# Patient Record
Sex: MALE | Race: WHITE | NOT HISPANIC OR LATINO | Employment: OTHER | URBAN - METROPOLITAN AREA
[De-identification: names, ages, dates, MRNs, and addresses within clinical notes are randomized per-mention and may not be internally consistent; named-entity substitution may affect disease eponyms.]

---

## 2017-01-06 ENCOUNTER — HOSPITAL ENCOUNTER (OUTPATIENT)
Dept: LAB | Facility: MEDICAL CENTER | Age: 62
End: 2017-01-06
Attending: FAMILY MEDICINE
Payer: COMMERCIAL

## 2017-01-06 ENCOUNTER — HOSPITAL ENCOUNTER (OUTPATIENT)
Dept: LAB | Facility: MEDICAL CENTER | Age: 62
End: 2017-01-06
Attending: INTERNAL MEDICINE
Payer: COMMERCIAL

## 2017-01-06 DIAGNOSIS — E78.5 HYPERLIPIDEMIA, UNSPECIFIED HYPERLIPIDEMIA TYPE: ICD-10-CM

## 2017-01-06 DIAGNOSIS — E03.9 HYPOTHYROIDISM, UNSPECIFIED TYPE: ICD-10-CM

## 2017-01-06 LAB
CHOLEST SERPL-MCNC: 162 MG/DL (ref 100–199)
EST. AVERAGE GLUCOSE BLD GHB EST-MCNC: 183 MG/DL
HBA1C MFR BLD: 8 % (ref 0–5.6)
HDLC SERPL-MCNC: 41 MG/DL
LDLC SERPL CALC-MCNC: 85 MG/DL
T4 FREE SERPL-MCNC: 1.01 NG/DL (ref 0.53–1.43)
TRIGL SERPL-MCNC: 180 MG/DL (ref 0–149)
TSH SERPL DL<=0.005 MIU/L-ACNC: 2.18 UIU/ML (ref 0.3–3.7)

## 2017-01-06 PROCEDURE — 84443 ASSAY THYROID STIM HORMONE: CPT

## 2017-01-06 PROCEDURE — 84439 ASSAY OF FREE THYROXINE: CPT

## 2017-01-06 PROCEDURE — 83036 HEMOGLOBIN GLYCOSYLATED A1C: CPT

## 2017-01-06 PROCEDURE — 80061 LIPID PANEL: CPT

## 2017-01-06 PROCEDURE — 36415 COLL VENOUS BLD VENIPUNCTURE: CPT

## 2017-01-24 ENCOUNTER — APPOINTMENT (OUTPATIENT)
Dept: MEDICAL GROUP | Facility: MEDICAL CENTER | Age: 62
End: 2017-01-24
Payer: COMMERCIAL

## 2017-02-16 DIAGNOSIS — Z87.19 HX OF DIVERTICULITIS OF COLON: ICD-10-CM

## 2017-02-16 DIAGNOSIS — E78.5 HYPERLIPIDEMIA, UNSPECIFIED HYPERLIPIDEMIA TYPE: ICD-10-CM

## 2017-02-16 RX ORDER — LEVOTHYROXINE SODIUM 200 MCG
200 TABLET ORAL DAILY
Qty: 90 TAB | Refills: 3 | Status: SHIPPED | OUTPATIENT
Start: 2017-02-16 | End: 2018-04-06 | Stop reason: SDUPTHER

## 2017-02-16 RX ORDER — LISINOPRIL 10 MG/1
10 TABLET ORAL
Qty: 90 TAB | Refills: 3 | Status: SHIPPED | OUTPATIENT
Start: 2017-02-16 | End: 2018-04-06 | Stop reason: SDUPTHER

## 2017-02-16 RX ORDER — DAPAGLIFLOZIN 5 MG/1
5 TABLET, FILM COATED ORAL DAILY
Qty: 90 TAB | Refills: 3 | Status: SHIPPED | OUTPATIENT
Start: 2017-02-16 | End: 2018-04-06 | Stop reason: SDUPTHER

## 2017-02-16 RX ORDER — SIMVASTATIN 40 MG
40 TABLET ORAL EVERY EVENING
Qty: 90 TAB | Refills: 3 | Status: SHIPPED | OUTPATIENT
Start: 2017-02-16 | End: 2018-04-06 | Stop reason: SDUPTHER

## 2017-02-16 NOTE — TELEPHONE ENCOUNTER
----- Message from Your Healthcare Team sent at 2/16/2017 11:49 AM PST -----  Regarding: RE: Prescription Question  Contact: [phone]  I will need rx's for Farxiga-10mg, Lisinopril-10mg, Metformin-1000mg, Synthroid-200mcg and Simvastatin-40mg. The Glimepiride-1mg and Victoza have already been done through Dr. Oliva.                                Thank You, Gianfranco Frank  ----- Message -----  From: Venecia Rosen, Med Ass't  Sent: 2/15/2017  2:38 PM PST  To: Kristian Frank  Subject: RE: Prescription Question    Stephen Townsend  Would be happy to send prescriptions to [facility].  What are you in need of at this time?    ----- Message -----     From: KRISTIAN FRANK     Sent: 2/15/2017 10:45 AM PST       To: Lisbet Kline M.D.  Subject: Prescription Question    I need to have new prescriptions issued for my new 90 day mail order pharmacy at [facility]. The city \[facility] [place] [facility]\"" changed over from [facility] on the 1st of the year. The Rx Bin: is [id number] and the Rx Group: is [facility], my member # is [id number]. The Victoza was changed to [facility] [address] [facility] in January through Dr. Oliva's office as well as a new rx for Glimepiride, 1mg that was filled by mail order through [facility]. Hope you're recovering well.

## 2017-05-05 ENCOUNTER — HOSPITAL ENCOUNTER (OUTPATIENT)
Dept: LAB | Facility: MEDICAL CENTER | Age: 62
End: 2017-05-05
Attending: INTERNAL MEDICINE
Payer: COMMERCIAL

## 2017-05-05 LAB
EST. AVERAGE GLUCOSE BLD GHB EST-MCNC: 148 MG/DL
HBA1C MFR BLD: 6.8 % (ref 0–5.6)

## 2017-05-05 PROCEDURE — 36415 COLL VENOUS BLD VENIPUNCTURE: CPT

## 2017-05-05 PROCEDURE — 83036 HEMOGLOBIN GLYCOSYLATED A1C: CPT

## 2017-11-02 ENCOUNTER — HOSPITAL ENCOUNTER (OUTPATIENT)
Dept: LAB | Facility: MEDICAL CENTER | Age: 62
End: 2017-11-02
Attending: INTERNAL MEDICINE
Payer: COMMERCIAL

## 2017-11-02 LAB
ALBUMIN SERPL BCP-MCNC: 4.3 G/DL (ref 3.2–4.9)
ALBUMIN/GLOB SERPL: 1.6 G/DL
ALP SERPL-CCNC: 46 U/L (ref 30–99)
ALT SERPL-CCNC: 31 U/L (ref 2–50)
ANION GAP SERPL CALC-SCNC: 12 MMOL/L (ref 0–11.9)
AST SERPL-CCNC: 21 U/L (ref 12–45)
BILIRUB SERPL-MCNC: 0.5 MG/DL (ref 0.1–1.5)
BUN SERPL-MCNC: 16 MG/DL (ref 8–22)
CALCIUM SERPL-MCNC: 9.5 MG/DL (ref 8.5–10.5)
CHLORIDE SERPL-SCNC: 103 MMOL/L (ref 96–112)
CHOLEST SERPL-MCNC: 138 MG/DL (ref 100–199)
CO2 SERPL-SCNC: 23 MMOL/L (ref 20–33)
CREAT SERPL-MCNC: 0.93 MG/DL (ref 0.5–1.4)
CREAT UR-MCNC: 117.4 MG/DL
EST. AVERAGE GLUCOSE BLD GHB EST-MCNC: 148 MG/DL
GFR SERPL CREATININE-BSD FRML MDRD: >60 ML/MIN/1.73 M 2
GLOBULIN SER CALC-MCNC: 2.7 G/DL (ref 1.9–3.5)
GLUCOSE SERPL-MCNC: 128 MG/DL (ref 65–99)
HBA1C MFR BLD: 6.8 % (ref 0–5.6)
HDLC SERPL-MCNC: 43 MG/DL
LDLC SERPL CALC-MCNC: 38 MG/DL
MICROALBUMIN UR-MCNC: <0.7 MG/DL
MICROALBUMIN/CREAT UR: NORMAL MG/G (ref 0–30)
POTASSIUM SERPL-SCNC: 4.4 MMOL/L (ref 3.6–5.5)
PROT SERPL-MCNC: 7 G/DL (ref 6–8.2)
SODIUM SERPL-SCNC: 138 MMOL/L (ref 135–145)
T4 FREE SERPL-MCNC: 0.82 NG/DL (ref 0.53–1.43)
TRIGL SERPL-MCNC: 287 MG/DL (ref 0–149)
TSH SERPL DL<=0.005 MIU/L-ACNC: 4.35 UIU/ML (ref 0.3–3.7)

## 2017-11-02 PROCEDURE — 80061 LIPID PANEL: CPT

## 2017-11-02 PROCEDURE — 82570 ASSAY OF URINE CREATININE: CPT

## 2017-11-02 PROCEDURE — 82043 UR ALBUMIN QUANTITATIVE: CPT

## 2017-11-02 PROCEDURE — 83036 HEMOGLOBIN GLYCOSYLATED A1C: CPT

## 2017-11-02 PROCEDURE — 84443 ASSAY THYROID STIM HORMONE: CPT

## 2017-11-02 PROCEDURE — 84439 ASSAY OF FREE THYROXINE: CPT

## 2017-11-02 PROCEDURE — 36415 COLL VENOUS BLD VENIPUNCTURE: CPT

## 2017-11-02 PROCEDURE — 80053 COMPREHEN METABOLIC PANEL: CPT

## 2017-11-04 ENCOUNTER — OFFICE VISIT (OUTPATIENT)
Dept: URGENT CARE | Facility: PHYSICIAN GROUP | Age: 62
End: 2017-11-04
Payer: COMMERCIAL

## 2017-11-04 VITALS
WEIGHT: 194 LBS | DIASTOLIC BLOOD PRESSURE: 82 MMHG | SYSTOLIC BLOOD PRESSURE: 110 MMHG | RESPIRATION RATE: 16 BRPM | HEIGHT: 66 IN | BODY MASS INDEX: 31.18 KG/M2 | TEMPERATURE: 98.4 F | OXYGEN SATURATION: 94 % | HEART RATE: 82 BPM

## 2017-11-04 DIAGNOSIS — H93.8X3 SENSATION OF FULLNESS IN EAR, BILATERAL: ICD-10-CM

## 2017-11-04 DIAGNOSIS — H61.21 IMPACTED CERUMEN OF RIGHT EAR: ICD-10-CM

## 2017-11-04 PROCEDURE — 99213 OFFICE O/P EST LOW 20 MIN: CPT | Performed by: PHYSICIAN ASSISTANT

## 2017-11-04 NOTE — PROGRESS NOTES
Chief Complaint   Patient presents with   • Ear Fullness     BL ear fullness x2 weeks       HISTORY OF PRESENT ILLNESS: Patient is a 62 y.o. male who presents today for 2 weeks of waxing and waning severity however overall worsening of bilateral ear fullness. Patient states he has had issues with wax build up in the ears in the past and suspects this may be going on. He was in hot tub last week and did got water in his ear and felt that may have aggravated the situation.  Felt warm this morning, no confirmed fevers but has been feeling well otherwise.  Does deal with seasonal allergies and takes medication for this, does not feel it is severe as it is in the spring.  No headaches.  No vertigo.  Decreased hearing on right.  No tinnitus.     Patient Active Problem List    Diagnosis Date Noted   • Allergic rhinitis 04/18/2016   • Post-nasal drip 04/18/2016   • Low back pain 04/18/2016   • Abnormal EKG 02/11/2016   • Tobacco abuse disorder 02/11/2016   • Obesity (BMI 30.0-34.9) 02/11/2016   • Epigastric abdominal pain 02/11/2016   • Sleep apnea 02/11/2016   • Tenosynovitis of wrist 02/11/2016   • Type 2 diabetes mellitus with hemoglobin A1c goal of less than 7.0% (CMS-[facility]) 10/10/2013   • Vitamin D deficiency disease 12/04/2012   • Hyperlipidemia    • Hypothyroid    • Diverticulitis        Allergies:Review of patient's allergies indicates no known allergies.    Current Outpatient Prescriptions Ordered in [facility]   Medication Sig Dispense Refill   • lisinopril (PRINIVIL) 10 MG Tab Take 1 Tab by mouth every day. 90 Tab 3   • metformin (GLUCOPHAGE) 1000 MG tablet Take 1 Tab by mouth 2 times a day, with meals. 180 Tab 3   • SYNTHROID 200 MCG Tab Take 1 Tab by mouth every day. 90 Tab 3   • simvastatin (ZOCOR) 40 MG Tab Take 1 Tab by mouth every evening. 90 Tab 3   • Dapagliflozin Propanediol 5 MG Tab Take 5 mg by mouth every day. 90 Tab 3   • VICTOZA 18 MG/3ML Solution Pen-injector injection INJECT 0.3 ML AS INSTRUCTED EVERY  "DAY. 3 mL 3   • Blood Glucose Monitoring Suppl SUPPLIES Misc Test strips for One Touch Ultra Blue meter. Sig: use BID and prn ssx high or low sugar #100 RF x 3 100 Strip 11   • B-D ULTRAFINE III SHORT PEN 31G X 8 MM Misc USE DAILY 100 Each 2     No current Epic-ordered facility-administered medications on file.        Past Medical History:   Diagnosis Date   • Back pain    • DIABETES MELLITUS    • Diverticulitis    • GOUT    • Hyperlipidemia    • Hypothyroid    • Pneumonia    • Post-nasal drip    • Rhinitis        Social History   Substance Use Topics   • Smoking status: Current Every Day Smoker     Packs/day: 0.50   • Smokeless tobacco: Never Used      Comment: started 1971  quit 1993   • Alcohol use 14.4 oz/week     24 Cans of beer per week      Comment: social       Family Status   Relation Status   • Mother Alive   • Father Alive   • Sister Alive   • Brother Alive   • Son Alive     Family History   Problem Relation Age of Onset   • Diabetes Mother    • Hypertension Mother    • Hyperlipidemia Mother    • Heart Disease Father    • Hypertension Father    • Hyperlipidemia Father        ROS:  Review of Systems   Constitutional: Negative for fever, chills, weight loss and malaise/fatigue.   HENT: SEE HPI  Eyes: Negative for blurred vision.   Respiratory: Negative for cough, sputum production, shortness of breath and wheezing.    Cardiovascular: Negative for chest pain, palpitations, orthopnea and leg swelling.   Gastrointestinal: Negative for heartburn, nausea, vomiting and abdominal pain.   All other systems reviewed and are negative.       Exam:  Blood pressure 110/82, pulse 82, temperature 36.9 °C (98.4 °F), resp. rate 16, height 1.676 m (5' 6\"), weight 88 kg (194 lb), SpO2 94 %.  General:  Well nourished, well developed male in NAD  Eyes: PERRLA, EOM within normal limits, no conjunctival injection, no scleral icterus, visual fields and acuity grossly intact.  Ears: Normal shape and symmetry, no tenderness, no " discharge. Right cerumen impaction.  S/p lavage: External canals are without any significant edema or erythema. Tympanic membranes are without any inflammation, no effusion. Gross auditory acuity is intact. No mastoid tenderness, no periauricular lymphadenopathy or tenderness  Nose: Symmetrical, sinuses without tenderness, clear rhinorrhea.   Mouth: reasonable hygiene, no erythema exudates or tonsillar enlargement.  Neck: no masses, range of motion within normal limits, no tracheal deviation. No lymphadenopathy  Pulmonary: Normal respiratory effort, no wheezes, crackles, or rhonchi.  Cardiovascular: regular rate and rhythm without murmurs, rubs, or gallops.  Skin: No visible rashes or lesion. Warm, pink, dry.   Extremities: no clubbing, cyanosis, or edema.  Neuro: A&O x 3. Speech normal/clear.  Normal gait.         Assessment/Plan:  1. Impacted cerumen of right ear     2. Sensation of fullness in ear, bilateral         -successful lavage with resolution of right ear fullness per patient  -otherwise benign ear exams, no evidence of infection  -suspect there may be some ETD in addition given bilateral waxing and waning fullness  -allergy care continued recommended  -PCP follow up and RTC precautions discussed     Supportive care, differential diagnoses, and indications for immediate follow-up discussed with patient.   Pathogenesis of diagnosis discussed including typical length and natural progression.   Instructed to return to clinic or nearest emergency department for any change in condition, further concerns, or worsening of symptoms.  Patient states understanding of the plan of care and discharge instructions.  Instructed to make an appointment, for follow up, with their primary care provider.      Crystal Liu P.A.-C.

## 2017-11-21 ENCOUNTER — OFFICE VISIT (OUTPATIENT)
Dept: MEDICAL GROUP | Facility: LAB | Age: 62
End: 2017-11-21
Payer: COMMERCIAL

## 2017-11-21 VITALS
RESPIRATION RATE: 16 BRPM | BODY MASS INDEX: 31.18 KG/M2 | WEIGHT: 194 LBS | SYSTOLIC BLOOD PRESSURE: 108 MMHG | DIASTOLIC BLOOD PRESSURE: 78 MMHG | HEIGHT: 66 IN | HEART RATE: 84 BPM | OXYGEN SATURATION: 95 % | TEMPERATURE: 97.4 F

## 2017-11-21 DIAGNOSIS — E03.9 HYPOTHYROIDISM, UNSPECIFIED TYPE: ICD-10-CM

## 2017-11-21 DIAGNOSIS — E11.9 TYPE 2 DIABETES MELLITUS WITH HEMOGLOBIN A1C GOAL OF LESS THAN 7.0% (HCC): ICD-10-CM

## 2017-11-21 DIAGNOSIS — E55.9 VITAMIN D DEFICIENCY DISEASE: ICD-10-CM

## 2017-11-21 DIAGNOSIS — Z72.0 TOBACCO ABUSE DISORDER: ICD-10-CM

## 2017-11-21 DIAGNOSIS — E66.9 OBESITY (BMI 30.0-34.9): ICD-10-CM

## 2017-11-21 DIAGNOSIS — E78.5 HYPERLIPIDEMIA, UNSPECIFIED HYPERLIPIDEMIA TYPE: ICD-10-CM

## 2017-11-21 PROCEDURE — 99214 OFFICE O/P EST MOD 30 MIN: CPT | Performed by: FAMILY MEDICINE

## 2017-11-21 ASSESSMENT — PATIENT HEALTH QUESTIONNAIRE - PHQ9: CLINICAL INTERPRETATION OF PHQ2 SCORE: 0

## 2017-11-21 NOTE — LETTER
[facility]  Lisbet Kline M.D.  [address]  [place] [address]  Fax: [phone]   Authorization for Release/Disclosure of   Protected Health Information   Name: SURESH ART : 1955 SSN: [id number]   Address: [address] [place] [address] [address] [place] [address] Phone:    [phone] (home)    I authorize the entity listed below to release/disclose the PHI below to:   [facility]/Lisbet Kline M.D. and Lisbet Kline M.D.   Provider or Entity Name:     Address   City, State, Zip   Phone:      Fax:     Reason for request: continuity of care   Information to be released:    [  ] LAST COLONOSCOPY,  including any PATH REPORT and follow-up  [  ] LAST FIT/COLOGUARD RESULT [  ] LAST DEXA  [  ] LAST MAMMOGRAM  [  ] LAST PAP  [  ] LAST LABS [  ] RETINA EXAM REPORT  [  ] IMMUNIZATION RECORDS  [  ] Release all info      [  ] Check here and initial the line next to each item to release ALL health information INCLUDING  _____ Care and treatment for drug and / or alcohol abuse  _____ HIV testing, infection status, or AIDS  _____ Genetic Testing    DATES OF SERVICE OR TIME PERIOD TO BE DISCLOSED: _____________  I understand and acknowledge that:  * This Authorization may be revoked at any time by you in writing, except if your health information has already been used or disclosed.  * Your health information that will be used or disclosed as a result of you signing this authorization could be re-disclosed by the recipient. If this occurs, your re-disclosed health information may no longer be protected by State or Federal laws.  * You may refuse to sign this Authorization. Your refusal will not affect your ability to obtain treatment.  * This Authorization becomes effective upon signing and will  on (date) __________.      If no date is indicated, this Authorization will  one (1) year from the signature date.    Name: Suresh Art    Signature:   Date:     2017       PLEASE FAX  REQUESTED RECORDS BACK TO: [phone]

## 2017-11-21 NOTE — PROGRESS NOTES
Chief Complaint   Patient presents with   • Follow-Up       HISTORY OF PRESENT ILLNESS: Patient is a 62 y.o. male established patient who presents today to follow up     Hyperlipidemia  This is a chronic problem. Patient is taking simvastatin 40 mg one by mouth daily. Recent lab show a total cholesterol of 138, triglyceride of 287, HDL 43 and LDL of 38.    Hypothyroid  This is a chronic problem. Patient is on Synthroid 200 µg daily. Most recent TSH 4.350 with a free T4 of 0.82. He was told to take 1/2 tab (100 mcg) 2 x a week and then a full tab all the rest of the days. He was having trouble splitting the pill so he just skipped the medication all together. Now he is just skipping medication only one day a week so he is now taking 200 mcg 6 days a week     Type 2 diabetes mellitus with hemoglobin A1c goal of less than 7.0% (CMS-[facility])  This is a chronic problem. He is followed by Dr. Oliva . Most recent A1c 6.8 which is stable from his A1c back in May. Urine microalbumin creatinine ratio is normal. He is on ACE inhibitor and he is on a statin. Doing okay with diabetic diet. Could propbably do with less carbs he reports. No hypoglycemia. No CP. No CP with exertion. Sees specialist 2 x a year     Vitamin D deficiency disease  This is a chronic problem. Taking vitamin d supplement     Tobacco abuse disorder  This is a chronic problem. Still at 1/2 ppd. Not ready to quit yet. Tried chantix in the past and he did ok but did not stay on the medication     States he has been doing pretty well overall. He had some trouble with the right knee over the summer. He slipped when camping.  His knee hurt for 3 months and now better for the past 3 weeks     Had flu vaccine this season     Patient Active Problem List    Diagnosis Date Noted   • Allergic rhinitis 04/18/2016   • Post-nasal drip 04/18/2016   • Low back pain 04/18/2016   • Abnormal EKG 02/11/2016   • Tobacco abuse disorder 02/11/2016   • Obesity (BMI 30.0-34.9)  "02/11/2016   • Epigastric abdominal pain 02/11/2016   • Sleep apnea 02/11/2016   • Tenosynovitis of wrist 02/11/2016   • Type 2 diabetes mellitus with hemoglobin A1c goal of less than 7.0% (CMS-[facility]) 10/10/2013   • Vitamin D deficiency disease 12/04/2012   • Hyperlipidemia    • Hypothyroid    • Diverticulitis         Allergies:Patient has no known allergies.    Current Outpatient Prescriptions   Medication Sig Dispense Refill   • lisinopril (PRINIVIL) 10 MG Tab Take 1 Tab by mouth every day. 90 Tab 3   • metformin (GLUCOPHAGE) 1000 MG tablet Take 1 Tab by mouth 2 times a day, with meals. 180 Tab 3   • SYNTHROID 200 MCG Tab Take 1 Tab by mouth every day. 90 Tab 3   • simvastatin (ZOCOR) 40 MG Tab Take 1 Tab by mouth every evening. 90 Tab 3   • Dapagliflozin Propanediol 5 MG Tab Take 5 mg by mouth every day. 90 Tab 3   • VICTOZA 18 MG/3ML Solution Pen-injector injection INJECT 0.3 ML AS INSTRUCTED EVERY DAY. 3 mL 3   • Blood Glucose Monitoring Suppl SUPPLIES Misc Test strips for One Touch Ultra Blue meter. Sig: use BID and prn ssx high or low sugar #100 RF x 3 100 Strip 11   • B-D ULTRAFINE III SHORT PEN 31G X 8 MM Misc USE DAILY 100 Each 2     No current facility-administered medications for this visit.        Social History   Substance Use Topics   • Smoking status: Current Every Day Smoker     Packs/day: 0.50   • Smokeless tobacco: Never Used      Comment: started 1971  quit 1993   • Alcohol use 14.4 oz/week     24 Cans of beer per week      Comment: social       Social History     Social History Narrative   • No narrative on file       Family History   Problem Relation Age of Onset   • Diabetes Mother    • Hypertension Mother    • Hyperlipidemia Mother    • Heart Disease Father    • Hypertension Father    • Hyperlipidemia Father        ROS:        Exam:    /78   Pulse 84   Temp 36.3 °C (97.4 °F)   Resp 16   Ht 1.676 m (5' 6\")   Wt 88 kg (194 lb)   SpO2 95%   BMI 31.31 kg/m²       General:  Well " nourished, well developed male in NAD    Physical Exam   Constitutional: Appears well-developed and well-nourished. Is active and cooperative.  Non-toxic appearance.   Head: Normocephalic and atraumatic.   Right Ear: External ear normal. Left Ear: External ear normal.   Eyes: Conjunctivae, EOM and lids are normal. No scleral icterus.   Cardiovascular: Normal rate, regular rhythm and normal heart sounds.    Pulmonary/Chest: Effort normal and breath sounds normal.   Neurological: Alert. No tremor. No display of seizure activity. Coordination and gait normal.   Skin: Skin is warm and dry. Patient is not diaphoretic.   Psychiatric: patient has a normal mood and affect; speech is normal and behavior is normal.  Monofilament normal bilaterally but sligtly less sensation on the right       Hospital Outpatient Visit on 11/02/2017   Component Date Value Ref Range Status   • Creatinine, Urine 11/02/2017 117.40  mg/dL Final   • Microalbumin, Urine Random 11/02/2017 <0.7  mg/dL Final   • Micro Alb Creat Ratio 11/02/2017 see below  0 - 30 mg/g Final    Comment: Unable to calculate the microalbumin/creatinine ratio due to  the microalbumin result or the urine creatinine result being  outside the measurement range of the analyzer.     • Free T-4 11/02/2017 0.82  0.53 - 1.43 ng/dL Final   • TSH 11/02/2017 4.350* 0.300 - 3.700 uIU/mL Final   • Sodium 11/02/2017 138  135 - 145 mmol/L Final   • Potassium 11/02/2017 4.4  3.6 - 5.5 mmol/L Final   • Chloride 11/02/2017 103  96 - 112 mmol/L Final   • Co2 11/02/2017 23  20 - 33 mmol/L Final   • Anion Gap 11/02/2017 12.0* 0.0 - 11.9 Final   • Calcium 11/02/2017 9.5  8.5 - 10.5 mg/dL Final   • Glucose 11/02/2017 128* 65 - 99 mg/dL Final   • Bun 11/02/2017 16  8 - 22 mg/dL Final   • Creatinine 11/02/2017 0.93  0.50 - 1.40 mg/dL Final   • AST(SGOT) 11/02/2017 21  12 - 45 U/L Final   • ALT(SGPT) 11/02/2017 31  2 - 50 U/L Final   • Alkaline Phosphatase 11/02/2017 46  30 - 99 U/L Final   • Total  Bilirubin 11/02/2017 0.5  0.1 - 1.5 mg/dL Final   • Albumin 11/02/2017 4.3  3.2 - 4.9 g/dL Final   • Total Protein 11/02/2017 7.0  6.0 - 8.2 g/dL Final   • Globulin 11/02/2017 2.7  1.9 - 3.5 g/dL Final   • A-G Ratio 11/02/2017 1.6  g/dL Final   • Cholesterol,Tot 11/02/2017 138  100 - 199 mg/dL Final   • Triglycerides 11/02/2017 287* 0 - 149 mg/dL Final   • HDL 11/02/2017 43  >=40 mg/dL Final   • LDL 11/02/2017 38  <100 mg/dL Final   • Glycohemoglobin 11/02/2017 6.8* 0.0 - 5.6 % Final    Comment: Increased risk for diabetes:  5.7 -6.4%  Diabetes:  >6.4%  Glycemic control for adults with diabetes:  <7.0%  The above interpretations are per ADA guidelines.  Diagnosis  of diabetes mellitus on the basis of elevated Hemoglobin A1c  should be confirmed by repeating the Hb A1c test.     • Est Avg Glucose 11/02/2017 148  mg/dL Final    Comment: The eAG calculation is based on the A1c-Derived Daily Glucose  (ADAG) study.  See the ADA's website for additional information.     • GFR If  11/02/2017 >60  >60 mL/min/1.73 m 2 Final   • GFR If Non  11/02/2017 >60  >60 mL/min/1.73 m 2 Final         Assessment/Plan:    1. Hypothyroidism, unspecified type  Discussed lab results with patient. States that he had not been taking his medication correctly. He is not taking Synthroid 200 µg 6 days a week. He is to check his labs in 2 months to make sure that he is on the correct dosage of the medication  - TSH; Future  - FREE THYROXINE; Future    2. Hyperlipidemia, unspecified hyperlipidemia type  LDL is at goal. His triglycerides are elevated. Recommend patient cut back on his carbohydrate intake.    3. Type 2 diabetes mellitus with hemoglobin A1c goal of less than 7.0% (CMS-[facility])  Followed by specialist. His A1c is at goal. Monofilament exam done today.  - Diabetic Monofilament Lower Extremity Exam    4. Vitamin D deficiency disease  Patient is to continue with vitamin D supplementation. Will check his vitamin  D level in a couple of months    5. Tobacco abuse disorder  Discussed cessation. Patient states that he's been on Chantix in the past and did okay with that however he quit taking the medication. Reminded patient that when he is ready to try to stop quitting he can try the Chantix again.    6. Obesity (BMI 30.0-34.9)  Discussed dietary changes and cutting back on carbohydrates.  - Patient identified as having weight management issue.  Appropriate orders and counseling given.      Please note that this dictation was created using voice recognition software. I have made every reasonable attempt to correct obvious errors, but I expect that there are errors of grammar and possibly content that I did not discover before finalizing the note.

## 2017-11-22 NOTE — TELEPHONE ENCOUNTER
Was the patient seen in the last year in this department? Yes Lov 11/22/2017    Does patient have an active prescription for medications requested? No     Received Request Via: Patient

## 2017-11-22 NOTE — TELEPHONE ENCOUNTER
----- Message from Kristian Frank sent at 11/22/2017 11:52 AM PST -----  Regarding: Prescription Question  Contact: [phone]  Good Morning, I need an rx refill for One Touch Ultra test strips. I forgot to ask at my appointment yesterday. I typically have my prescriptions filled through [facility] with the exception of Victoza which is obtained through the [facility] pharmacy on [address]. My old rx for test strips was through [facility] on [address]. I would like to obtain them through [facility] from now on if possible. I received a form from your office on my chart that looks like one that I signed yesterdday during my visit, there was no explanation with the form of what you'd like me to do with it.  Thanks, Gianfranco Frank

## 2018-01-29 ENCOUNTER — OFFICE VISIT (OUTPATIENT)
Dept: MEDICAL GROUP | Facility: LAB | Age: 63
End: 2018-01-29
Payer: COMMERCIAL

## 2018-01-29 ENCOUNTER — HOSPITAL ENCOUNTER (OUTPATIENT)
Dept: RADIOLOGY | Facility: MEDICAL CENTER | Age: 63
End: 2018-01-29
Attending: FAMILY MEDICINE
Payer: COMMERCIAL

## 2018-01-29 ENCOUNTER — APPOINTMENT (OUTPATIENT)
Dept: MEDICAL GROUP | Facility: LAB | Age: 63
End: 2018-01-29
Payer: COMMERCIAL

## 2018-01-29 VITALS
SYSTOLIC BLOOD PRESSURE: 112 MMHG | TEMPERATURE: 97.3 F | DIASTOLIC BLOOD PRESSURE: 80 MMHG | HEART RATE: 94 BPM | RESPIRATION RATE: 16 BRPM | OXYGEN SATURATION: 97 % | WEIGHT: 197 LBS | HEIGHT: 66 IN | BODY MASS INDEX: 31.66 KG/M2

## 2018-01-29 DIAGNOSIS — E11.9 TYPE 2 DIABETES MELLITUS WITH HEMOGLOBIN A1C GOAL OF LESS THAN 7.0% (HCC): ICD-10-CM

## 2018-01-29 DIAGNOSIS — M54.12 LEFT CERVICAL RADICULOPATHY: ICD-10-CM

## 2018-01-29 DIAGNOSIS — R29.898 LEFT HAND WEAKNESS: ICD-10-CM

## 2018-01-29 LAB
HBA1C MFR BLD: 6.5 % (ref ?–5.8)
INT CON NEG: NEGATIVE
INT CON POS: POSITIVE

## 2018-01-29 PROCEDURE — 83036 HEMOGLOBIN GLYCOSYLATED A1C: CPT | Performed by: FAMILY MEDICINE

## 2018-01-29 PROCEDURE — 99214 OFFICE O/P EST MOD 30 MIN: CPT | Performed by: FAMILY MEDICINE

## 2018-01-29 PROCEDURE — 72040 X-RAY EXAM NECK SPINE 2-3 VW: CPT

## 2018-01-29 NOTE — PROGRESS NOTES
Chief Complaint   Patient presents with   • Diabetes     follow up       HISTORY OF PRESENT ILLNESS: Patient is a 62 y.o. male established patient who presents today to follow up on his blood sugars     Type 2 diabetes mellitus with hemoglobin A1c goal of less than 7.0% (CMS-[facility])  This is a chronic problem. He is followed by Dr. Oliva. States he has left a message and has not heard back from his office. Patient is taking metformin 1000 mg twice daily, Victoza (GLP 1 agonist), farxiga (SGLT2 inhibitor).  Recently his BS have been going up over the past 6 weeks. He is taking medication as directed. This AM his FBS was better at 111. He states he has been feeling okay. FBS up from 130-140 to 155-175 over the past 6 weeks. A1c was 6.8 back on 11/2/2017    Reports there is 'something unusual going on' with his left hand. The '4th and 5th fingers are not engaging well'. No numbness, tingling in the fingers. The 5th finger will not move right sometimes. Started after he had headaches. This has been ongoing x 2 weeks. Is a little worse he reports. No trouble walking, taking no trouble with speech. Some pain in the left arm. No CP, no SOB, no cough. He is not dropping things. No facial droop. Is more constant now. No headache currently . Worse when he first wakes up.  Right handed. No neck pain     Patient Active Problem List    Diagnosis Date Noted   • Allergic rhinitis 04/18/2016   • Post-nasal drip 04/18/2016   • Low back pain 04/18/2016   • Abnormal EKG 02/11/2016   • Tobacco abuse disorder 02/11/2016   • Obesity (BMI 30.0-34.9) 02/11/2016   • Epigastric abdominal pain 02/11/2016   • Sleep apnea 02/11/2016   • Tenosynovitis of wrist 02/11/2016   • Type 2 diabetes mellitus with hemoglobin A1c goal of less than 7.0% (CMS-[facility]) 10/10/2013   • Vitamin D deficiency disease 12/04/2012   • Hyperlipidemia    • Hypothyroid    • Diverticulitis         Allergies:Patient has no known allergies.    Current Outpatient Prescriptions  "  Medication Sig Dispense Refill   • Blood Glucose Monitoring Suppl Supplies Misc Test strips for One Touch Ultra Blue meter. Sig: use BID and prn ssx high or low sugar #100 RF x 3 100 Strip 11   • lisinopril (PRINIVIL) 10 MG Tab Take 1 Tab by mouth every day. 90 Tab 3   • metformin (GLUCOPHAGE) 1000 MG tablet Take 1 Tab by mouth 2 times a day, with meals. 180 Tab 3   • SYNTHROID 200 MCG Tab Take 1 Tab by mouth every day. 90 Tab 3   • simvastatin (ZOCOR) 40 MG Tab Take 1 Tab by mouth every evening. 90 Tab 3   • Dapagliflozin Propanediol 5 MG Tab Take 5 mg by mouth every day. 90 Tab 3   • VICTOZA 18 MG/3ML Solution Pen-injector injection INJECT 0.3 ML AS INSTRUCTED EVERY DAY. 3 mL 3   • B-D ULTRAFINE III SHORT PEN 31G X 8 MM Misc USE DAILY 100 Each 2     No current facility-administered medications for this visit.        Social History   Substance Use Topics   • Smoking status: Current Every Day Smoker     Packs/day: 0.50   • Smokeless tobacco: Never Used      Comment: started 1971  quit 1993   • Alcohol use 14.4 oz/week     24 Cans of beer per week      Comment: social       Social History     Social History Narrative   • No narrative on file       Family History   Problem Relation Age of Onset   • Diabetes Mother    • Hypertension Mother    • Hyperlipidemia Mother    • Heart Disease Father    • Hypertension Father    • Hyperlipidemia Father        ROS:          Exam:    Blood pressure 112/80, pulse 94, temperature 36.3 °C (97.3 °F), resp. rate 16, height 1.676 m (5' 6\"), weight 89.4 kg (197 lb), SpO2 97 %.    General:  Well nourished, well developed male in NAD    Physical Exam   Constitutional: Appears well-developed and well-nourished. Is active and cooperative.  Non-toxic appearance.   Head: Normocephalic and atraumatic.   Right Ear: External ear normal. Left Ear: External ear normal.   Eyes: Conjunctivae, EOM and lids are normal. No scleral icterus.   Neurological: Alert, no acute distress.  No tremor. No " display of seizure activity. Coordination and gait normal.   Skin: Skin is warm and dry. Patient is not diaphoretic.   Psychiatric: patient has a normal mood and affect; speech is normal and behavior is normal.  Patient does have some weakness in his left fourth and fifth finger to extension against resistance. Patient has no tenderness at the lateral, medial epicondyle with active extension or flexion of wrist respectively    A1c 6.5       Assessment/Plan:    1. Type 2 diabetes mellitus with hemoglobin A1c goal of less than 7.0% (CMS-[facility])  Controlled. Patient states that his blood sugar this morning was lower. He is to continue to take his medications as directed and also check his blood sugars every morning fasting. No medication changes were made today.   - POCT  A1C    2. Left cervical radiculopathy  Check x-ray of the cervical spine.  - DX-CERVICAL SPINE-2 OR 3 VIEWS; Future    3. Left hand weakness  Check x-rays of the cervical spine.    Didn't check patient to go to the emergency room if he has any recurrence of the headache with weakness in any part of the body.    Please note that this dictation was created using voice recognition software. I have made every reasonable attempt to correct obvious errors, but I expect that there are errors of grammar and possibly content that I did not discover before finalizing the note.

## 2018-01-29 NOTE — ASSESSMENT & PLAN NOTE
Followed by Dr. Oliva. Taking metformin 1000 mg twice daily, Victoza (GLP 1 agonist), farxiga (SGLT2 inhibitor).    This is chronic

## 2018-02-01 ENCOUNTER — TELEPHONE (OUTPATIENT)
Dept: MEDICAL GROUP | Facility: LAB | Age: 63
End: 2018-02-01

## 2018-02-01 NOTE — TELEPHONE ENCOUNTER
----- Message from Lisbet Kline M.D. sent at 2/1/2018 10:35 AM PST -----  Neck xray shows arthritis and also vascular disease. Please have him follow up in about 6-8 weeks so we can see what his BS are doing as well

## 2018-02-01 NOTE — TELEPHONE ENCOUNTER
Sent my chart to help scheduled appt    [facility]ist Released Result Comments     Viewed by Kristian Frank on 2/1/2018 11:04 AM   Written by Lisbet Kline M.D. on 2/1/2018 10:35 AM   Neck xray shows arthritis and also vascular disease. Please have him follow up in about 6-8 weeks so we can see what his BS are doing as well

## 2018-03-27 ENCOUNTER — OFFICE VISIT (OUTPATIENT)
Dept: MEDICAL GROUP | Facility: LAB | Age: 63
End: 2018-03-27
Payer: COMMERCIAL

## 2018-03-27 VITALS
SYSTOLIC BLOOD PRESSURE: 108 MMHG | HEART RATE: 78 BPM | TEMPERATURE: 97.9 F | RESPIRATION RATE: 16 BRPM | DIASTOLIC BLOOD PRESSURE: 80 MMHG | OXYGEN SATURATION: 95 % | WEIGHT: 197 LBS | BODY MASS INDEX: 31.66 KG/M2 | HEIGHT: 66 IN

## 2018-03-27 DIAGNOSIS — E11.9 TYPE 2 DIABETES MELLITUS WITH HEMOGLOBIN A1C GOAL OF LESS THAN 7.0% (HCC): ICD-10-CM

## 2018-03-27 DIAGNOSIS — I65.29 CAROTID ATHEROSCLEROSIS, UNSPECIFIED LATERALITY: ICD-10-CM

## 2018-03-27 DIAGNOSIS — R29.898 LEFT HAND WEAKNESS: ICD-10-CM

## 2018-03-27 DIAGNOSIS — M54.12 LEFT CERVICAL RADICULOPATHY: ICD-10-CM

## 2018-03-27 DIAGNOSIS — E78.5 HYPERLIPIDEMIA, UNSPECIFIED HYPERLIPIDEMIA TYPE: ICD-10-CM

## 2018-03-27 PROCEDURE — 99214 OFFICE O/P EST MOD 30 MIN: CPT | Performed by: FAMILY MEDICINE

## 2018-03-27 NOTE — PROGRESS NOTES
Chief Complaint   Patient presents with   • Arthritis     follow up Neck Xray   • Orders Needed     PSA DUE       HISTORY OF PRESENT ILLNESS: Patient is a 62 y.o. male established patient who presents today for follow up     Left cervical radiculopathy  This is chronic. Patient recently had xray of the cervical spine and results as below.  He also has issues with lumbar 5-6. States that he has a smashed sciatic nerve. Has seen a neurosurgeon in the past for his lumbar spine.    Left hand weakness  This is chronic. He is still having problems with his left hand, the 4th and 5th fingers, the 5th finger is the worst. Was at first first thing in the morning and now his hand is a little slow compared to the right hand with movement.     Type 2 diabetes mellitus with hemoglobin A1c goal of less than 7.0% (CMS-[facility])  This is chronic. Referral placed to Dr Oliva however patient still hasn't heard back from his office. Patient is checking his fasting blood sugars in a running 135-140. A1c 6.5 in January. Patient is taking his metformin, lisinopril and simvastatin as directed. He is also taking Victoza and dapagliflozin daily     Hyperlipidemia   This is a chronic problem. He is currently taking simvastatin 40 mg daily    X-ray of the cervical spine also some calcification in the carotid arteries.    Patient Active Problem List    Diagnosis Date Noted   • Left cervical radiculopathy 01/29/2018   • Left hand weakness 01/29/2018   • Allergic rhinitis 04/18/2016   • Post-nasal drip 04/18/2016   • Low back pain 04/18/2016   • Abnormal EKG 02/11/2016   • Tobacco abuse disorder 02/11/2016   • Obesity (BMI 30.0-34.9) 02/11/2016   • Epigastric abdominal pain 02/11/2016   • Sleep apnea 02/11/2016   • Tenosynovitis of wrist 02/11/2016   • Type 2 diabetes mellitus with hemoglobin A1c goal of less than 7.0% (CMS-[facility]) 10/10/2013   • Vitamin D deficiency disease 12/04/2012   • Hyperlipidemia    • Hypothyroid    • Diverticulitis      "    Allergies:Patient has no known allergies.    Current Outpatient Prescriptions   Medication Sig Dispense Refill   • Blood Glucose Monitoring Suppl Supplies Misc Test strips for One Touch Ultra Blue meter. Sig: use BID and prn ssx high or low sugar #100 RF x 3 100 Strip 11   • lisinopril (PRINIVIL) 10 MG Tab Take 1 Tab by mouth every day. 90 Tab 3   • metformin (GLUCOPHAGE) 1000 MG tablet Take 1 Tab by mouth 2 times a day, with meals. 180 Tab 3   • SYNTHROID 200 MCG Tab Take 1 Tab by mouth every day. 90 Tab 3   • simvastatin (ZOCOR) 40 MG Tab Take 1 Tab by mouth every evening. 90 Tab 3   • Dapagliflozin Propanediol 5 MG Tab Take 5 mg by mouth every day. 90 Tab 3   • VICTOZA 18 MG/3ML Solution Pen-injector injection INJECT 0.3 ML AS INSTRUCTED EVERY DAY. 3 mL 3   • B-D ULTRAFINE III SHORT PEN 31G X 8 MM Misc USE DAILY 100 Each 2     No current facility-administered medications for this visit.        Social History   Substance Use Topics   • Smoking status: Current Every Day Smoker     Packs/day: 0.50   • Smokeless tobacco: Never Used      Comment: started 1971  quit 1993   • Alcohol use 14.4 oz/week     24 Cans of beer per week      Comment: social       Social History     Social History Narrative   • No narrative on file       Family History   Problem Relation Age of Onset   • Diabetes Mother    • Hypertension Mother    • Hyperlipidemia Mother    • Heart Disease Father    • Hypertension Father    • Hyperlipidemia Father        ROS:       Exam:    Blood pressure 108/80, pulse 78, temperature 36.6 °C (97.9 °F), resp. rate 16, height 1.676 m (5' 6\"), weight 89.4 kg (197 lb), SpO2 95 %.    General:  Well nourished, well developed male in NAD    Physical Exam   Constitutional:  Appears well-developed and well-nourished. Is active and cooperative.  Non-toxic appearance.   Head: Normocephalic and atraumatic.   Right Ear: External ear normal.   Left Ear: External ear normal.    Eyes: Conjunctivae, EOM and lids are normal. " No scleral icterus.   Neurological:  No tremor. No display of seizure activity. Coordination and gait normal.   Skin: Skin is warm and dry. Patient is not diaphoretic.   Psychiatric: patient has a normal mood and affect; speech is normal and behavior is normal.      1/29/2018 12:28 PM    HISTORY/REASON FOR EXAM:  Atraumatic Pain  Neck pain    TECHNIQUE/EXAM DESCRIPTION AND NUMBER OF VIEWS:  Cervical spine series, 3 views.    COMPARISON:  None.      FINDINGS:  The alignment of the cervical spine is normal through C7 on T1.    No displaced fracture is seen.    There is disc space narrowing at C5-6 and C6-7.    There is no prevertebral soft tissue swelling. Calcified plaque identified in the carotid arteries.   Impression       Disc space narrowing at C5-6 and C6-7.    Atherosclerotic disease.         Assessment/Plan:    1. Left cervical radiculopathy  Discussed with patient, uncontrolled. Referral to [facility] [place] for consultation  - REFERRAL TO NEUROSURGERY    2. Left hand weakness  Uncontrolled. Referral to [facility] [place]  - REFERRAL TO NEUROSURGERY    3. Type 2 diabetes mellitus with hemoglobin A1c goal of less than 7.0% (CMS-[facility])  Unclear why his referral to the specialist has not gone through. His A1c is 6.5 in January. He is to return to clinic in 3 months to have his A1c checked.    4. Carotid atherosclerosis, unspecified laterality  Discussed with patient. Continue statin. Check carotid Doppler  - US-CAROTID DOPPLER; Future    5. Hyperlipidemia, unspecified hyperlipidemia type  Controlled on medication. No change in management    Patient is to follow-up in 3 months, sooner when necessary. Will check his A1c at the next visit. Also will go over his referral to [facility] [place] at that time      Please note that this dictation was created using voice recognition software. I have made every reasonable attempt to correct obvious errors, but I expect that there are errors of grammar and possibly content that I did not  discover before finalizing the note.

## 2018-03-27 NOTE — LETTER
[facility]  Lisbet Kline M.D.  [address]  [place] [address]  Fax: [phone]   Authorization for Release/Disclosure of   Protected Health Information   Name: KRISTIAN ART : 1955 SSN: [id number]   Address: [address] [place] [address] Phone:    [phone] (home)    I authorize the entity listed below to release/disclose the PHI below to:   [facility]/Lisbet Kline M.D. and Lisbet Kline M.D.   Provider or Entity Name:  Tello Fuller O.D.   Address   City, [facility]   Phone:      Fax:Fax: [phone]     Reason for request: continuity of care   Information to be released:    [  ] LAST COLONOSCOPY,  including any PATH REPORT and follow-up  [  ] LAST FIT/COLOGUARD RESULT [  ] LAST DEXA  [  ] LAST MAMMOGRAM  [  ] LAST PAP  [  ] LAST LABS [  ] RETINA EXAM REPORT  [  ] IMMUNIZATION RECORDS  [  ] Release all info      [  ] Check here and initial the line next to each item to release ALL health information INCLUDING  _____ Care and treatment for drug and / or alcohol abuse  _____ HIV testing, infection status, or AIDS  _____ Genetic Testing    DATES OF SERVICE OR TIME PERIOD TO BE DISCLOSED: _____________  I understand and acknowledge that:  * This Authorization may be revoked at any time by you in writing, except if your health information has already been used or disclosed.  * Your health information that will be used or disclosed as a result of you signing this authorization could be re-disclosed by the recipient. If this occurs, your re-disclosed health information may no longer be protected by State or Federal laws.  * You may refuse to sign this Authorization. Your refusal will not affect your ability to obtain treatment.  * This Authorization becomes effective upon signing and will  on (date) __________.      If no date is indicated, this Authorization will  one (1) year from the signature date.    Name: Kristian ALDRIDGE  Zac    Signature:  CONTINU[facility] O CARE Date:     3/27/2018       PLEASE FAX REQUESTED RECORDS BACK TO: [phone]

## 2018-04-02 ENCOUNTER — HOSPITAL ENCOUNTER (OUTPATIENT)
Dept: RADIOLOGY | Facility: MEDICAL CENTER | Age: 63
End: 2018-04-02
Attending: FAMILY MEDICINE
Payer: COMMERCIAL

## 2018-04-02 ENCOUNTER — TELEPHONE (OUTPATIENT)
Dept: MEDICAL GROUP | Facility: LAB | Age: 63
End: 2018-04-02

## 2018-04-02 DIAGNOSIS — M54.12 LEFT CERVICAL RADICULOPATHY: ICD-10-CM

## 2018-04-02 DIAGNOSIS — R29.898 LEFT HAND WEAKNESS: ICD-10-CM

## 2018-04-02 DIAGNOSIS — I65.29 CAROTID ATHEROSCLEROSIS, UNSPECIFIED LATERALITY: ICD-10-CM

## 2018-04-02 PROCEDURE — 93880 EXTRACRANIAL BILAT STUDY: CPT

## 2018-04-02 NOTE — TELEPHONE ENCOUNTER
Riya calling from [facility] [phone]. She states Dr. Jimenez is requesting an MRI cervical spine on pt. He can put order in day of but would like to have this done prior to appt.   Please fax order to [phone]

## 2018-04-03 ENCOUNTER — HOSPITAL ENCOUNTER (OUTPATIENT)
Dept: RADIOLOGY | Facility: MEDICAL CENTER | Age: 63
End: 2018-04-03
Attending: FAMILY MEDICINE
Payer: COMMERCIAL

## 2018-04-03 DIAGNOSIS — R29.898 LEFT HAND WEAKNESS: ICD-10-CM

## 2018-04-03 DIAGNOSIS — M54.12 LEFT CERVICAL RADICULOPATHY: ICD-10-CM

## 2018-04-03 PROCEDURE — 72141 MRI NECK SPINE W/O DYE: CPT

## 2018-05-11 ENCOUNTER — HOSPITAL ENCOUNTER (OUTPATIENT)
Dept: LAB | Facility: MEDICAL CENTER | Age: 63
End: 2018-05-11
Attending: FAMILY MEDICINE
Payer: COMMERCIAL

## 2018-05-11 ENCOUNTER — HOSPITAL ENCOUNTER (OUTPATIENT)
Dept: LAB | Facility: MEDICAL CENTER | Age: 63
End: 2018-05-11
Attending: INTERNAL MEDICINE
Payer: COMMERCIAL

## 2018-05-11 DIAGNOSIS — E55.9 VITAMIN D DEFICIENCY DISEASE: ICD-10-CM

## 2018-05-11 DIAGNOSIS — E03.9 HYPOTHYROIDISM, UNSPECIFIED TYPE: ICD-10-CM

## 2018-05-11 LAB
25(OH)D3 SERPL-MCNC: 46 NG/ML (ref 30–100)
EST. AVERAGE GLUCOSE BLD GHB EST-MCNC: 148 MG/DL
HBA1C MFR BLD: 6.8 % (ref 0–5.6)
T4 FREE SERPL-MCNC: 1.14 NG/DL (ref 0.53–1.43)
TSH SERPL DL<=0.005 MIU/L-ACNC: 0.19 UIU/ML (ref 0.38–5.33)

## 2018-05-11 PROCEDURE — 82306 VITAMIN D 25 HYDROXY: CPT

## 2018-05-11 PROCEDURE — 36415 COLL VENOUS BLD VENIPUNCTURE: CPT

## 2018-05-11 PROCEDURE — 84439 ASSAY OF FREE THYROXINE: CPT

## 2018-05-11 PROCEDURE — 84443 ASSAY THYROID STIM HORMONE: CPT

## 2018-05-11 PROCEDURE — 83036 HEMOGLOBIN GLYCOSYLATED A1C: CPT

## 2018-06-22 ENCOUNTER — OFFICE VISIT (OUTPATIENT)
Dept: MEDICAL GROUP | Facility: MEDICAL CENTER | Age: 63
End: 2018-06-22
Payer: COMMERCIAL

## 2018-06-22 VITALS
HEART RATE: 83 BPM | HEIGHT: 66 IN | DIASTOLIC BLOOD PRESSURE: 76 MMHG | WEIGHT: 201.8 LBS | TEMPERATURE: 96.8 F | BODY MASS INDEX: 32.43 KG/M2 | RESPIRATION RATE: 16 BRPM | SYSTOLIC BLOOD PRESSURE: 118 MMHG | OXYGEN SATURATION: 95 %

## 2018-06-22 DIAGNOSIS — E78.5 HYPERLIPIDEMIA, UNSPECIFIED HYPERLIPIDEMIA TYPE: ICD-10-CM

## 2018-06-22 DIAGNOSIS — Z12.5 SCREENING FOR MALIGNANT NEOPLASM OF PROSTATE: ICD-10-CM

## 2018-06-22 DIAGNOSIS — E03.4 HYPOTHYROIDISM DUE TO ACQUIRED ATROPHY OF THYROID: ICD-10-CM

## 2018-06-22 DIAGNOSIS — E66.9 OBESITY (BMI 30-39.9): ICD-10-CM

## 2018-06-22 DIAGNOSIS — E11.9 TYPE 2 DIABETES MELLITUS WITH HEMOGLOBIN A1C GOAL OF LESS THAN 7.0% (HCC): ICD-10-CM

## 2018-06-22 DIAGNOSIS — Z76.89 ENCOUNTER TO ESTABLISH CARE: ICD-10-CM

## 2018-06-22 DIAGNOSIS — F17.210 CIGARETTE NICOTINE DEPENDENCE WITHOUT COMPLICATION: ICD-10-CM

## 2018-06-22 DIAGNOSIS — F10.20 UNCOMPLICATED ALCOHOL DEPENDENCE (HCC): ICD-10-CM

## 2018-06-22 PROCEDURE — 99213 OFFICE O/P EST LOW 20 MIN: CPT | Performed by: NURSE PRACTITIONER

## 2018-06-22 NOTE — PROGRESS NOTES
CC: Establish care and request for prostate labs      Kristian Frank is a 63 y.o. male here to establish care and to discuss the evaluation and management of:    1. Screening for malignant neoplasm of prostate  Patient states that he is requesting a PSA test as he is due for one and has never had one. Denies any hematuria, frequency, urgency, pelvic pain or dysuria.    2. Type 2 diabetes mellitus with hemoglobin A1c goal of less than 7.0% ([facility])  Patient states that his diabetes for approximately 20 years. States he is followed up by his endocrinologist and sees him every 6 months. Next appointment in November. Most recent A1c was 6.8% on May 18 of this year. States he takes his blood sugars every other day and his fasting blood sugar most recently was 138. States he does get an eye examination was told that he does not have any diabetic retinopathy. Denies any hypoglycemic events. It currently not taking any aspirin. Denies any nonhealing wounds or sores on his feet. States he does have some neuropathy in his left foot that comes and goes.    3. Hyperlipidemia, unspecified hyperlipidemia type  Patient states that he is currently taking simvastatin for his elevated cholesterol. Denies any myalgias.    4. Hypothyroidism due to acquired atrophy of thyroid  Patient states that he does have hypothyroidism. States that his most recent TSH he was overcorrected at 0.190 he was told to take 200 µg of his thyroid medication 6 days a week and then skip one day. States he's been doing so.    5. Cigarette nicotine dependence without complication  Patient currently smokes about half pack a day. Has quit in the past however difficult to remain tobacco free.    6. Uncomplicated alcohol dependence (HCC)  Patient states he does have approximately about 24 beers a week. Patient is a home lopez.    7. Obesity (BMI 30-39.9)  Patient does not exercise.    8. Encounter to establish care  Former patient of Dr. Kline      ROS:   Denies any Headache, Blurred Vision, Confusion Chest pain,  Shortness of breath,  Abdominal pain, Changes of bowel or bladder, Lower ext edema, Fevers, Nights sweats, Weight Changes, Focal weakness or numbness.  All other systems are negative. Reports occasional neuropathy in his feet      Current Outpatient Prescriptions:   •  vitamin D (CHOLECALCIFEROL) 1000 UNIT Tab, Take 3 Tabs by mouth every day., Disp: 60 Tab, Rfl:   •  SYNTHROID 200 MCG Tab, TAKE 1 TABLET BY MOUTH EVERY DAY, Disp: 90 Tab, Rfl: 1  •  simvastatin (ZOCOR) 40 MG Tab, TAKE 1 TABLET BY MOUTH EVERY EVENING., Disp: 90 Tab, Rfl: 2  •  metformin (GLUCOPHAGE) 1000 MG tablet, TAKE 1 TABLET BY MOUTH 2 TIMES DAILY WITH MEALS, Disp: 180 Tab, Rfl: 2  •  lisinopril (PRINIVIL) 10 MG Tab, TAKE 1 TABLET BY MOUTH EVERY DAY, Disp: 90 Tab, Rfl: 2  •  FARXIGA 5 MG Tab, TAKE 1 TABLET DAILY, Disp: 90 Tab, Rfl: 2  •  Blood Glucose Monitoring Suppl Supplies Misc, Test strips for One Touch Ultra Blue meter. Sig: use BID and prn ssx high or low sugar #100 RF x 3, Disp: 100 Strip, Rfl: 11  •  VICTOZA 18 MG/3ML Solution Pen-injector injection, INJECT 0.3 ML AS INSTRUCTED EVERY DAY., Disp: 3 mL, Rfl: 3  •  B-D ULTRAFINE III SHORT PEN 31G X 8 MM Misc, USE DAILY, Disp: 100 Each, Rfl: 2    No Known Allergies    Past Medical History:   Diagnosis Date   • Back pain     ddd   • DIABETES MELLITUS    • Diverticulitis    • GOUT    • Hyperlipidemia    • Hypothyroid    • Pneumonia    • Post-nasal drip    • Rhinitis      Past Surgical History:   Procedure Laterality Date   • APPENDECTOMY     • COLON RESECTION       Family History   Problem Relation Age of Onset   • Diabetes Mother    • Hypertension Mother    • Hyperlipidemia Mother    • Heart Disease Father    • Hypertension Father    • Hyperlipidemia Father    • Heart Attack Father      Social History     Social History   • Marital status:      Spouse name: N/A   • Number of children: N/A   • Years of education: N/A  "    Occupational History   • Not on file.     Social History Main Topics   • Smoking status: Current Every Day Smoker     Packs/day: 0.50   • Smokeless tobacco: Never Used   • Alcohol use 14.4 oz/week     24 Cans of beer per week   • Drug use: Yes     Types: Marijuana, Inhaled   • Sexual activity: Yes     Partners: Female     Other Topics Concern   • Not on file     Social History Narrative   • No narrative on file       Objective:     Vitals: /76   Pulse 83   Temp 36 °C (96.8 °F)   Resp 16   Ht 1.676 m (5' 6\")   Wt 91.5 kg (201 lb 12.8 oz)   SpO2 95%   BMI 32.57 kg/m²      General: Alert, pleasant, NAD  HEENT:  Normocephalic.  Neck supple.  No thyromegaly or masses palpated. No cervical or supraclavicular lymphadenopathy.  Heart:  Regular rate and rhythm.  S1 and S2 normal.  No murmurs appreciated.  Respiratory:  Normal respiratory effort.  Clear to auscultation bilaterally.    Skin:  Warm, dry, no rashes  Musculoskeletal:  Gait is normal.  Moves all extremities well.  Extremities:  . No leg edema.  Neurological: No tremors, sensation grossly intact,  Psych:  Affect/mood is normal, judgement is good, memory is intact, grooming is appropriate.      Assessment and Plan.   63 y.o. male to establish care and discuss following    1. Screening for malignant neoplasm of prostate  Has not had a PSA drawn.  - PROSTATE SPECIFIC AG SCREENING; Future    2. Type 2 diabetes mellitus with hemoglobin A1c goal of less than 7.0% (HCC)  Chronic. Most recent A1c was 6.8%. Continue taking current regimen. Followed by endocrinology next appointment in November. Discussed with patient the importance of obtaining good glycemic control as to prevent further complications of diabetes. Encouraged patient to have a heart healthy diet that includes whole grains, fruits, vegetables and limited sugars/fats. Encouraged physical activity at least 5 days per week.    3. Hyperlipidemia, unspecified hyperlipidemia type  Stable. Most " recent lipid panel was in November 2017. Total cholesterol is 138, triglycerides 287. Continue simvastatin, no myalgias.. Discussed with patient the importance of a heart healthy diet rich in fruits, vegetables, low-fat dairy products, whole grain, poultry, fish, legumes, nuts, and vegetable oil. Limit intake of red meat, sweets, sugar-containing beverages, trans-fats, sodium, and restrict intake of saturated fat to 5-6% of total daily calories.    4. Hypothyroidism due to acquired atrophy of thyroid  Currently overcorrected. Most recent TSH was 0.190, was recently changed over to taking levothyroxine 200 µg 6 days a week and skipping one day. Denies any heart palpitations, heart racing, constipation, intolerance to cold or heat at this time. Repeat TSH in 6 weeks from recent dose change.  TSH WITH REFLEX TO FT4; Future     5. Cigarette nicotine dependence without complication  Discussed with patient the importance of reducing tobacco consumption. Educated patient regarding the effects of tobacco use on cardiovascular system.    6. Uncomplicated alcohol dependence (HCC)  Patient currently having about 24 beers per week. He is also a home lopez.    7. Obesity (BMI 30-39.9)  Chronic. Patient encouraged to reduce excess calorie consumption. Encouraged regular exercise. Discussed long term sequelae of obesity.   - Patient identified as having weight management issue.  Appropriate orders and counseling given.    8. Encounter to establish care        Health Maintenance:     Return in about 6 months (around 12/22/2018).          Vidhi QUINTERO

## 2018-06-26 ENCOUNTER — HOSPITAL ENCOUNTER (OUTPATIENT)
Dept: LAB | Facility: MEDICAL CENTER | Age: 63
End: 2018-06-26
Attending: NURSE PRACTITIONER
Payer: COMMERCIAL

## 2018-06-26 DIAGNOSIS — Z12.5 SCREENING FOR MALIGNANT NEOPLASM OF PROSTATE: ICD-10-CM

## 2018-06-26 DIAGNOSIS — E03.4 HYPOTHYROIDISM DUE TO ACQUIRED ATROPHY OF THYROID: ICD-10-CM

## 2018-06-26 LAB
PSA SERPL-MCNC: 2.08 NG/ML (ref 0–4)
TSH SERPL DL<=0.005 MIU/L-ACNC: 0.93 UIU/ML (ref 0.38–5.33)

## 2018-06-26 PROCEDURE — 84443 ASSAY THYROID STIM HORMONE: CPT

## 2018-06-26 PROCEDURE — 84153 ASSAY OF PSA TOTAL: CPT

## 2018-06-26 PROCEDURE — 36415 COLL VENOUS BLD VENIPUNCTURE: CPT

## 2018-10-05 RX ORDER — LEVOTHYROXINE SODIUM 200 MCG
200 TABLET ORAL
Qty: 90 TAB | Refills: 3 | Status: SHIPPED | OUTPATIENT
Start: 2018-10-05 | End: 2019-08-05 | Stop reason: SDUPTHER

## 2018-12-31 DIAGNOSIS — E03.4 HYPOTHYROIDISM DUE TO ACQUIRED ATROPHY OF THYROID: Chronic | ICD-10-CM

## 2018-12-31 DIAGNOSIS — E55.9 VITAMIN D DEFICIENCY DISEASE: Chronic | ICD-10-CM

## 2018-12-31 DIAGNOSIS — E78.5 HYPERLIPIDEMIA, UNSPECIFIED HYPERLIPIDEMIA TYPE: Chronic | ICD-10-CM

## 2018-12-31 DIAGNOSIS — E11.9 TYPE 2 DIABETES MELLITUS WITH HEMOGLOBIN A1C GOAL OF LESS THAN 7.0% (HCC): Chronic | ICD-10-CM

## 2019-01-15 ENCOUNTER — HOSPITAL ENCOUNTER (OUTPATIENT)
Dept: LAB | Facility: MEDICAL CENTER | Age: 64
End: 2019-01-15
Attending: NURSE PRACTITIONER
Payer: COMMERCIAL

## 2019-01-15 ENCOUNTER — PATIENT MESSAGE (OUTPATIENT)
Dept: MEDICAL GROUP | Facility: MEDICAL CENTER | Age: 64
End: 2019-01-15

## 2019-01-15 DIAGNOSIS — E03.4 HYPOTHYROIDISM DUE TO ACQUIRED ATROPHY OF THYROID: Chronic | ICD-10-CM

## 2019-01-15 DIAGNOSIS — E11.9 TYPE 2 DIABETES MELLITUS WITH HEMOGLOBIN A1C GOAL OF LESS THAN 7.0% (HCC): Chronic | ICD-10-CM

## 2019-01-15 DIAGNOSIS — E55.9 VITAMIN D DEFICIENCY DISEASE: Chronic | ICD-10-CM

## 2019-01-15 DIAGNOSIS — E78.5 HYPERLIPIDEMIA, UNSPECIFIED HYPERLIPIDEMIA TYPE: ICD-10-CM

## 2019-01-15 DIAGNOSIS — Z87.19 HX OF DIVERTICULITIS OF COLON: ICD-10-CM

## 2019-01-15 DIAGNOSIS — E78.5 HYPERLIPIDEMIA, UNSPECIFIED HYPERLIPIDEMIA TYPE: Chronic | ICD-10-CM

## 2019-01-15 LAB
25(OH)D3 SERPL-MCNC: 37 NG/ML (ref 30–100)
ALBUMIN SERPL BCP-MCNC: 4.5 G/DL (ref 3.2–4.9)
ALBUMIN/GLOB SERPL: 1.8 G/DL
ALP SERPL-CCNC: 44 U/L (ref 30–99)
ALT SERPL-CCNC: 25 U/L (ref 2–50)
ANION GAP SERPL CALC-SCNC: 7 MMOL/L (ref 0–11.9)
AST SERPL-CCNC: 15 U/L (ref 12–45)
BILIRUB SERPL-MCNC: 0.5 MG/DL (ref 0.1–1.5)
BUN SERPL-MCNC: 21 MG/DL (ref 8–22)
CALCIUM SERPL-MCNC: 10.3 MG/DL (ref 8.5–10.5)
CHLORIDE SERPL-SCNC: 105 MMOL/L (ref 96–112)
CHOLEST SERPL-MCNC: 150 MG/DL (ref 100–199)
CO2 SERPL-SCNC: 26 MMOL/L (ref 20–33)
CREAT SERPL-MCNC: 1.16 MG/DL (ref 0.5–1.4)
CREAT UR-MCNC: 100.8 MG/DL
EST. AVERAGE GLUCOSE BLD GHB EST-MCNC: 163 MG/DL
FASTING STATUS PATIENT QL REPORTED: NORMAL
GLOBULIN SER CALC-MCNC: 2.5 G/DL (ref 1.9–3.5)
GLUCOSE SERPL-MCNC: 146 MG/DL (ref 65–99)
HBA1C MFR BLD: 7.3 % (ref 0–5.6)
HDLC SERPL-MCNC: 38 MG/DL
LDLC SERPL CALC-MCNC: 51 MG/DL
MICROALBUMIN UR-MCNC: 0.8 MG/DL
MICROALBUMIN/CREAT UR: 8 MG/G (ref 0–30)
POTASSIUM SERPL-SCNC: 5 MMOL/L (ref 3.6–5.5)
PROT SERPL-MCNC: 7 G/DL (ref 6–8.2)
SODIUM SERPL-SCNC: 138 MMOL/L (ref 135–145)
TRIGL SERPL-MCNC: 303 MG/DL (ref 0–149)
TSH SERPL DL<=0.005 MIU/L-ACNC: 1.71 UIU/ML (ref 0.38–5.33)

## 2019-01-15 PROCEDURE — 82306 VITAMIN D 25 HYDROXY: CPT

## 2019-01-15 PROCEDURE — 84443 ASSAY THYROID STIM HORMONE: CPT

## 2019-01-15 PROCEDURE — 83036 HEMOGLOBIN GLYCOSYLATED A1C: CPT

## 2019-01-15 PROCEDURE — 82570 ASSAY OF URINE CREATININE: CPT

## 2019-01-15 PROCEDURE — 80053 COMPREHEN METABOLIC PANEL: CPT

## 2019-01-15 PROCEDURE — 36415 COLL VENOUS BLD VENIPUNCTURE: CPT

## 2019-01-15 PROCEDURE — 82043 UR ALBUMIN QUANTITATIVE: CPT

## 2019-01-15 PROCEDURE — 80061 LIPID PANEL: CPT

## 2019-01-15 RX ORDER — LISINOPRIL 10 MG/1
10 TABLET ORAL
Qty: 90 TAB | Refills: 3 | Status: SHIPPED | OUTPATIENT
Start: 2019-01-15 | End: 2019-01-21 | Stop reason: SDUPTHER

## 2019-01-15 RX ORDER — SIMVASTATIN 40 MG
TABLET ORAL
Qty: 90 TAB | Refills: 3 | Status: SHIPPED | OUTPATIENT
Start: 2019-01-15 | End: 2019-01-21 | Stop reason: SDUPTHER

## 2019-01-16 RX ORDER — LISINOPRIL 10 MG/1
10 TABLET ORAL
Qty: 90 TAB | Refills: 3 | Status: SHIPPED | OUTPATIENT
Start: 2019-01-16 | End: 2019-01-21 | Stop reason: CLARIF

## 2019-01-16 RX ORDER — SIMVASTATIN 40 MG
TABLET ORAL
Qty: 90 TAB | Refills: 3 | Status: SHIPPED | OUTPATIENT
Start: 2019-01-16 | End: 2019-01-21 | Stop reason: CLARIF

## 2019-01-21 ENCOUNTER — OFFICE VISIT (OUTPATIENT)
Dept: MEDICAL GROUP | Facility: MEDICAL CENTER | Age: 64
End: 2019-01-21
Payer: COMMERCIAL

## 2019-01-21 VITALS
SYSTOLIC BLOOD PRESSURE: 110 MMHG | HEIGHT: 66 IN | BODY MASS INDEX: 31.5 KG/M2 | TEMPERATURE: 97.3 F | HEART RATE: 82 BPM | WEIGHT: 196 LBS | DIASTOLIC BLOOD PRESSURE: 78 MMHG | OXYGEN SATURATION: 99 % | RESPIRATION RATE: 16 BRPM

## 2019-01-21 DIAGNOSIS — E11.9 TYPE 2 DIABETES MELLITUS WITH HEMOGLOBIN A1C GOAL OF LESS THAN 7.0% (HCC): Chronic | ICD-10-CM

## 2019-01-21 DIAGNOSIS — F17.210 CIGARETTE NICOTINE DEPENDENCE WITHOUT COMPLICATION: ICD-10-CM

## 2019-01-21 DIAGNOSIS — I10 ESSENTIAL HYPERTENSION: ICD-10-CM

## 2019-01-21 DIAGNOSIS — E55.9 VITAMIN D DEFICIENCY DISEASE: Chronic | ICD-10-CM

## 2019-01-21 DIAGNOSIS — L82.1 SEBORRHEIC KERATOSES: ICD-10-CM

## 2019-01-21 DIAGNOSIS — M65.30 TRIGGER FINGER OF LEFT HAND, UNSPECIFIED FINGER: ICD-10-CM

## 2019-01-21 DIAGNOSIS — E03.4 HYPOTHYROIDISM DUE TO ACQUIRED ATROPHY OF THYROID: Chronic | ICD-10-CM

## 2019-01-21 DIAGNOSIS — D22.9 SUSPICIOUS NEVUS: ICD-10-CM

## 2019-01-21 DIAGNOSIS — E78.5 DYSLIPIDEMIA: Chronic | ICD-10-CM

## 2019-01-21 PROCEDURE — 99214 OFFICE O/P EST MOD 30 MIN: CPT | Performed by: NURSE PRACTITIONER

## 2019-01-21 RX ORDER — SIMVASTATIN 40 MG
TABLET ORAL
Qty: 90 TAB | Refills: 3 | Status: SHIPPED | OUTPATIENT
Start: 2019-01-21 | End: 2019-08-05 | Stop reason: SDUPTHER

## 2019-01-21 RX ORDER — DAPAGLIFLOZIN 5 MG/1
1 TABLET, FILM COATED ORAL DAILY
Qty: 90 TAB | Refills: 3 | Status: SHIPPED | OUTPATIENT
Start: 2019-01-21 | End: 2019-03-22 | Stop reason: SDUPTHER

## 2019-01-21 RX ORDER — LISINOPRIL 10 MG/1
10 TABLET ORAL
Qty: 90 TAB | Refills: 3 | Status: SHIPPED | OUTPATIENT
Start: 2019-01-21 | End: 2019-07-22

## 2019-01-21 RX ORDER — GLIMEPIRIDE 1 MG/1
1 TABLET ORAL EVERY MORNING
COMMUNITY
End: 2019-01-21 | Stop reason: SDUPTHER

## 2019-01-21 RX ORDER — GLIMEPIRIDE 1 MG/1
1 TABLET ORAL EVERY MORNING
Qty: 90 TAB | Refills: 3 | Status: SHIPPED | OUTPATIENT
Start: 2019-01-21 | End: 2019-07-03 | Stop reason: SDUPTHER

## 2019-01-21 NOTE — PROGRESS NOTES
cc:  Follow up diabetes/medication refill/lab review      Subjective:     HPI:     Kristian Frank is a 63 y.o. male here to discuss the evaluation and management of:    Diabetes  He follows up with endocrinology for this, last appointment was in December.  He states his been taking his for Farxiga, metformin, glimepiride, Victoza for this.  Checks blood sugar before breakfast. Fasting about 135. Mostly right foot. No wounds or sores on his feet.  He does state that his big toe on his right foot and the ball of his foot has reduced sensation.    Cholesterol  Tolerating atorvastatin without myalgias.  Last lipid panel was January 2018.  Slight elevation in his triglycerides at 303.    Hypothyroidism  Taking Synthroid 200mcg 5 days a week. Most recent TSH 1.710    Vitamin D deficiency  Taking Vitamin D3 3,000IU daily for this.  Last vitamin D level was 37.    Cigarette use  States has about 1/2 pack per day. Not interested in quitting at this time.    Trigger finger  Requesting referral back to orthopedics for injection.    Skin lesion  Patient states that he noticed over the summertime that he has a spot on his left forearm and a spot on the left temple area along the hairline.  Denies any pain, denies any flaking or peeling or bleeding.  He also states that there is a spot on his back that he has had for bleed a few years.  Is it raised, brown and rough.  History of squamous cell on his leg.      ROS:  Denies any Headache, Blurred Vision, Confusion, Chest pain,  Shortness of breath,  Abdominal pain, Changes of bowel or bladder, Lower ext edema, Fevers, Nights sweats, Weight Changes, Focal weakness or numbness.  And all other systems are negative.        Current Outpatient Prescriptions:   •  simvastatin (ZOCOR) 40 MG Tab, TAKE 1 TABLET BY MOUTH EVERY EVENING., Disp: 90 Tab, Rfl: 3  •  Dapagliflozin Propanediol (FARXIGA) 5 MG Tab, Take 1 Tab by mouth every day., Disp: 90 Tab, Rfl: 3  •  metformin (GLUCOPHAGE)  1000 MG tablet, Take 1 Tab by mouth 2 times a day, with meals., Disp: 180 Tab, Rfl: 3  •  lisinopril (PRINIVIL) 10 MG Tab, Take 1 Tab by mouth every day., Disp: 90 Tab, Rfl: 3  •  glimepiride (AMARYL) 1 MG tablet, Take 1 Tab by mouth every morning., Disp: 90 Tab, Rfl: 3  •  SYNTHROID 200 MCG Tab, Take 1 Tab by mouth every day. On an empty stomach., Disp: 90 Tab, Rfl: 3  •  vitamin D (CHOLECALCIFEROL) 1000 UNIT Tab, Take 3 Tabs by mouth every day., Disp: 60 Tab, Rfl:   •  Blood Glucose Monitoring Suppl Supplies Misc, Test strips for One Touch Ultra Blue meter. Sig: use BID and prn ssx high or low sugar #100 RF x 3, Disp: 100 Strip, Rfl: 11  •  VICTOZA 18 MG/3ML Solution Pen-injector injection, INJECT 0.3 ML AS INSTRUCTED EVERY DAY., Disp: 3 mL, Rfl: 3  •  B-D ULTRAFINE III SHORT PEN 31G X 8 MM Misc, USE DAILY, Disp: 100 Each, Rfl: 2    No Known Allergies    Past Medical History:   Diagnosis Date   • Back pain     ddd   • DIABETES MELLITUS    • Diverticulitis    • GOUT    • Hyperlipidemia    • Hypothyroid    • Pneumonia    • Post-nasal drip    • Rhinitis      Past Surgical History:   Procedure Laterality Date   • APPENDECTOMY     • COLON RESECTION       Family History   Problem Relation Age of Onset   • Diabetes Mother    • Hypertension Mother    • Hyperlipidemia Mother    • Heart Disease Father    • Hypertension Father    • Hyperlipidemia Father    • Heart Attack Father      Social History     Social History   • Marital status:      Spouse name: N/A   • Number of children: N/A   • Years of education: N/A     Occupational History   • Not on file.     Social History Main Topics   • Smoking status: Current Every Day Smoker     Packs/day: 0.50   • Smokeless tobacco: Never Used   • Alcohol use 14.4 oz/week     24 Cans of beer per week   • Drug use: Yes     Types: Marijuana, Inhaled   • Sexual activity: Yes     Partners: Female     Other Topics Concern   • Not on file     Social History Narrative   • No narrative on  "file       Objective:     Vitals: /78   Pulse 82   Temp 36.3 °C (97.3 °F)   Resp 16   Ht 1.676 m (5' 6\")   Wt 88.9 kg (196 lb)   SpO2 99%   BMI 31.64 kg/m²    General: Alert, pleasant, NAD  HEENT: Normocephalic.    Heart: Regular rate and rhythm.  S1 and S2 normal.  No murmurs appreciated.  Respiratory: Normal respiratory effort. Clear to auscultation bilaterally.  Skin: Warm, dry. Left forearm with 3 mm annular brown seborrheic keratosis, Seborrheic keratosis  >1cm on mid back.   Musculoskeletal: Gait is normal.  Moves all extremities well.  Extremities: No leg edema. No discoloration  Neurological: No tremors, sensation grossly intact, gait is normal,   Psych:  Affect/mood is normal, judgement is good, memory is intact, grooming is appropriate.    Assessment/Plan:     Kristian ALDRIDGE was seen today for follow-up and medication refill.    Diagnoses and all orders for this visit:    Type 2 diabetes mellitus with hemoglobin A1c goal of less than 7.0% ([facility])  Chronic.  Followed by endocrinology.  Most recent A1c was 7.3 in January labs.  Slight increase from the previous labs.  Monofilament exam completed in clinic today with intact sensation.  No sores, positive pedal pulses.  Continue current regimen.  Again encouraged cessation of smoking    -     Dapagliflozin Propanediol (FARXIGA) 5 MG Tab; Take 1 Tab by mouth every day.  -     metformin (GLUCOPHAGE) 1000 MG tablet; Take 1 Tab by mouth 2 times a day, with meals.  -     glimepiride (AMARYL) 1 MG tablet; Take 1 Tab by mouth every morning.    Essential hypertension  Chronic.  Well-controlled in clinic today.  Tolerating lisinopril.  Denies any shortness of breath, dizziness, cough, leg swelling.  -     lisinopril (PRINIVIL) 10 MG Tab; Take 1 Tab by mouth every day.    Dyslipidemia  Chronic.  Not well controlled.  Most recent labs show an elevation in his triglycerides.  Continue simvastatin 40 mg tolerating without any myalgias.  Have discussed heart healthy " diet.  -     simvastatin (ZOCOR) 40 MG Tab; TAKE 1 TABLET BY MOUTH EVERY EVENING.    Hypothyroidism due to acquired atrophy of thyroid  Chronic.  Stable on current regimen.  He is currently taking Synthroid 200 mcg 5 days a week.  Most recent TSH was within normal limits.    Vitamin D deficiency disease  Chronic.  Stable according to most recent labs.  Continue current regimen of vitamin D3.    Cigarette nicotine dependence without complication  Discussed with patient the importance of reducing tobacco consumption. Educated patient regarding the effects of tobacco use on cardiovascular system.  Not interested in quitting at this time.    Suspicious nevus  Seborrheic keratoses  Large seborrheic keratosis on his mid back.  Left forearm with approximately 3 mm sized annular lesion light brown in color.  Patient does have a history of having squamous cell carcinoma.  Will refer to dermatology.    -     REFERRAL TO DERMATOLOGY    Trigger finger of left hand, unspecified finger  Requesting referral to orthopedics for a another injection for his trigger finger.  -     REFERRAL TO ORTHOPEDICS        Return in about 6 months (around 7/21/2019).          Vidhi QUINTERO

## 2019-02-21 ENCOUNTER — APPOINTMENT (RX ONLY)
Dept: URBAN - METROPOLITAN AREA CLINIC 22 | Facility: CLINIC | Age: 64
Setting detail: DERMATOLOGY
End: 2019-02-21

## 2019-02-21 DIAGNOSIS — D18.0 HEMANGIOMA: ICD-10-CM

## 2019-02-21 DIAGNOSIS — D22 MELANOCYTIC NEVI: ICD-10-CM

## 2019-02-21 DIAGNOSIS — B07.8 OTHER VIRAL WARTS: ICD-10-CM

## 2019-02-21 DIAGNOSIS — L57.8 OTHER SKIN CHANGES DUE TO CHRONIC EXPOSURE TO NONIONIZING RADIATION: ICD-10-CM

## 2019-02-21 DIAGNOSIS — L91.8 OTHER HYPERTROPHIC DISORDERS OF THE SKIN: ICD-10-CM

## 2019-02-21 DIAGNOSIS — Z71.89 OTHER SPECIFIED COUNSELING: ICD-10-CM

## 2019-02-21 DIAGNOSIS — L81.4 OTHER MELANIN HYPERPIGMENTATION: ICD-10-CM

## 2019-02-21 DIAGNOSIS — L57.0 ACTINIC KERATOSIS: ICD-10-CM

## 2019-02-21 DIAGNOSIS — L82.1 OTHER SEBORRHEIC KERATOSIS: ICD-10-CM

## 2019-02-21 DIAGNOSIS — Z85.828 PERSONAL HISTORY OF OTHER MALIGNANT NEOPLASM OF SKIN: ICD-10-CM

## 2019-02-21 PROBLEM — D18.01 HEMANGIOMA OF SKIN AND SUBCUTANEOUS TISSUE: Status: ACTIVE | Noted: 2019-02-21

## 2019-02-21 PROBLEM — E03.9 HYPOTHYROIDISM, UNSPECIFIED: Status: ACTIVE | Noted: 2019-02-21

## 2019-02-21 PROBLEM — D22.5 MELANOCYTIC NEVI OF TRUNK: Status: ACTIVE | Noted: 2019-02-21

## 2019-02-21 PROBLEM — I10 ESSENTIAL (PRIMARY) HYPERTENSION: Status: ACTIVE | Noted: 2019-02-21

## 2019-02-21 PROCEDURE — ? COUNSELING

## 2019-02-21 PROCEDURE — ? LIQUID NITROGEN

## 2019-02-21 PROCEDURE — 99203 OFFICE O/P NEW LOW 30 MIN: CPT | Mod: 25

## 2019-02-21 PROCEDURE — ? REFERRAL CORRESPONDENCE

## 2019-02-21 PROCEDURE — 17003 DESTRUCT PREMALG LES 2-14: CPT

## 2019-02-21 PROCEDURE — 17000 DESTRUCT PREMALG LESION: CPT

## 2019-02-21 ASSESSMENT — LOCATION DETAILED DESCRIPTION DERM
LOCATION DETAILED: LEFT MEDIAL FRONTAL SCALP
LOCATION DETAILED: EPIGASTRIC SKIN
LOCATION DETAILED: LEFT SUPERIOR MEDIAL UPPER BACK
LOCATION DETAILED: RIGHT SCAPHA
LOCATION DETAILED: SUPERIOR MID FOREHEAD
LOCATION DETAILED: RIGHT SUPERIOR ANTERIOR NECK
LOCATION DETAILED: LEFT INFERIOR FOREHEAD
LOCATION DETAILED: LEFT SUPERIOR FOREHEAD
LOCATION DETAILED: LEFT AXILLARY VAULT
LOCATION DETAILED: RIGHT SUPERIOR MEDIAL MIDBACK
LOCATION DETAILED: MID POSTERIOR NECK
LOCATION DETAILED: LEFT NASAL DORSUM
LOCATION DETAILED: RIGHT ANTERIOR PROXIMAL THIGH
LOCATION DETAILED: LEFT FOREHEAD
LOCATION DETAILED: RIGHT KNEE

## 2019-02-21 ASSESSMENT — LOCATION SIMPLE DESCRIPTION DERM
LOCATION SIMPLE: RIGHT THIGH
LOCATION SIMPLE: RIGHT KNEE
LOCATION SIMPLE: LEFT AXILLARY VAULT
LOCATION SIMPLE: ABDOMEN
LOCATION SIMPLE: LEFT SCALP
LOCATION SIMPLE: RIGHT LOWER BACK
LOCATION SIMPLE: RIGHT EAR
LOCATION SIMPLE: NOSE
LOCATION SIMPLE: RIGHT ANTERIOR NECK
LOCATION SIMPLE: POSTERIOR NECK
LOCATION SIMPLE: LEFT FOREHEAD
LOCATION SIMPLE: SUPERIOR FOREHEAD
LOCATION SIMPLE: LEFT UPPER BACK

## 2019-02-21 ASSESSMENT — LOCATION ZONE DERM
LOCATION ZONE: AXILLAE
LOCATION ZONE: LEG
LOCATION ZONE: SCALP
LOCATION ZONE: EAR
LOCATION ZONE: NOSE
LOCATION ZONE: FACE
LOCATION ZONE: NECK
LOCATION ZONE: TRUNK

## 2019-02-21 NOTE — PROCEDURE: LIQUID NITROGEN
Post-Care Instructions: I reviewed with the patient in detail post-care instructions. Patient is to wear sunprotection, and avoid picking at any of the treated lesions. Pt may apply Vaseline to crusted or scabbing areas.
Duration Of Freeze Thaw-Cycle (Seconds): 3
Render Post-Care Instructions In Note?: no
Number Of Freeze-Thaw Cycles: 2 freeze-thaw cycles
Consent: The patient's consent was obtained including but not limited to risks of crusting, scabbing, blistering, scarring, darker or lighter pigmentary change, recurrence, incomplete removal and infection.
Detail Level: Detailed

## 2019-03-22 DIAGNOSIS — E11.9 TYPE 2 DIABETES MELLITUS WITH HEMOGLOBIN A1C GOAL OF LESS THAN 7.0% (HCC): Chronic | ICD-10-CM

## 2019-03-22 RX ORDER — DAPAGLIFLOZIN 5 MG/1
1 TABLET, FILM COATED ORAL DAILY
Qty: 90 TAB | Refills: 3 | Status: CANCELLED | OUTPATIENT
Start: 2019-03-22

## 2019-03-22 RX ORDER — DAPAGLIFLOZIN 5 MG/1
1 TABLET, FILM COATED ORAL DAILY
Qty: 90 TAB | Refills: 3 | Status: SHIPPED | OUTPATIENT
Start: 2019-03-22 | End: 2019-07-22

## 2019-04-18 ENCOUNTER — PATIENT OUTREACH (OUTPATIENT)
Dept: HEALTH INFORMATION MANAGEMENT | Facility: OTHER | Age: 64
End: 2019-04-18

## 2019-04-18 NOTE — PROGRESS NOTES
Outcome: Left Message    Please transfer to Patient Outreach Team at [phone] when patient returns call.    WebIZ Checked & Epic Updated:  no    HealthConnect Verified: yes    Attempt # 1

## 2019-04-19 NOTE — PROGRESS NOTES
1. Attempt #:Final    2. HealthConnect Verified: yes    3. Verify PCP: yes    4. Review Care Team: yes    5. WebIZ Checked & Epic Updated: Yes  · WebIZ Recommendations: PREVNAR (PCV13)   · Is patient due for Tdap? NO  · Is patient due for Shingles? NO    6. Reviewed/Updated the following with patient:       •   Communication Preference Obtained? YES       •   Preferred Pharmacy? YES       •   Preferred Lab? YES       •   Family History (document living status of immediate family members and if + hx of cancer, diabetes, hypertension, hyperlipidemia, heart attack, stroke) YES    7. Annual Wellness Visit Scheduling  · Scheduling Status:Scheduled     8. Care Gap Scheduling (Attempt to Schedule EACH Overdue Care Gap!)     Health Maintenance Due   Topic Date Due   • IMM HEP B VACCINE (1 of 3 - Risk 3-dose series) 04/08/1973   • IMM ZOSTER VACCINES (1 of 2) 04/08/2004   • RETINAL SCREENING  02/13/2019/ Requested records from   • IMM PNEUMOCOCCAL 65+ (ADULT) LOW/MEDIUM RISK SERIES (1 of 2 - PCV13) 04/08/2019         9. [employer] Activation: already active    10. [employer] Tamara: no    11. Virtual Visits: no    12. Opt In to Text Messages: no    13. Patient was advised: “This is a free wellness visit. The provider will screen for medical conditions to help you stay healthy. If you have other concerns to address you may be asked to discuss these at a separate visit or there may be an additional fee.”     14. Patient was informed to arrive 15 min prior to their scheduled appointment and bring in their medication bottles.

## 2019-05-06 ENCOUNTER — OFFICE VISIT (OUTPATIENT)
Dept: MEDICAL GROUP | Facility: MEDICAL CENTER | Age: 64
End: 2019-05-06
Payer: MEDICARE

## 2019-05-06 VITALS
SYSTOLIC BLOOD PRESSURE: 102 MMHG | HEART RATE: 89 BPM | TEMPERATURE: 97.9 F | BODY MASS INDEX: 31.5 KG/M2 | RESPIRATION RATE: 16 BRPM | WEIGHT: 196 LBS | DIASTOLIC BLOOD PRESSURE: 60 MMHG | OXYGEN SATURATION: 97 % | HEIGHT: 66 IN

## 2019-05-06 DIAGNOSIS — F10.20 UNCOMPLICATED ALCOHOL DEPENDENCE (HCC): ICD-10-CM

## 2019-05-06 DIAGNOSIS — Z23 NEED FOR VACCINATION: ICD-10-CM

## 2019-05-06 DIAGNOSIS — G47.30 SLEEP APNEA, UNSPECIFIED TYPE: ICD-10-CM

## 2019-05-06 DIAGNOSIS — E55.9 VITAMIN D DEFICIENCY DISEASE: Chronic | ICD-10-CM

## 2019-05-06 DIAGNOSIS — G89.29 CHRONIC BILATERAL LOW BACK PAIN WITHOUT SCIATICA: ICD-10-CM

## 2019-05-06 DIAGNOSIS — M54.12 LEFT CERVICAL RADICULOPATHY: ICD-10-CM

## 2019-05-06 DIAGNOSIS — I65.29 CAROTID ATHEROSCLEROSIS, UNSPECIFIED LATERALITY: ICD-10-CM

## 2019-05-06 DIAGNOSIS — Z00.00 MEDICARE ANNUAL WELLNESS VISIT, INITIAL: ICD-10-CM

## 2019-05-06 DIAGNOSIS — M54.50 CHRONIC BILATERAL LOW BACK PAIN WITHOUT SCIATICA: ICD-10-CM

## 2019-05-06 DIAGNOSIS — F17.210 CIGARETTE NICOTINE DEPENDENCE WITHOUT COMPLICATION: Chronic | ICD-10-CM

## 2019-05-06 DIAGNOSIS — E03.4 HYPOTHYROIDISM DUE TO ACQUIRED ATROPHY OF THYROID: Chronic | ICD-10-CM

## 2019-05-06 DIAGNOSIS — E11.41 TYPE 2 DIABETES MELLITUS WITH DIABETIC MONONEUROPATHY, WITHOUT LONG-TERM CURRENT USE OF INSULIN (HCC): ICD-10-CM

## 2019-05-06 DIAGNOSIS — E78.5 DYSLIPIDEMIA: Chronic | ICD-10-CM

## 2019-05-06 DIAGNOSIS — J30.2 SEASONAL ALLERGIC RHINITIS, UNSPECIFIED TRIGGER: ICD-10-CM

## 2019-05-06 PROBLEM — I10 ESSENTIAL HYPERTENSION: Status: ACTIVE | Noted: 2019-05-06

## 2019-05-06 PROCEDURE — G0009 ADMIN PNEUMOCOCCAL VACCINE: HCPCS | Performed by: NURSE PRACTITIONER

## 2019-05-06 PROCEDURE — 90670 PCV13 VACCINE IM: CPT | Performed by: NURSE PRACTITIONER

## 2019-05-06 PROCEDURE — G0438 PPPS, INITIAL VISIT: HCPCS | Performed by: NURSE PRACTITIONER

## 2019-05-06 ASSESSMENT — ACTIVITIES OF DAILY LIVING (ADL): BATHING_REQUIRES_ASSISTANCE: 0

## 2019-05-06 ASSESSMENT — ENCOUNTER SYMPTOMS: GENERAL WELL-BEING: GOOD

## 2019-05-06 ASSESSMENT — PATIENT HEALTH QUESTIONNAIRE - PHQ9: CLINICAL INTERPRETATION OF PHQ2 SCORE: 0

## 2019-05-06 NOTE — LETTER
[employer] [facility]  Vidhi Bautista A.P.R.N.  [address] [facility] [address]  [place] [address]  Fax: [phone]   Authorization for Release/Disclosure of   Protected Health Information   Name: SURESH ART : 1955 SSN: [id number]   Address: [address] [place] [address] [place] [address] Phone:    [phone] (home)    I authorize the entity listed below to release/disclose the PHI below to:   [employer]/Vidhi Bautista, A.P.R.N. and Vidhi Bautista, A.P.R.N.   Provider or Entity Name:  Dr. Fuller (optometry)   Address   City, [facility], [facility]   Phone:      Fax:     Reason for request: continuity of care   Information to be released:    [  ] LAST COLONOSCOPY,  including any PATH REPORT and follow-up  [  ] LAST FIT/COLOGUARD RESULT [  ] LAST DEXA  [  ] LAST MAMMOGRAM  [  ] LAST PAP  [  ] LAST LABS [x  ] RETINA EXAM REPORT  [  ] IMMUNIZATION RECORDS  [  ] Release all info      [  ] Check here and initial the line next to each item to release ALL health information INCLUDING  _____ Care and treatment for drug and / or alcohol abuse  _____ HIV testing, infection status, or AIDS  _____ Genetic Testing    DATES OF SERVICE OR TIME PERIOD TO BE DISCLOSED: _____________  I understand and acknowledge that:  * This Authorization may be revoked at any time by you in writing, except if your health information has already been used or disclosed.  * Your health information that will be used or disclosed as a result of you signing this authorization could be re-disclosed by the recipient. If this occurs, your re-disclosed health information may no longer be protected by State or Federal laws.  * You may refuse to sign this Authorization. Your refusal will not affect your ability to obtain treatment.  * This Authorization becomes effective upon signing and will  on (date) __________.      If no date is indicated, this Authorization will  one (1) year from the signature date.    Name: Suresh ALDRIDGE  Zac    Signature:   Date:     5/6/2019       PLEASE FAX REQUESTED RECORDS BACK TO: [phone]

## 2019-05-06 NOTE — PROGRESS NOTES
Chief Complaint   Patient presents with   • Annual Exam         HPI:  Kristian Frank is a 64 y.o. here for Medicare Annual Wellness Visit     Patient Active Problem List    Diagnosis Date Noted   • Uncomplicated alcohol dependence (HCC) 05/08/2019   • Carotid atherosclerosis 03/27/2018   • Left cervical radiculopathy 01/29/2018   • Left hand weakness 01/29/2018   • Allergic rhinitis 04/18/2016   • Bilateral low back pain without sciatica 04/18/2016   • Abnormal EKG 02/11/2016   • Cigarette nicotine dependence without complication 02/11/2016   • Obesity (BMI 30.0-34.9) 02/11/2016   • Sleep apnea 02/11/2016   • Tenosynovitis of wrist 02/11/2016   • Type 2 diabetes mellitus with hemoglobin A1c goal of less than 7.0% ([facility]) 10/10/2013   • Vitamin D deficiency disease 12/04/2012   • Dyslipidemia    • Hypothyroid        Current Outpatient Prescriptions   Medication Sig Dispense Refill   • Dapagliflozin Propanediol (FARXIGA) 5 MG Tab Take 1 Tab by mouth every day. 90 Tab 3   • simvastatin (ZOCOR) 40 MG Tab TAKE 1 TABLET BY MOUTH EVERY EVENING. 90 Tab 3   • metformin (GLUCOPHAGE) 1000 MG tablet Take 1 Tab by mouth 2 times a day, with meals. 180 Tab 3   • lisinopril (PRINIVIL) 10 MG Tab Take 1 Tab by mouth every day. 90 Tab 3   • glimepiride (AMARYL) 1 MG tablet Take 1 Tab by mouth every morning. 90 Tab 3   • SYNTHROID 200 MCG Tab Take 1 Tab by mouth every day. On an empty stomach. 90 Tab 3   • vitamin D (CHOLECALCIFEROL) 1000 UNIT Tab Take 3 Tabs by mouth every day. 60 Tab    • Blood Glucose Monitoring Suppl Supplies Misc Test strips for One Touch Ultra Blue meter. Sig: use BID and prn ssx high or low sugar #100 RF x 3 100 Strip 11   • VICTOZA 18 MG/3ML Solution Pen-injector injection INJECT 0.3 ML AS INSTRUCTED EVERY DAY. 3 mL 3   • B-D ULTRAFINE III SHORT PEN 31G X 8 MM Misc USE DAILY 100 Each 2     No current facility-administered medications for this visit.             Current supplements as per medication list.        Allergies: Patient has no known allergies.    Current social contact/activities:spends time with wife, volunteers     He  reports that he has been smoking.  He has been smoking about 0.50 packs per day. He has never used smokeless tobacco. He reports that he drinks about 14.4 oz of alcohol per week . He reports that he uses drugs, including Marijuana and Inhaled.  Ready to quit: No  Counseling given: Yes      DPA/Advanced Directive:  Patient does not have an Advanced Directive.  A packet and workshop information was given on Advanced Directives.    ROS:    Gait: Uses no assistive device  Ostomy: No  Other tubes: No  Amputations: No  Chronic oxygen use: No  Last eye exam: last eye exam was 2/2019  Wears hearing aids: No   : Denies any urinary leakage during the last 6 months    Screening:    Depression Screening    Little interest or pleasure in doing things?  0 - not at all  Feeling down, depressed , or hopeless? 0 - not at all  Patient Health Questionnaire Score: 0     If depressive symptoms identified deferred to follow up visit unless specifically addressed in assessment and plan.    Interpretation of PHQ-9 Total Score   Score Severity   1-4 No Depression   5-9 Mild Depression   10-14 Moderate Depression   15-19 Moderately Severe Depression   20-27 Severe Depression    Screening for Cognitive Impairment    Three Minute Recall (village, kitchen, baby) 3/3    Finn clock face with all 12 numbers and set the hands to show 10 past 10.  Yes    Cognitive concerns identified deferred for follow up unless specifically addressed in assessment and plan.    Fall Risk Assessment    Has the patient had two or more falls in the last year or any fall with injury in the last year?  No    Safety Assessment    Throw rugs on floor.  Yes  Handrails on all stairs.  No  Good lighting in all hallways.  Yes  Difficulty hearing.  No  Patient counseled about all safety risks that were identified.    Functional Assessment  ADLs    Are there any barriers preventing you from cooking for yourself or meeting nutritional needs?  No.    Are there any barriers preventing you from driving safely or obtaining transportation?  No.    Are there any barriers preventing you from using a telephone or calling for help?  No.    Are there any barriers preventing you from shopping?  No.    Are there any barriers preventing you from taking care of your own finances?  No.    Are there any barriers preventing you from managing your medications?  No.    Are there any barriers preventing you from showering, bathing or dressing yourself?  No.    Are you currently engaging in any exercise or physical activity?  Yes.     What is your perception of your health?  Good.      Health Maintenance Summary                IMM HEP B VACCINE Overdue 4/8/1973     RETINAL SCREENING Overdue 2/13/2019      Done 2/13/2018 REFERRAL FOR RETINAL SCREENING EXAM     Patient has more history with this topic...    IMM ZOSTER VACCINES Overdue 4/10/2019      Done 2/13/2019 Imm Admin: Recombinant Zoster Vaccine (RZV) (Shingrix)    PSA Screening Next Due 6/26/2019      Done 6/26/2018 PROSTATE SPECIFIC AG SCREENING    A1C SCREENING Next Due 7/15/2019      Done 1/15/2019 HEMOGLOBIN A1C      Patient has more history with this topic...    FASTING LIPID PROFILE Next Due 1/15/2020      Done 1/15/2019 LIPID PROFILE      Patient has more history with this topic...    URINE ACR / MICROALBUMIN Next Due 1/15/2020      Done 1/15/2019 MICROALBUMIN CREAT RATIO URINE     Patient has more history with this topic...    SERUM CREATININE Next Due 1/15/2020      Done 1/15/2019 COMP METABOLIC PANEL      Patient has more history with this topic...    DIABETES MONOFILAMENT / LE EXAM Next Due 1/22/2020      Done 1/22/2019 AMB DIABETIC MONOFILAMENT LOWER EXTREMITY EXAM     Patient has more history with this topic...    COLONOSCOPY Next Due 8/5/2020      Done 8/5/2015 AMB REFERRAL TO GI FOR COLONOSCOPY    IMM  "PNEUMOCOCCAL 65+ (ADULT) LOW/MEDIUM RISK SERIES Next Due 10/17/2022      Done 5/6/2019 Imm Admin: Pneumococcal Conjugate Vaccine (Prevnar/PCV-13)     Patient has more history with this topic...    IMM DTaP/Tdap/Td Vaccine Next Due 11/8/2023      Done 11/8/2013 Imm Admin: Tdap Vaccine          Patient Care Team:  LEON Hernandez as PCP - General (Nurse Practitioner)  Tello Fuller O.D. as Consulting Physician (Optometry)  Dave Oliva M.D. as Consulting Physician (Endocrinology)        Social History   Substance Use Topics   • Smoking status: Current Every Day Smoker     Packs/day: 0.50   • Smokeless tobacco: Never Used   • Alcohol use 14.4 oz/week     24 Cans of beer per week     Family History   Problem Relation Age of Onset   • Diabetes Mother    • Hypertension Mother    • Hyperlipidemia Mother    • Heart Disease Father    • Hypertension Father    • Hyperlipidemia Father    • Heart Attack Father    • Diabetes Sister    • Hyperlipidemia Sister    • Diabetes Brother         Type 2   • Hypertension Brother    • Hyperlipidemia Brother    • Hypertension Sister    • Diabetes Sister         type 2   • Cancer Sister         Lung cancer   • Lung Disease Sister      He  has a past medical history of Back pain; DIABETES MELLITUS; Diverticulitis; GOUT; Hyperlipidemia; Hypothyroid; Pneumonia; Post-nasal drip; and Rhinitis.   Past Surgical History:   Procedure Laterality Date   • APPENDECTOMY     • COLON RESECTION         Exam:   /60   Pulse 89   Temp 36.6 °C (97.9 °F)   Resp 16   Ht 1.676 m (5' 6\")   Wt 88.9 kg (196 lb)   SpO2 97%  Body mass index is 31.64 kg/m².    Hearing good.    Dentition good  Alert, oriented in no acute distress.  Eye contact is good, speech goal directed, affect calm    Assessment and Plan. The following treatment and monitoring plan is recommended:    1. Medicare annual wellness visit, initial   Have reviewed medication recommendations, recommended immunizations and " screenings with patient. Initial Annual Wellness Visit - Includes PPPS ()   2. Type 2 diabetes mellitus with diabetic mononeuropathy, without long-term current use of insulin (HCC)   Stable.  Follows up with endocrinology for this.  Most recent A1c was 7.3%.  Continue current regimen at this time. Initial Annual Wellness Visit - Includes PPPS ()   3. Dyslipidemia   Chronic.  Triglycerides not well controlled.  He does consume quite a bit of beer.  Most recent lipid panel January.  Have reviewed working on dietary modifications to help reduce his triglycerides.  Tolerating the simvastatin without any myalgias. Initial Annual Wellness Visit - Includes PPPS ()   4. Hypothyroidism due to acquired atrophy of thyroid   Stable at this time according to most recent labs.  Feels euthyroid.  TSH 1.710.  Continue current regimen. Initial Annual Wellness Visit - Includes PPPS ()   5. Vitamin D deficiency disease   Stable according to most recent labs.  Continue with over-the-counter supplementation. Initial Annual Wellness Visit - Includes PPPS ()   6. Carotid atherosclerosis, unspecified laterality   Chronic.  Found on Doppler April 2018.  Continue with routine monitoring.  Encourage tobacco cessation and continuance of his statin medication.  Patient declines quitting smoking at this time.    7. Seasonal allergic rhinitis, unspecified trigger   Chronic.  Controlled with use over-the-counter allergy tablets or nasal spray. Initial Annual Wellness Visit - Includes PPPS ()   8. Sleep apnea, unspecified type   Chronic.  Does not tolerate wearing a CPAP.   Initial Annual Wellness Visit - Includes PPPS ()   9. Uncomplicated alcohol dependence (HCC)   Chronic.  Patient states that this is controlled.  He has approximately 24 beers a week.  He is a home [employer].  States that this does not interfere with his daily functions.   Initial Annual Wellness Visit - Includes PPPS ()   10. Left cervical  radiculopathy   This is chronic problem.  He has had imaging completed last year which shows disc space narrowing in his cervical vertebrae.  He manages this with nonpharmacological methods. Currently is not following up with any specialist for this..    11. Chronic bilateral low back pain without sciatica   This is a chronic problem.  He is able to control this with postural awareness and stretching.  No loss of bowel or bladder, footdrop or saddle anesthesia.    12. Cigarette nicotine dependence without complication   Chronic.  Have discussed working on tobacco cessation. Initial Annual Wellness Visit - Includes PPPS ()   13. Need for vaccination  Pneumococcal Conjugate Vaccine 13-Valent   Patient states that he did get his shingles vaccine in February with the [employer].  It is not pulling in from weight by the.  He has not obtained the second dose at this time.  Have encouraged him to follow-up with the pharmacy for this. Initial Annual Wellness Visit - Includes PPPS ()       Services suggested: No services needed at this time  Health Care Screening: Age-appropriate preventive services recommended by USPTF and ACIP covered by Medicare were discussed today. Services ordered if indicated and agreed upon by the patient.  Referrals offered: Community-based lifestyle interventions to reduce health risks and promote self-management and wellness, fall prevention, nutrition, physical activity, tobacco-use cessation, weight loss, and mental health services as per orders if indicated.    Discussion today about general wellness and lifestyle habits:    · Prevent falls and reduce trip hazards; Cautioned about securing or removing rugs.  · Have a working fire alarm and carbon monoxide detector;   · Engage in regular physical activity and social activities     Follow-up: Return in about 4 months (around 9/6/2019) for Diabetes.

## 2019-05-08 PROBLEM — I10 ESSENTIAL HYPERTENSION: Status: RESOLVED | Noted: 2019-05-06 | Resolved: 2019-05-08

## 2019-05-08 PROBLEM — F10.20 UNCOMPLICATED ALCOHOL DEPENDENCE (HCC): Status: ACTIVE | Noted: 2019-05-08

## 2019-05-10 ENCOUNTER — HOSPITAL ENCOUNTER (OUTPATIENT)
Dept: LAB | Facility: MEDICAL CENTER | Age: 64
End: 2019-05-10
Attending: INTERNAL MEDICINE
Payer: MEDICARE

## 2019-05-10 LAB
25(OH)D3 SERPL-MCNC: 41 NG/ML (ref 30–100)
ALBUMIN SERPL BCP-MCNC: 4.3 G/DL (ref 3.2–4.9)
ALBUMIN/GLOB SERPL: 1.7 G/DL
ALP SERPL-CCNC: 49 U/L (ref 30–99)
ALT SERPL-CCNC: 22 U/L (ref 2–50)
ANION GAP SERPL CALC-SCNC: 11 MMOL/L (ref 0–11.9)
AST SERPL-CCNC: 16 U/L (ref 12–45)
BILIRUB SERPL-MCNC: 0.6 MG/DL (ref 0.1–1.5)
BUN SERPL-MCNC: 16 MG/DL (ref 8–22)
CALCIUM SERPL-MCNC: 9.3 MG/DL (ref 8.5–10.5)
CHLORIDE SERPL-SCNC: 104 MMOL/L (ref 96–112)
CHOLEST SERPL-MCNC: 177 MG/DL (ref 100–199)
CO2 SERPL-SCNC: 23 MMOL/L (ref 20–33)
CREAT SERPL-MCNC: 1.05 MG/DL (ref 0.5–1.4)
CREAT UR-MCNC: 105.1 MG/DL
EST. AVERAGE GLUCOSE BLD GHB EST-MCNC: 166 MG/DL
FASTING STATUS PATIENT QL REPORTED: NORMAL
GLOBULIN SER CALC-MCNC: 2.5 G/DL (ref 1.9–3.5)
GLUCOSE SERPL-MCNC: 151 MG/DL (ref 65–99)
HBA1C MFR BLD: 7.4 % (ref 0–5.6)
HDLC SERPL-MCNC: 37 MG/DL
LDLC SERPL CALC-MCNC: 68 MG/DL
MICROALBUMIN UR-MCNC: <0.7 MG/DL
MICROALBUMIN/CREAT UR: NORMAL MG/G (ref 0–30)
POTASSIUM SERPL-SCNC: 4.1 MMOL/L (ref 3.6–5.5)
PROT SERPL-MCNC: 6.8 G/DL (ref 6–8.2)
PSA SERPL-MCNC: 2.17 NG/ML (ref 0–4)
SODIUM SERPL-SCNC: 138 MMOL/L (ref 135–145)
TRIGL SERPL-MCNC: 361 MG/DL (ref 0–149)
TSH SERPL DL<=0.005 MIU/L-ACNC: 1.3 UIU/ML (ref 0.38–5.33)

## 2019-05-10 PROCEDURE — 82306 VITAMIN D 25 HYDROXY: CPT

## 2019-05-10 PROCEDURE — 84443 ASSAY THYROID STIM HORMONE: CPT

## 2019-05-10 PROCEDURE — 84439 ASSAY OF FREE THYROXINE: CPT

## 2019-05-10 PROCEDURE — 82570 ASSAY OF URINE CREATININE: CPT

## 2019-05-10 PROCEDURE — 80061 LIPID PANEL: CPT

## 2019-05-10 PROCEDURE — 84153 ASSAY OF PSA TOTAL: CPT

## 2019-05-10 PROCEDURE — 83036 HEMOGLOBIN GLYCOSYLATED A1C: CPT

## 2019-05-10 PROCEDURE — 82043 UR ALBUMIN QUANTITATIVE: CPT

## 2019-05-10 PROCEDURE — 36415 COLL VENOUS BLD VENIPUNCTURE: CPT

## 2019-05-10 PROCEDURE — 80053 COMPREHEN METABOLIC PANEL: CPT

## 2019-05-13 LAB — T4 FREE SERPL DIALY-MCNC: 2.4 NG/DL (ref 1.1–2.4)

## 2019-05-15 DIAGNOSIS — E11.9 TYPE 2 DIABETES MELLITUS WITH HEMOGLOBIN A1C GOAL OF LESS THAN 7.0% (HCC): Chronic | ICD-10-CM

## 2019-05-15 DIAGNOSIS — E03.4 HYPOTHYROIDISM DUE TO ACQUIRED ATROPHY OF THYROID: Chronic | ICD-10-CM

## 2019-06-24 NOTE — TELEPHONE ENCOUNTER
Pharmacy would like 10 day supply?    Was the patient seen in the last year in this department? Yes    Does patient have an active prescription for medications requested? No     Received Request Via: Pharmacy

## 2019-07-03 DIAGNOSIS — E11.9 TYPE 2 DIABETES MELLITUS WITH HEMOGLOBIN A1C GOAL OF LESS THAN 7.0% (HCC): Chronic | ICD-10-CM

## 2019-07-03 RX ORDER — GLIMEPIRIDE 1 MG/1
1 TABLET ORAL EVERY MORNING
Qty: 100 TAB | Refills: 3 | Status: SHIPPED | OUTPATIENT
Start: 2019-07-03 | End: 2019-07-22

## 2019-07-19 ENCOUNTER — TELEPHONE (OUTPATIENT)
Dept: MEDICAL GROUP | Facility: MEDICAL CENTER | Age: 64
End: 2019-07-19

## 2019-07-19 RX ORDER — LANCETS 30 GAUGE
EACH MISCELLANEOUS
Qty: 100 EACH | Refills: 4 | Status: SHIPPED | OUTPATIENT
Start: 2019-07-19

## 2019-07-19 NOTE — TELEPHONE ENCOUNTER
Patients insurance does not cover the one touch. Pharmacy requesting a new script for the freestyle meter,lancets and strips.

## 2019-07-22 ENCOUNTER — OFFICE VISIT (OUTPATIENT)
Dept: ENDOCRINOLOGY | Facility: MEDICAL CENTER | Age: 64
End: 2019-07-22
Payer: MEDICARE

## 2019-07-22 VITALS
OXYGEN SATURATION: 98 % | WEIGHT: 191 LBS | SYSTOLIC BLOOD PRESSURE: 120 MMHG | BODY MASS INDEX: 30.7 KG/M2 | HEIGHT: 66 IN | HEART RATE: 92 BPM | DIASTOLIC BLOOD PRESSURE: 74 MMHG

## 2019-07-22 DIAGNOSIS — E11.9 TYPE 2 DIABETES MELLITUS WITH HEMOGLOBIN A1C GOAL OF LESS THAN 7.0% (HCC): Chronic | ICD-10-CM

## 2019-07-22 PROCEDURE — 99204 OFFICE O/P NEW MOD 45 MIN: CPT | Performed by: PHYSICIAN ASSISTANT

## 2019-07-22 RX ORDER — PIOGLITAZONEHYDROCHLORIDE 30 MG/1
30 TABLET ORAL DAILY
Qty: 30 TAB | Refills: 11 | Status: SHIPPED | OUTPATIENT
Start: 2019-07-22 | End: 2020-07-24

## 2019-07-22 NOTE — PATIENT INSTRUCTIONS
STOP:  1.   Victoza 1.8 once a day  3.   Farxiga 5  4.   Metformin 1000 twice a day  2.   Glimepiride  5.   Lisinopril     Start:  1.  Ozempic 0.25 once a week then INCREASE to 0.5 week then increase to 1.0. Ozempic can cause nausea and upset stomach feelings but this generally only lasts a day or two.  If this persists longer than 2 weeks and is not tolerable please discontinue the medication and let us know of the issue.  2.  Synjardy 12.5/1000 one in the AM one int he PM  3.  Actos 30mg one a day get at pharmacy

## 2019-07-22 NOTE — PROGRESS NOTES
New Patient Consult Note  Referred by: LEON Hernandez    Reason for consult: Diabetes Management Type 2    HPI:  Kristian Frank is a 64 y.o. old patient who is seeing us today for diabetes care.  This is a pleasant patient with diabetes and I appreciate the opportunity to participate in the care of this patient.  This is a new patient with me today.    Labs of 5/10/2019 HbA1c is 7.4 GFR >60  Labs of 1/15/19 HbA1c is 7.3, GFR 60       BG Diary:7/22/2019  In the AM:  114, 165, 152, 141, 143, 180, 190, 142, 138    Has been Diabetic since T2 10 + years  Has a Glucagon pen at home: no    1. Type 2 diabetes mellitus with hemoglobin A1c goal of less than 7.0% ([facility])  This is a new patient with me on 7/22/2019  They are on:  1.   Victoza 1.8 once a day  2.   Glimepiride  3.   Farxiga 5  4.   Metformin 1000 twice a day  5.   Lisinopril    STOP:  1.   Victoza 1.8 once a day  3.   Farxiga 5  4.   Metformin 1000 twice a day  2.   Glimepiride  5.   Lisinopril     Start:  1.  Ozempic 0.25 once a week then INCREASE to 0.5 week then increase to 1.0. Ozempic can cause nausea and upset stomach feelings but this generally only lasts a day or two.  If this persists longer than 2 weeks and is not tolerable please discontinue the medication and let us know of the issue.  2.  Synjardy 12.5/1000 one in the AM one int he PM  3.  Actos 30mg one a day get at pharmacy        ROS:   Constitutional: No change in weight , No fatigue, No night sweats.  HEENT: No Headache.  Eyes:  No blurred vision, No visual changes.  Cardiac: No chest pain, No palpitations.  Resp: No shortness of breath, No cough,   Gastro: No nausea or vomiting, No diarrhea.  Neuro: Denies numbness or tinging in bilateral feet or hands, and no loss of sensation.  Endo: No heat or cold intolerance.  : No polyuria, No polydipsia, No chronic UTI's.  Lower extremities: No lower leg edema bilateral.  All other systems were reviewed and were negative.    Past  Medical History:  Patient Active Problem List    Diagnosis Date Noted   • Uncomplicated alcohol dependence ([facility]) 05/08/2019   • Carotid atherosclerosis 03/27/2018   • Left cervical radiculopathy 01/29/2018   • Left hand weakness 01/29/2018   • Allergic rhinitis 04/18/2016   • Bilateral low back pain without sciatica 04/18/2016   • Abnormal EKG 02/11/2016   • Cigarette nicotine dependence without complication 02/11/2016   • Obesity (BMI 30.0-34.9) 02/11/2016   • Sleep apnea 02/11/2016   • Tenosynovitis of wrist 02/11/2016   • Type 2 diabetes mellitus with hemoglobin A1c goal of less than 7.0% ([facility]) 10/10/2013   • Vitamin D deficiency disease 12/04/2012   • Dyslipidemia    • Hypothyroid        Past Surgical History:  Past Surgical History:   Procedure Laterality Date   • APPENDECTOMY     • COLON RESECTION         Allergies:  Patient has no known allergies.    Social History:  Social History     Social History   • Marital status:      Spouse name: N/A   • Number of children: N/A   • Years of education: N/A     Occupational History   • Not on file.     Social History Main Topics   • Smoking status: Current Every Day Smoker     Packs/day: 0.50   • Smokeless tobacco: Never Used   • Alcohol use 14.4 oz/week     24 Cans of beer per week   • Drug use: Yes     Types: Marijuana, Inhaled   • Sexual activity: Yes     Partners: Female     Other Topics Concern   • Not on file     Social History Narrative   • No narrative on file       Family History:  Family History   Problem Relation Age of Onset   • Diabetes Mother    • Hypertension Mother    • Hyperlipidemia Mother    • Heart Disease Father    • Hypertension Father    • Hyperlipidemia Father    • Heart Attack Father    • Diabetes Sister    • Hyperlipidemia Sister    • Diabetes Brother         Type 2   • Hypertension Brother    • Hyperlipidemia Brother    • Hypertension Sister    • Diabetes Sister         type 2   • Cancer Sister         Lung cancer   • Lung Disease  "Sister        Medications:    Current Outpatient Prescriptions:   •  Semaglutide (OZEMPIC) 1 MG/DOSE Solution Pen-injector, Inject 1 mg as instructed every 7 days., Disp: 2 PEN, Rfl: 11  •  Empagliflozin-metFORMIN HCl ER 12.5-1000 MG TABLET SR 24 HR, Take 1 Tab by mouth 2 times a day., Disp: 60 Tab, Rfl: 11  •  pioglitazone (ACTOS) 30 MG Tab, Take 1 Tab by mouth every day., Disp: 30 Tab, Rfl: 11  •  ONE TOUCH ULTRA TEST strip, USE ONE NEW TEST STRIP TO TEST BLOOD SUGAR TWO TIMES A DAY AND AS NEEDED IF SYMPTOMS FOR HIGH OR LOW SUGAR, Disp: 100 Strip, Rfl: 10  •  Blood Glucose Test Strips, Use one Freestyle strip to test blood sugar twice daily., Disp: 100 Strip, Rfl: 4  •  Lancets, Use one Freestyle lancet to test blood sugar twice daily., Disp: 100 Each, Rfl: 4  •  Blood Glucose Meter Kit, Test blood sugar as recommended by provider. Freestyle blood glucose monitoring kit., Disp: 1 Kit, Rfl: 0  •  simvastatin (ZOCOR) 40 MG Tab, TAKE 1 TABLET BY MOUTH EVERY EVENING., Disp: 90 Tab, Rfl: 3  •  SYNTHROID 200 MCG Tab, Take 1 Tab by mouth every day. On an empty stomach., Disp: 90 Tab, Rfl: 3  •  vitamin D (CHOLECALCIFEROL) 1000 UNIT Tab, Take 3 Tabs by mouth every day., Disp: 60 Tab, Rfl:   •  B-D ULTRAFINE III SHORT PEN 31G X 8 MM Misc, USE DAILY, Disp: 100 Each, Rfl: 2      Physical Examination:   Vital signs: /74 (BP Location: Left arm, Patient Position: Sitting)   Pulse 92   Ht 1.676 m (5' 6\")   Wt 86.6 kg (191 lb)   SpO2 98%   BMI 30.83 kg/m²   General: No distress, cooperative, well dressed and well nourished.   Eyes: No scleral icterus or discharge, No hyposphagma  ENMT: Normal on external inspection of nose, lips, No nasal drainage   Neck: No abnormal masses on inspection  Resp: Normal effort, Bilateral clear to auscultation, No wheezing, No rales  CVS: Regular rate and rhythm, S1 S2 normal, No murmur. No gallop  Extremities: No edema bilateral extremities  Neuro: Alert and oriented  Skin: No rash, No " Ulcers  Psych: Normal mood and affect  Foot exam: normal sensation to monofilament testing, normal pulses, no ulcers.  Normal Vibration quantitative sensation test.    Assessment and Plan:    1. Type 2 diabetes mellitus with hemoglobin A1c goal of less than 7.0% ([facility])    STOP:  1.   Victoza 1.8 once a day  3.   Farxiga 5  4.   Metformin 1000 twice a day  2.   Glimepiride  5.   Lisinopril     Start:  1.  Ozempic 0.25 once a week then INCREASE to 0.5 week then increase to 1.0. Ozempic can cause nausea and upset stomach feelings but this generally only lasts a day or two.  If this persists longer than 2 weeks and is not tolerable please discontinue the medication and let us know of the issue.  2.  Synjardy 12.5/1000 one in the AM one int he PM  3.  Actos 30mg one a day get at pharmacy      Return in about 3 months (around 10/22/2019).       This patient during there office visit was started on new medication ozempic and synjardy actos.  Side effects of new medications were discussed with the patient today in the office. The patient was supplied paperwork on this new medication.    Thank you kindly for allowing me to participate in the diabetes care plan for this patient.    Jason Holbrook PA-C, BC-ADM  Board Certified - Advanced Diabetes Management  07/22/19    CC:   LEON Hernandez

## 2019-08-05 DIAGNOSIS — E78.5 DYSLIPIDEMIA: Chronic | ICD-10-CM

## 2019-08-05 RX ORDER — SIMVASTATIN 40 MG
TABLET ORAL
Qty: 90 TAB | Refills: 3 | Status: SHIPPED | OUTPATIENT
Start: 2019-08-05 | End: 2020-06-01

## 2019-08-05 RX ORDER — LEVOTHYROXINE SODIUM 200 MCG
200 TABLET ORAL
Qty: 90 TAB | Refills: 3 | Status: SHIPPED | OUTPATIENT
Start: 2019-08-05 | End: 2020-03-17 | Stop reason: SDUPTHER

## 2019-08-08 ENCOUNTER — TELEPHONE (OUTPATIENT)
Dept: ENDOCRINOLOGY | Facility: MEDICAL CENTER | Age: 64
End: 2019-08-08

## 2019-08-08 NOTE — TELEPHONE ENCOUNTER
DOCUMENTATION OF PAR STATUS:    1. Name of Medication & Dose: Synjardy XR 10-1000mg     2. Name of Prescription Coverage Company & phone #: [employer] Ph: [phone]    3. Date Prior Auth Submitted: 8/8/19    4. What information was given to obtain insurance decision? Chart notes and labs    5. Prior Auth Status? Pending    6. Action Taken: Pharmacy Notified: yes

## 2019-08-15 NOTE — TELEPHONE ENCOUNTER
FINAL PRIOR AUTHORIZATION STATUS:    1.  Name of Medication & Dose: Synjardy XR 10-1000mg     2. Prior Auth Status (if approved--length of approval):  Approved 8/9/19-8/8/20.    3. Action Taken: Pharmacy Notified: yes    Documentation was scanned into media and attached to this telephone encounter.

## 2019-09-03 ENCOUNTER — OFFICE VISIT (OUTPATIENT)
Dept: ENDOCRINOLOGY | Facility: MEDICAL CENTER | Age: 64
End: 2019-09-03
Payer: MEDICARE

## 2019-09-03 VITALS
BODY MASS INDEX: 29.41 KG/M2 | HEART RATE: 88 BPM | HEIGHT: 66 IN | DIASTOLIC BLOOD PRESSURE: 60 MMHG | OXYGEN SATURATION: 97 % | SYSTOLIC BLOOD PRESSURE: 110 MMHG | WEIGHT: 183 LBS

## 2019-09-03 DIAGNOSIS — E11.9 TYPE 2 DIABETES MELLITUS WITH HEMOGLOBIN A1C GOAL OF LESS THAN 7.0% (HCC): Chronic | ICD-10-CM

## 2019-09-03 DIAGNOSIS — E03.8 OTHER SPECIFIED HYPOTHYROIDISM: Chronic | ICD-10-CM

## 2019-09-03 LAB
HBA1C MFR BLD: 6.8 % (ref 0–5.6)
INT CON NEG: NEGATIVE
INT CON POS: POSITIVE

## 2019-09-03 PROCEDURE — 83036 HEMOGLOBIN GLYCOSYLATED A1C: CPT | Performed by: PHYSICIAN ASSISTANT

## 2019-09-03 PROCEDURE — 99214 OFFICE O/P EST MOD 30 MIN: CPT | Performed by: PHYSICIAN ASSISTANT

## 2019-09-03 NOTE — PROGRESS NOTES
Return to office Patient Consult Note  Referred by: LEON Hernandez    Reason for consult: Diabetes Management Type 2    HPI:  Kristian Frank is a 64 y.o. old patient who is seeing us today for diabetes care.  This is a pleasant patient with diabetes and I appreciate the opportunity to participate in the care of this patient.    Labs of 9/3/2019 HbA1c is 6.8  Labs of 5/10/2019 HbA1c is 7.4 GFR >60  Labs of 1/15/19 HbA1c is 7.3, GFR 60    BG Diary:9/3/2019  In the AM: 168, 149, 139, 126, 135  Before Bed:132, 127, 114, 142, 138      1. Type 2 diabetes mellitus with hemoglobin A1c goal of less than 7.0% ([facility])  This is a new patient with me on 7/22/2019  They are on:  1.   Victoza 1.8 once a day  2.   Glimepiride  3.   Farxiga 5  4.   Metformin 1000 twice a day  5.   Lisinopril     STOP:  1.   Victoza 1.8 once a day  3.   Farxiga 5  4.   Metformin 1000 twice a day  2.   Glimepiride  5.   Lisinopril      Now on:  1.  Ozempic 1.0 once a week   2.  Synjardy 12.5/1000 one in the AM one int he PM  3.  Actos 30mg one a day get at pharmacy       2. Other specified hypothyroidism  This is stable and TSH 1.3        ROS:   Constitutional: No night sweats.  Eyes:  No visual changes.  Cardiac: No chest pain, No palpitations or racing heart rate.  Resp: No shortness of breath, No cough,   Gi: No Diarrhea      All other systems were reviewed and were/are negative.      Past Medical History:  Patient Active Problem List    Diagnosis Date Noted   • Uncomplicated alcohol dependence ([facility]) 05/08/2019   • Carotid atherosclerosis 03/27/2018   • Left cervical radiculopathy 01/29/2018   • Left hand weakness 01/29/2018   • Allergic rhinitis 04/18/2016   • Bilateral low back pain without sciatica 04/18/2016   • Abnormal EKG 02/11/2016   • Cigarette nicotine dependence without complication 02/11/2016   • Obesity (BMI 30.0-34.9) 02/11/2016   • Sleep apnea 02/11/2016   • Tenosynovitis of wrist 02/11/2016   • Type 2 diabetes  mellitus with hemoglobin A1c goal of less than 7.0% ([facility]) 10/10/2013   • Vitamin D deficiency disease 12/04/2012   • Dyslipidemia    • Hypothyroid        Past Surgical History:  Past Surgical History:   Procedure Laterality Date   • APPENDECTOMY     • COLON RESECTION         Allergies:  Patient has no known allergies.    Social History:  Social History     Socioeconomic History   • Marital status:      Spouse name: Not on file   • Number of children: Not on file   • Years of education: Not on file   • Highest education level: Not on file   Occupational History   • Not on file   Social Needs   • Financial resource strain: Not on file   • Food insecurity:     Worry: Not on file     Inability: Not on file   • Transportation needs:     Medical: Not on file     Non-medical: Not on file   Tobacco Use   • Smoking status: Current Every Day Smoker     Packs/day: 0.50   • Smokeless tobacco: Never Used   Substance and Sexual Activity   • Alcohol use: Yes     Alcohol/week: 14.4 oz     Types: 24 Cans of beer per week   • Drug use: Yes     Types: Marijuana, Inhaled   • Sexual activity: Yes     Partners: Female   Lifestyle   • Physical activity:     Days per week: Not on file     Minutes per session: Not on file   • Stress: Not on file   Relationships   • Social connections:     Talks on phone: Not on file     Gets together: Not on file     Attends [employer] service: Not on file     Active member of club or organization: Not on file     Attends meetings of clubs or organizations: Not on file     Relationship status: Not on file   • Intimate partner violence:     Fear of current or ex partner: Not on file     Emotionally abused: Not on file     Physically abused: Not on file     Forced sexual activity: Not on file   Other Topics Concern   • Not on file   Social History Narrative   • Not on file       Family History:  Family History   Problem Relation Age of Onset   • Diabetes Mother    • Hypertension Mother    •  "Hyperlipidemia Mother    • Heart Disease Father    • Hypertension Father    • Hyperlipidemia Father    • Heart Attack Father    • Diabetes Sister    • Hyperlipidemia Sister    • Diabetes Brother         Type 2   • Hypertension Brother    • Hyperlipidemia Brother    • Hypertension Sister    • Diabetes Sister         type 2   • Cancer Sister         Lung cancer   • Lung Disease Sister        Medications:    Current Outpatient Medications:   •  simvastatin (ZOCOR) 40 MG Tab, TAKE 1 TABLET BY MOUTH EVERY EVENING., Disp: 90 Tab, Rfl: 3  •  SYNTHROID 200 MCG Tab, Take 1 Tab by mouth every day. On an empty stomach., Disp: 90 Tab, Rfl: 3  •  Semaglutide (OZEMPIC) 1 MG/DOSE Solution Pen-injector, Inject 1 mg as instructed every 7 days., Disp: 2 PEN, Rfl: 11  •  Empagliflozin-metFORMIN HCl ER 12.5-1000 MG TABLET SR 24 HR, Take 1 Tab by mouth 2 times a day., Disp: 60 Tab, Rfl: 11  •  pioglitazone (ACTOS) 30 MG Tab, Take 1 Tab by mouth every day., Disp: 30 Tab, Rfl: 11  •  ONE TOUCH ULTRA TEST strip, USE ONE NEW TEST STRIP TO TEST BLOOD SUGAR TWO TIMES A DAY AND AS NEEDED IF SYMPTOMS FOR HIGH OR LOW SUGAR, Disp: 100 Strip, Rfl: 10  •  Blood Glucose Test Strips, Use one Freestyle strip to test blood sugar twice daily., Disp: 100 Strip, Rfl: 4  •  Lancets, Use one Freestyle lancet to test blood sugar twice daily., Disp: 100 Each, Rfl: 4  •  Blood Glucose Meter Kit, Test blood sugar as recommended by provider. Freestyle blood glucose monitoring kit., Disp: 1 Kit, Rfl: 0  •  vitamin D (CHOLECALCIFEROL) 1000 UNIT Tab, Take 3 Tabs by mouth every day., Disp: 60 Tab, Rfl:   •  B-D ULTRAFINE III SHORT PEN 31G X 8 MM Misc, USE DAILY, Disp: 100 Each, Rfl: 2        Physical Examination:   Vital signs: /60 (BP Location: Left arm, Patient Position: Sitting)   Pulse 88   Ht 1.676 m (5' 6\")   Wt 83 kg (183 lb)   SpO2 97%   BMI 29.54 kg/m²   General: No distress, cooperative, well dressed and well nourished.   Eyes: No scleral " icterus or discharge, No hyposphagma  ENMT: Normal on external inspection of nose, lips, No nasal drainage   Neck: No abnormal masses on inspection  Resp: Normal effort, Bilateral clear to auscultation, No wheezing, No rales  CVS: Regular rate and rhythm, S1 S2 normal, No murmur. No gallop  Extremities: No edema bilateral extremities  Neuro: Alert and oriented  Skin: No rash, No Ulcers  Psych: Normal mood and affect      Assessment and Plan:    1. Type 2 diabetes mellitus with hemoglobin A1c goal of less than 7.0% ([facility])      Now on:  1.  Ozempic 1.0 once a week   2.  Synjardy 12.5/1000 one in the AM one int he PM  3.  Actos 30mg one a day get at pharmacy    2. Other specified hypothyroidism  This is stable today and no new changes are needed or required in today's visit        Return in about 6 months (around 3/3/2020).      Thank you kindly for allowing me to participate in the diabetes care plan for this patient.    Jason Holbrook PA-C, BC-ADM  Board Certified - Advanced Diabetes Management  09/03/19    CC:   LEON Hernandez

## 2019-09-05 ENCOUNTER — OFFICE VISIT (OUTPATIENT)
Dept: MEDICAL GROUP | Facility: MEDICAL CENTER | Age: 64
End: 2019-09-05
Payer: MEDICARE

## 2019-09-05 VITALS
OXYGEN SATURATION: 100 % | SYSTOLIC BLOOD PRESSURE: 112 MMHG | DIASTOLIC BLOOD PRESSURE: 70 MMHG | HEIGHT: 66 IN | TEMPERATURE: 97.9 F | BODY MASS INDEX: 29.73 KG/M2 | WEIGHT: 185 LBS | HEART RATE: 91 BPM | RESPIRATION RATE: 16 BRPM

## 2019-09-05 DIAGNOSIS — E03.8 OTHER SPECIFIED HYPOTHYROIDISM: Chronic | ICD-10-CM

## 2019-09-05 DIAGNOSIS — E78.5 DYSLIPIDEMIA: Chronic | ICD-10-CM

## 2019-09-05 DIAGNOSIS — E11.9 TYPE 2 DIABETES MELLITUS WITH HEMOGLOBIN A1C GOAL OF LESS THAN 7.0% (HCC): Chronic | ICD-10-CM

## 2019-09-05 PROCEDURE — 99214 OFFICE O/P EST MOD 30 MIN: CPT | Performed by: NURSE PRACTITIONER

## 2019-09-05 RX ORDER — BLOOD-GLUCOSE METER
1 KIT MISCELLANEOUS
COMMUNITY
Start: 2019-07-26 | End: 2021-06-14

## 2019-09-05 RX ORDER — EMPAGLIFLOZIN AND METFORMIN HYDROCHLORIDE 12.5; 1 MG/1; MG/1
TABLET ORAL
COMMUNITY
Start: 2019-07-23 | End: 2020-02-25

## 2019-09-05 NOTE — PROGRESS NOTES
cc: Follow-up diabetes      Subjective:     HPI:     Kristian Frank is a 64 y.o. male here to discuss the evaluation and management of:      1. Type 2 diabetes mellitus with hemoglobin A1c goal of less than 7.0% ([facility])  Patient states that he is now currently taking Synjardy, Ozempic and Actos.  This is recently started by his endocrinologist.  He is happy that he is reduce some of his medications.  His fasting blood sugar this morning is 125.  Denies any side effects at this time from the medications.  Last A1c was in September at 6.8%.    2. Other specified hypothyroidism  Patient has been taking the Synthroid 200 mcg daily on empty stomach.  Last TSH was 1.300 on May 2019.    3. Dyslipidemia  Patient has been tolerating simvastatin 40 mg without any myalgias.  Last lipid panel was May 2019, total cholesterol 177 and triglycerides 361, HDL 37 and LDL 68.    ROS:  Denies any Headache, Blurred Vision, Confusion, Chest pain,  Shortness of breath,  Abdominal pain, Changes of bowel or bladder, Lower ext edema, Fevers, Nights sweats, Weight Changes, Focal weakness or numbness.  And all other systems are negative.        Current Outpatient Medications:   •  SYNJARDY 12.5-1000 MG Tab, , Disp: , Rfl:   •  Blood Glucose Monitoring Suppl (FREESTYLE LITE) Device, Inject 1 Device as instructed every day., Disp: , Rfl:   •  simvastatin (ZOCOR) 40 MG Tab, TAKE 1 TABLET BY MOUTH EVERY EVENING., Disp: 90 Tab, Rfl: 3  •  SYNTHROID 200 MCG Tab, Take 1 Tab by mouth every day. On an empty stomach., Disp: 90 Tab, Rfl: 3  •  Semaglutide (OZEMPIC) 1 MG/DOSE Solution Pen-injector, Inject 1 mg as instructed every 7 days., Disp: 2 PEN, Rfl: 11  •  pioglitazone (ACTOS) 30 MG Tab, Take 1 Tab by mouth every day., Disp: 30 Tab, Rfl: 11  •  Blood Glucose Test Strips, Use one Freestyle strip to test blood sugar twice daily., Disp: 100 Strip, Rfl: 4  •  Lancets, Use one Freestyle lancet to test blood sugar twice daily., Disp: 100 Each, Rfl:  4  •  Blood Glucose Meter Kit, Test blood sugar as recommended by provider. Freestyle blood glucose monitoring kit., Disp: 1 Kit, Rfl: 0  •  vitamin D (CHOLECALCIFEROL) 1000 UNIT Tab, Take 3 Tabs by mouth every day., Disp: 60 Tab, Rfl:   •  B-D ULTRAFINE III SHORT PEN 31G X 8 MM Misc, USE DAILY, Disp: 100 Each, Rfl: 2    No Known Allergies    Past Medical History:   Diagnosis Date   • Back pain     ddd   • DIABETES MELLITUS    • Diverticulitis    • GOUT    • Hyperlipidemia    • Hypothyroid    • Pneumonia    • Post-nasal drip    • Rhinitis      Past Surgical History:   Procedure Laterality Date   • APPENDECTOMY     • COLON RESECTION       Family History   Problem Relation Age of Onset   • Diabetes Mother    • Hypertension Mother    • Hyperlipidemia Mother    • Heart Disease Father    • Hypertension Father    • Hyperlipidemia Father    • Heart Attack Father    • Diabetes Sister    • Hyperlipidemia Sister    • Diabetes Brother         Type 2   • Hypertension Brother    • Hyperlipidemia Brother    • Hypertension Sister    • Diabetes Sister         type 2   • Cancer Sister         Lung cancer   • Lung Disease Sister      Social History     Socioeconomic History   • Marital status:      Spouse name: Not on file   • Number of children: Not on file   • Years of education: Not on file   • Highest education level: Not on file   Occupational History   • Not on file   Social Needs   • Financial resource strain: Not on file   • Food insecurity:     Worry: Not on file     Inability: Not on file   • Transportation needs:     Medical: Not on file     Non-medical: Not on file   Tobacco Use   • Smoking status: Current Every Day Smoker     Packs/day: 0.50   • Smokeless tobacco: Never Used   Substance and Sexual Activity   • Alcohol use: Yes     Alcohol/week: 14.4 oz     Types: 24 Cans of beer per week   • Drug use: Yes     Types: Marijuana, Inhaled   • Sexual activity: Yes     Partners: Female   Lifestyle   • Physical activity:  "    Days per week: Not on file     Minutes per session: Not on file   • Stress: Not on file   Relationships   • Social connections:     Talks on phone: Not on file     Gets together: Not on file     Attends Jain service: Not on file     Active member of club or organization: Not on file     Attends meetings of clubs or organizations: Not on file     Relationship status: Not on file   • Intimate partner violence:     Fear of current or ex partner: Not on file     Emotionally abused: Not on file     Physically abused: Not on file     Forced sexual activity: Not on file   Other Topics Concern   • Not on file   Social History Narrative   • Not on file       Objective:     Vitals: /70   Pulse 91   Temp 36.6 °C (97.9 °F)   Resp 16   Ht 1.676 m (5' 6\")   Wt 83.9 kg (185 lb)   SpO2 100%   BMI 29.86 kg/m²    General: Alert, pleasant, NAD  HEENT: Normocephalic.   Skin: Warm, dry, no rashes.  Extremities: No leg edema. No discoloration  Neurological: No tremors  Psych:  Affect/mood is normal, judgement is good, memory is intact, grooming is appropriate.    Assessment/Plan:     Diagnoses and all orders for this visit:    Type 2 diabetes mellitus with hemoglobin A1c goal of less than 7.0% ([facility])  Improving.  Last A1c was 6.8%.  Recently changed to Ozempic, Synjardy and Actos.  He will be following up with endocrinology in 6 months.    Other specified hypothyroidism  Stable at this time.  Continue to follow-up with endocrinology.  Last TSH May 2019.    Dyslipidemia  Stable.  Continue to work on reducing alcohol intake and processed food.  Triglycerides remain elevated.  Continue to take simvastatin-tolerating without any myalgias.  Lipid panel up-to-date.      Return in about 6 months (around 3/5/2020).          Vidhi QUINTERO"

## 2019-10-01 ENCOUNTER — IMMUNIZATION (OUTPATIENT)
Dept: SOCIAL WORK | Facility: CLINIC | Age: 64
End: 2019-10-01
Payer: MEDICARE

## 2019-10-01 DIAGNOSIS — Z23 NEED FOR VACCINATION: ICD-10-CM

## 2019-10-01 PROCEDURE — G0008 ADMIN INFLUENZA VIRUS VAC: HCPCS | Performed by: REGISTERED NURSE

## 2019-10-01 PROCEDURE — 90662 IIV NO PRSV INCREASED AG IM: CPT | Performed by: REGISTERED NURSE

## 2020-01-07 ENCOUNTER — PATIENT OUTREACH (OUTPATIENT)
Dept: HEALTH INFORMATION MANAGEMENT | Facility: OTHER | Age: 65
End: 2020-01-07

## 2020-01-07 NOTE — PROGRESS NOTES
Outcome: Left Message    Please transfer to Patient Outreach Team at [phone] when patient returns call.    [facility] Verified: yes    Attempt # 1

## 2020-01-07 NOTE — PROGRESS NOTES
1. [facility] Verified: yes    2. Verify PCP: yes    3. Review and add  to Care Team: yes    4. Reviewed/Updated the following with patient:       •   Communication Preference Obtained? YES  • MyChart Activation: already active       •   E-Mail Address Obtained? YES       •   Appointment Day and Time Preferences? YES       •   Preferred Pharmacy? YES       •   Preferred Lab? YES    6. Care Gap Scheduling (Attempt to Schedule EACH Overdue Care Gap!)    SCP PA introduction completed/ Pt stated that has no questions or concerns at this time.

## 2020-02-25 ENCOUNTER — OFFICE VISIT (OUTPATIENT)
Dept: ENDOCRINOLOGY | Facility: MEDICAL CENTER | Age: 65
End: 2020-02-25
Payer: MEDICARE

## 2020-02-25 VITALS
DIASTOLIC BLOOD PRESSURE: 74 MMHG | HEART RATE: 90 BPM | SYSTOLIC BLOOD PRESSURE: 102 MMHG | HEIGHT: 66 IN | BODY MASS INDEX: 29.6 KG/M2 | OXYGEN SATURATION: 95 % | WEIGHT: 184.2 LBS

## 2020-02-25 DIAGNOSIS — E11.9 TYPE 2 DIABETES MELLITUS WITH HEMOGLOBIN A1C GOAL OF LESS THAN 7.0% (HCC): Chronic | ICD-10-CM

## 2020-02-25 LAB
HBA1C MFR BLD: 6.3 % (ref 0–5.6)
INT CON NEG: ABNORMAL
INT CON POS: ABNORMAL

## 2020-02-25 PROCEDURE — 83036 HEMOGLOBIN GLYCOSYLATED A1C: CPT | Performed by: PHYSICIAN ASSISTANT

## 2020-02-25 PROCEDURE — 99213 OFFICE O/P EST LOW 20 MIN: CPT | Performed by: PHYSICIAN ASSISTANT

## 2020-02-25 RX ORDER — METFORMIN HYDROCHLORIDE 500 MG/1
1000 TABLET, EXTENDED RELEASE ORAL 2 TIMES DAILY
Qty: 120 TAB | Refills: 11 | Status: SHIPPED | OUTPATIENT
Start: 2020-02-25 | End: 2020-02-26 | Stop reason: SDUPTHER

## 2020-02-25 NOTE — PATIENT INSTRUCTIONS
Now on:  1.  Ozempic 1.0 once a week   2.  Synjardy 12.5/1000 one in the AM one int he PM (wants to stop)  3.  Actos 30mg one a day get at pharmacy  4.  Metformin ER 500mg two in the AM two in the PM

## 2020-02-25 NOTE — PROGRESS NOTES
Return to office Patient Consult Note  Referred by: LEON Hernandez    Reason for consult: Diabetes Management Type 2    HPI:  Kristian Frank is a 64 y.o. old patient who is seeing us today for diabetes care.  This is a pleasant patient with diabetes and I appreciate the opportunity to participate in the care of this patient.    Labs of 2/25/2020 HbA1c is 6.3  Labs of 9/3/2019 HbA1c is 6.8  Labs of 5/10/2019 HbA1c is 7.4 GFR >60  Labs of 1/15/19 HbA1c is 7.3, GFR 60      1. Type 2 diabetes mellitus with hemoglobin A1c goal of less than 7.0% ([facility])    This is a new patient with me on 7/22/2019  They were on:  1.   Victoza 1.8 once a day  2.   Glimepiride  3.   Farxiga 5  4.   Metformin 1000 twice a day  5.   Lisinopril       Now on:  1.  Ozempic 1.0 once a week   2.  Synjardy 12.5/1000 one in the AM one int he PM  3.  Actos 30mg one a day get at pharmacy      ROS:   Cardiac: No chest pain,   Resp: No shortness of breath,   Gi: No Diarrhea        Past Medical History:  Patient Active Problem List    Diagnosis Date Noted   • Uncomplicated alcohol dependence ([facility]) 05/08/2019   • Carotid atherosclerosis 03/27/2018   • Left cervical radiculopathy 01/29/2018   • Left hand weakness 01/29/2018   • Allergic rhinitis 04/18/2016   • Bilateral low back pain without sciatica 04/18/2016   • Abnormal EKG 02/11/2016   • Cigarette nicotine dependence without complication 02/11/2016   • Obesity (BMI 30.0-34.9) 02/11/2016   • Sleep apnea 02/11/2016   • Tenosynovitis of wrist 02/11/2016   • Type 2 diabetes mellitus with hemoglobin A1c goal of less than 7.0% ([facility]) 10/10/2013   • Vitamin D deficiency disease 12/04/2012   • Dyslipidemia    • Hypothyroid        Past Surgical History:  Past Surgical History:   Procedure Laterality Date   • APPENDECTOMY     • COLON RESECTION         Allergies:  Patient has no known allergies.    Social History:  Social History     Socioeconomic History   • Marital status:       Spouse name: Not on file   • Number of children: Not on file   • Years of education: Not on file   • Highest education level: Not on file   Occupational History   • Not on file   Social Needs   • Financial resource strain: Not on file   • Food insecurity     Worry: Not on file     Inability: Not on file   • Transportation needs     Medical: Not on file     Non-medical: Not on file   Tobacco Use   • Smoking status: Current Every Day Smoker     Packs/day: 0.50   • Smokeless tobacco: Never Used   Substance and Sexual Activity   • Alcohol use: Yes     Alcohol/week: 14.4 oz     Types: 24 Cans of beer per week   • Drug use: Yes     Types: Marijuana, Inhaled   • Sexual activity: Yes     Partners: Female   Lifestyle   • Physical activity     Days per week: Not on file     Minutes per session: Not on file   • Stress: Not on file   Relationships   • Social connections     Talks on phone: Not on file     Gets together: Not on file     Attends [employer] service: Not on file     Active member of club or organization: Not on file     Attends meetings of clubs or organizations: Not on file     Relationship status: Not on file   • Intimate partner violence     Fear of current or ex partner: Not on file     Emotionally abused: Not on file     Physically abused: Not on file     Forced sexual activity: Not on file   Other Topics Concern   • Not on file   Social History Narrative   • Not on file       Family History:  Family History   Problem Relation Age of Onset   • Diabetes Mother    • Hypertension Mother    • Hyperlipidemia Mother    • Heart Disease Father    • Hypertension Father    • Hyperlipidemia Father    • Heart Attack Father    • Diabetes Sister    • Hyperlipidemia Sister    • Diabetes Brother         Type 2   • Hypertension Brother    • Hyperlipidemia Brother    • Hypertension Sister    • Diabetes Sister         type 2   • Cancer Sister         Lung cancer   • Lung Disease Sister        Medications:    Current Outpatient  "Medications:   •  metFORMIN ER (GLUCOPHAGE XR) 500 MG TABLET SR 24 HR, Take 2 Tabs by mouth 2 times a day. Please give Generic XR Metformin, Disp: 120 Tab, Rfl: 11  •  Blood Glucose Monitoring Suppl (FREESTYLE LITE) Device, Inject 1 Device as instructed every day., Disp: , Rfl:   •  simvastatin (ZOCOR) 40 MG Tab, TAKE 1 TABLET BY MOUTH EVERY EVENING., Disp: 90 Tab, Rfl: 3  •  SYNTHROID 200 MCG Tab, Take 1 Tab by mouth every day. On an empty stomach., Disp: 90 Tab, Rfl: 3  •  Semaglutide (OZEMPIC) 1 MG/DOSE Solution Pen-injector, Inject 1 mg as instructed every 7 days., Disp: 2 PEN, Rfl: 11  •  pioglitazone (ACTOS) 30 MG Tab, Take 1 Tab by mouth every day., Disp: 30 Tab, Rfl: 11  •  Blood Glucose Test Strips, Use one Freestyle strip to test blood sugar twice daily., Disp: 100 Strip, Rfl: 4  •  Lancets, Use one Freestyle lancet to test blood sugar twice daily., Disp: 100 Each, Rfl: 4  •  Blood Glucose Meter Kit, Test blood sugar as recommended by provider. Freestyle blood glucose monitoring kit., Disp: 1 Kit, Rfl: 0  •  vitamin D (CHOLECALCIFEROL) 1000 UNIT Tab, Take 3 Tabs by mouth every day., Disp: 60 Tab, Rfl:   •  B-D ULTRAFINE III SHORT PEN 31G X 8 MM Misc, USE DAILY, Disp: 100 Each, Rfl: 2      Physical Examination:   Vital signs: /74 (BP Location: Left arm, Patient Position: Sitting, BP Cuff Size: Adult)   Pulse 90   Ht 1.676 m (5' 5.98\")   Wt 83.6 kg (184 lb 3.2 oz)   SpO2 95%   BMI 29.75 kg/m²   General: No distress, cooperative, well dressed and well nourished.   Resp: Normal effort, Bilateral clear to auscultation, No wheezing, No rales  CVS: Regular rate and rhythm, S1 S2 normal, No murmur.       Assessment and Plan:    1. Type 2 diabetes mellitus with hemoglobin A1c goal of less than 7.0% ([facility])    Now on:  1.  Ozempic 1.0 once a week   2.  Synjardy 12.5/1000 one in the AM one int he PM (wants to stop)  3.  Actos 30mg one a day get at pharmacy  4.  Metformin ER 500mg two in the AM two in the " PM      Return in about 3 months (around 5/25/2020).        Thank you kindly for allowing me to participate in the diabetes care plan for this patient.    Jason Holbrook PA-C, BC-ADM  Board Certified - Advanced Diabetes Management  02/25/20    CC:   LEON Hernandez

## 2020-02-27 NOTE — TELEPHONE ENCOUNTER
Received request via: Patient    Was the patient seen in the last year in this department? Yes    Does the patient have an active prescription (recently filled or refills available) for medication(s) requested? No     Metformin refill 100 day refill

## 2020-02-28 RX ORDER — METFORMIN HYDROCHLORIDE 500 MG/1
1000 TABLET, EXTENDED RELEASE ORAL 2 TIMES DAILY
Qty: 400 TAB | Refills: 2 | Status: SHIPPED | OUTPATIENT
Start: 2020-02-28 | End: 2021-03-02 | Stop reason: SDUPTHER

## 2020-03-10 ENCOUNTER — APPOINTMENT (RX ONLY)
Dept: URBAN - METROPOLITAN AREA CLINIC 22 | Facility: CLINIC | Age: 65
Setting detail: DERMATOLOGY
End: 2020-03-10

## 2020-03-10 DIAGNOSIS — L82.1 OTHER SEBORRHEIC KERATOSIS: ICD-10-CM

## 2020-03-10 DIAGNOSIS — Z71.89 OTHER SPECIFIED COUNSELING: ICD-10-CM

## 2020-03-10 DIAGNOSIS — Z85.828 PERSONAL HISTORY OF OTHER MALIGNANT NEOPLASM OF SKIN: ICD-10-CM

## 2020-03-10 DIAGNOSIS — D18.0 HEMANGIOMA: ICD-10-CM

## 2020-03-10 DIAGNOSIS — D22 MELANOCYTIC NEVI: ICD-10-CM

## 2020-03-10 DIAGNOSIS — L57.0 ACTINIC KERATOSIS: ICD-10-CM

## 2020-03-10 DIAGNOSIS — L81.4 OTHER MELANIN HYPERPIGMENTATION: ICD-10-CM

## 2020-03-10 DIAGNOSIS — M71 OTHER BURSOPATHIES: ICD-10-CM

## 2020-03-10 PROBLEM — M71.342 OTHER BURSAL CYST, LEFT HAND: Status: ACTIVE | Noted: 2020-03-10

## 2020-03-10 PROBLEM — D48.5 NEOPLASM OF UNCERTAIN BEHAVIOR OF SKIN: Status: ACTIVE | Noted: 2020-03-10

## 2020-03-10 PROBLEM — D18.01 HEMANGIOMA OF SKIN AND SUBCUTANEOUS TISSUE: Status: ACTIVE | Noted: 2020-03-10

## 2020-03-10 PROBLEM — D22.5 MELANOCYTIC NEVI OF TRUNK: Status: ACTIVE | Noted: 2020-03-10

## 2020-03-10 PROCEDURE — ? LIQUID NITROGEN

## 2020-03-10 PROCEDURE — 99214 OFFICE O/P EST MOD 30 MIN: CPT | Mod: 25

## 2020-03-10 PROCEDURE — 17003 DESTRUCT PREMALG LES 2-14: CPT

## 2020-03-10 PROCEDURE — 17000 DESTRUCT PREMALG LESION: CPT | Mod: 59

## 2020-03-10 PROCEDURE — ? BIOPSY BY SHAVE METHOD

## 2020-03-10 PROCEDURE — ? COUNSELING

## 2020-03-10 PROCEDURE — 11102 TANGNTL BX SKIN SINGLE LES: CPT

## 2020-03-10 ASSESSMENT — LOCATION SIMPLE DESCRIPTION DERM
LOCATION SIMPLE: LEFT INDEX FINGER
LOCATION SIMPLE: RIGHT CHEEK
LOCATION SIMPLE: LEFT UPPER BACK
LOCATION SIMPLE: LEFT TEMPLE
LOCATION SIMPLE: RIGHT EAR
LOCATION SIMPLE: LEFT FOREHEAD
LOCATION SIMPLE: ABDOMEN
LOCATION SIMPLE: RIGHT THIGH
LOCATION SIMPLE: NOSE
LOCATION SIMPLE: RIGHT LOWER BACK
LOCATION SIMPLE: SCALP
LOCATION SIMPLE: POSTERIOR SCALP

## 2020-03-10 ASSESSMENT — LOCATION ZONE DERM
LOCATION ZONE: FACE
LOCATION ZONE: TRUNK
LOCATION ZONE: EAR
LOCATION ZONE: LEG
LOCATION ZONE: SCALP
LOCATION ZONE: FINGER
LOCATION ZONE: NOSE

## 2020-03-10 ASSESSMENT — LOCATION DETAILED DESCRIPTION DERM
LOCATION DETAILED: RIGHT MID PREAURICULAR CHEEK
LOCATION DETAILED: RIGHT ANTERIOR PROXIMAL THIGH
LOCATION DETAILED: RIGHT INFERIOR PREAURICULAR CHEEK
LOCATION DETAILED: LEFT NASAL DORSUM
LOCATION DETAILED: LEFT FOREHEAD
LOCATION DETAILED: LEFT CENTRAL TEMPLE
LOCATION DETAILED: LEFT MEDIAL FOREHEAD
LOCATION DETAILED: NASAL DORSUM
LOCATION DETAILED: LEFT SUPERIOR MEDIAL UPPER BACK
LOCATION DETAILED: LEFT CENTRAL PARIETAL SCALP
LOCATION DETAILED: EPIGASTRIC SKIN
LOCATION DETAILED: RIGHT SUPERIOR MEDIAL MIDBACK
LOCATION DETAILED: LEFT SUPERIOR PARIETAL SCALP
LOCATION DETAILED: LEFT INFERIOR FOREHEAD
LOCATION DETAILED: POSTERIOR MID-PARIETAL SCALP
LOCATION DETAILED: LEFT DISTAL DORSAL INDEX FINGER
LOCATION DETAILED: RIGHT SCAPHA

## 2020-03-10 NOTE — PROCEDURE: BIOPSY BY SHAVE METHOD
Detail Level: Detailed
Depth Of Biopsy: dermis
Was A Bandage Applied: Yes
Size Of Lesion In Cm: 0
Biopsy Type: H and E
Biopsy Method: Personna blade
Anesthesia Type: 1% lidocaine with 1:100,000 epinephrine and a 1:3 solution of 8.4% sodium bicarbonate
Anesthesia Volume In Cc: 1
Hemostasis: Drysol
Wound Care: Vaseline
Dressing: bandage
Destruction After The Procedure: No
Type Of Destruction Used: Curettage
Curettage Text: The wound bed was treated with curettage after the biopsy was performed.
Cryotherapy Text: The wound bed was treated with cryotherapy after the biopsy was performed.
Electrodesiccation Text: The wound bed was treated with electrodesiccation after the biopsy was performed.
Electrodesiccation And Curettage Text: The wound bed was treated with electrodesiccation and curettage after the biopsy was performed.
Silver Nitrate Text: The wound bed was treated with silver nitrate after the biopsy was performed.
Lab: [id number]
Lab Facility: 
Consent: Written consent was obtained and risks were reviewed including but not limited to scarring, infection, bleeding, scabbing, incomplete removal, nerve damage and allergy to anesthesia.
Post-Care Instructions: I reviewed with the patient in detail post-care instructions. Patient is to keep the biopsy site dry overnight, and then apply bacitracin twice daily until healed. Patient may apply hydrogen peroxide soaks to remove any crusting.
Notification Instructions: Patient will be notified of biopsy results. However, patient instructed to call the office if not contacted within 2 weeks.
Billing Type: Third-Party Bill
Information: Selecting Yes will display possible errors in your note based on the variables you have selected. This validation is only offered as a suggestion for you. PLEASE NOTE THAT THE VALIDATION TEXT WILL BE REMOVED WHEN YOU FINALIZE YOUR NOTE. IF YOU WANT TO FAX A PRELIMINARY NOTE YOU WILL NEED TO TOGGLE THIS TO 'NO' IF YOU DO NOT WANT IT IN YOUR FAXED NOTE.

## 2020-03-10 NOTE — PROCEDURE: LIQUID NITROGEN
Duration Of Freeze Thaw-Cycle (Seconds): 3
Detail Level: Detailed
Render Post-Care Instructions In Note?: no
Number Of Freeze-Thaw Cycles: 2 freeze-thaw cycles
Post-Care Instructions: I reviewed with the patient in detail post-care instructions. Patient is to wear sunprotection, and avoid picking at any of the treated lesions. Pt may apply Vaseline to crusted or scabbing areas.
Consent: The patient's consent was obtained including but not limited to risks of crusting, scabbing, blistering, scarring, darker or lighter pigmentary change, recurrence, incomplete removal and infection.

## 2020-03-17 RX ORDER — LEVOTHYROXINE SODIUM 200 MCG
200 TABLET ORAL
Qty: 90 TAB | Refills: 3 | Status: SHIPPED | OUTPATIENT
Start: 2020-03-17 | End: 2020-03-18 | Stop reason: SDUPTHER

## 2020-03-17 RX ORDER — SEMAGLUTIDE 1.34 MG/ML
1 INJECTION, SOLUTION SUBCUTANEOUS
Qty: 7 PEN | Refills: 2 | Status: SHIPPED | OUTPATIENT
Start: 2020-03-17 | End: 2020-08-10 | Stop reason: SDUPTHER

## 2020-03-18 RX ORDER — LEVOTHYROXINE SODIUM 200 MCG
200 TABLET ORAL
Qty: 90 TAB | Refills: 3 | Status: SHIPPED | OUTPATIENT
Start: 2020-03-18 | End: 2021-04-02 | Stop reason: SDUPTHER

## 2020-05-22 NOTE — PROGRESS NOTES
1. Attempt #:Final    2. [facility] Verified: yes    3. Verify PCP: yes    4. Reviewed/Updated the following with patient:       •   Communication Preference Obtained? YES       •   Preferred Pharmacy? YES       •   Preferred Lab? YES       •   Family History (document living status of immediate family members and if + hx of cancer, diabetes, hypertension, hyperlipidemia, heart attack, stroke) NO    5. Annual Wellness Visit Scheduling  · Scheduling Status:Scheduled     6. Care Gap Scheduling (Attempt to Schedule EACH Overdue Care Gap!)     Health Maintenance Due   Topic Date Due   • HEPATITIS C SCREENING  1955   • DIABETES MONOFILAMENT / LE EXAM  01/22/2020   • RETINAL SCREENING  02/15/2020   • Annual Wellness Visit  05/08/2020   • FASTING LIPID PROFILE  05/10/2020   • URINE ACR / MICROALBUMIN  05/10/2020   • SERUM CREATININE  05/10/2020   • PSA Screening  05/10/2020        Scheduled patient for Diabetes Monofilament Exam      Patient has completed Retinal Eye Exam requested records from Dr. Tello Fuller O.D    7. [employer] Activation: already active    8. Patient was advised: “This is a free wellness visit. The provider will screen for medical conditions to help you stay healthy. If you have other concerns to address you may be asked to discuss these at a separate visit or there may be an additional fee.”     9. Patient was informed to arrive 15 min prior to their scheduled appointment and bring in their medication bottles.                  .

## 2020-05-26 ENCOUNTER — OFFICE VISIT (OUTPATIENT)
Dept: MEDICAL GROUP | Facility: MEDICAL CENTER | Age: 65
End: 2020-05-26
Payer: MEDICARE

## 2020-05-26 VITALS
DIASTOLIC BLOOD PRESSURE: 76 MMHG | OXYGEN SATURATION: 94 % | SYSTOLIC BLOOD PRESSURE: 110 MMHG | HEIGHT: 66 IN | BODY MASS INDEX: 31.34 KG/M2 | HEART RATE: 87 BPM | WEIGHT: 195 LBS

## 2020-05-26 DIAGNOSIS — E78.5 DYSLIPIDEMIA: Chronic | ICD-10-CM

## 2020-05-26 DIAGNOSIS — Z00.00 MEDICARE ANNUAL WELLNESS VISIT, SUBSEQUENT: ICD-10-CM

## 2020-05-26 DIAGNOSIS — J30.2 SEASONAL ALLERGIC RHINITIS, UNSPECIFIED TRIGGER: Chronic | ICD-10-CM

## 2020-05-26 DIAGNOSIS — F17.210 CIGARETTE NICOTINE DEPENDENCE WITHOUT COMPLICATION: Chronic | ICD-10-CM

## 2020-05-26 DIAGNOSIS — F10.20 UNCOMPLICATED ALCOHOL DEPENDENCE (HCC): ICD-10-CM

## 2020-05-26 DIAGNOSIS — I65.23 ATHEROSCLEROSIS OF BOTH CAROTID ARTERIES: ICD-10-CM

## 2020-05-26 DIAGNOSIS — E03.4 HYPOTHYROIDISM DUE TO ACQUIRED ATROPHY OF THYROID: Chronic | ICD-10-CM

## 2020-05-26 DIAGNOSIS — E55.9 VITAMIN D DEFICIENCY: Chronic | ICD-10-CM

## 2020-05-26 DIAGNOSIS — E66.9 OBESITY (BMI 30-39.9): ICD-10-CM

## 2020-05-26 DIAGNOSIS — G47.30 SLEEP APNEA, UNSPECIFIED TYPE: ICD-10-CM

## 2020-05-26 DIAGNOSIS — E11.41 TYPE 2 DIABETES MELLITUS WITH DIABETIC MONONEUROPATHY, WITHOUT LONG-TERM CURRENT USE OF INSULIN (HCC): ICD-10-CM

## 2020-05-26 DIAGNOSIS — Z12.5 ENCOUNTER FOR SCREENING FOR MALIGNANT NEOPLASM OF PROSTATE: ICD-10-CM

## 2020-05-26 DIAGNOSIS — Z11.59 ENCOUNTER FOR HEPATITIS C SCREENING TEST FOR LOW RISK PATIENT: ICD-10-CM

## 2020-05-26 PROCEDURE — G0439 PPPS, SUBSEQ VISIT: HCPCS | Performed by: NURSE PRACTITIONER

## 2020-05-26 ASSESSMENT — ACTIVITIES OF DAILY LIVING (ADL): BATHING_REQUIRES_ASSISTANCE: 0

## 2020-05-26 ASSESSMENT — PATIENT HEALTH QUESTIONNAIRE - PHQ9: CLINICAL INTERPRETATION OF PHQ2 SCORE: 0

## 2020-05-26 ASSESSMENT — ENCOUNTER SYMPTOMS: GENERAL WELL-BEING: EXCELLENT

## 2020-05-26 NOTE — LETTER
[employer] [facility]  Vidhi Bautista A.P.R.N.  [address] [facility] [address]  [place] [address]  Fax: [phone]   Authorization for Release/Disclosure of   Protected Health Information   Name: SURESH ART : 1955 SSN: [id number]   Address: [address] [place] [address] [place] [address] Phone:    [phone] (home)    I authorize the entity listed below to release/disclose the PHI below to:   [employer]/Vidhi Bautista, A.P.R.N. and Vidhi Bautista A.P.R.N.   Provider or Entity Name:  Dr. Fuller ([facility])   Address   City, [facility] Phone:      Fax:     Reason for request: continuity of care   Information to be released:    [  ] LAST COLONOSCOPY,  including any PATH REPORT and follow-up  [  ] LAST FIT/COLOGUARD RESULT [  ] LAST DEXA  [  ] LAST MAMMOGRAM  [  ] LAST PAP  [  ] LAST LABS [x ] RETINA EXAM REPORT  [  ] IMMUNIZATION RECORDS  [  ] Release all info      [  ] Check here and initial the line next to each item to release ALL health information INCLUDING  _____ Care and treatment for drug and / or alcohol abuse  _____ HIV testing, infection status, or AIDS  _____ Genetic Testing    DATES OF SERVICE OR TIME PERIOD TO BE DISCLOSED: _____________  I understand and acknowledge that:  * This Authorization may be revoked at any time by you in writing, except if your health information has already been used or disclosed.  * Your health information that will be used or disclosed as a result of you signing this authorization could be re-disclosed by the recipient. If this occurs, your re-disclosed health information may no longer be protected by State or Federal laws.  * You may refuse to sign this Authorization. Your refusal will not affect your ability to obtain treatment.  * This Authorization becomes effective upon signing and will  on (date) __________.      If no date is indicated, this Authorization will  one (1) year from the signature date.    Name:  Kristian Frank    Signature:   Date:     5/26/2020       PLEASE FAX REQUESTED RECORDS BACK TO: [phone]

## 2020-05-26 NOTE — PROGRESS NOTES
Chief Complaint   Patient presents with   • Medicare Annual Wellness       HPI:  Kristian ALDRIDGE is a 65 y.o. here for Medicare Annual Wellness Visit      Patient Active Problem List    Diagnosis Date Noted   • Obesity (BMI 30-39.9) 05/26/2020   • Uncomplicated alcohol dependence (HCC) 05/08/2019   • Carotid atherosclerosis 03/27/2018   • Left cervical radiculopathy 01/29/2018   • Allergic rhinitis 04/18/2016   • Bilateral low back pain without sciatica 04/18/2016   • Abnormal EKG 02/11/2016   • Cigarette nicotine dependence without complication 02/11/2016   • Sleep apnea 02/11/2016   • Tenosynovitis of wrist 02/11/2016   • Type 2 diabetes mellitus with hemoglobin A1c goal of less than 7.0% ([facility]) 10/10/2013   • Vitamin D deficiency disease 12/04/2012   • Dyslipidemia    • Hypothyroid        Current Outpatient Medications   Medication Sig Dispense Refill   • SYNTHROID 200 MCG Tab Take 1 Tab by mouth every day. On an empty stomach. 90 Tab 3   • Semaglutide, 1 MG/DOSE, (OZEMPIC, 1 MG/DOSE,) 2 MG/1.5ML Solution Pen-injector Inject 1 mg as instructed every 7 days. 7 PEN 2   • metFORMIN ER (GLUCOPHAGE XR) 500 MG TABLET SR 24 HR Take 2 Tabs by mouth 2 times a day. Please give Generic XR Metformin 400 Tab 2   • Blood Glucose Monitoring Suppl (FREESTYLE LITE) Device Inject 1 Device as instructed every day.     • pioglitazone (ACTOS) 30 MG Tab Take 1 Tab by mouth every day. 30 Tab 11   • Blood Glucose Test Strips Use one Freestyle strip to test blood sugar twice daily. 100 Strip 4   • Lancets Use one Freestyle lancet to test blood sugar twice daily. 100 Each 4   • Blood Glucose Meter Kit Test blood sugar as recommended by provider. Freestyle blood glucose monitoring kit. 1 Kit 0   • vitamin D (CHOLECALCIFEROL) 1000 UNIT Tab Take 3 Tabs by mouth every day. 60 Tab    • simvastatin (ZOCOR) 40 MG Tab TAKE ONE TABLET BY MOUTH EVERY EVENING 100 Tab 2   • B-D ULTRAFINE III SHORT PEN 31G X 8 MM Misc USE DAILY (Patient not taking: Reported  on 5/26/2020) 100 Each 2     No current facility-administered medications for this visit.         Patient is taking medications as noted in medication list.  Current supplements as per medication list.     Allergies: Patient has no known allergies.    Current social contact/activities:     Is patient current with immunizations? Yes.    He  reports that he has been smoking. He has been smoking about 0.50 packs per day. He has never used smokeless tobacco. He reports current alcohol use of about 14.4 oz of alcohol per week. He reports current drug use. Drugs: Marijuana and Inhaled.  Ready to quit: Not Answered  Counseling given: Not Answered        DPA/Advanced directive: Patient does not have an Advanced Directive.  A packet and workshop information was given on Advanced Directives.    ROS:    Gait: Uses no assistive device   Ostomy: No   Other tubes: No   Amputations: No   Chronic oxygen use No   Last eye exam 02/2020  Wears hearing aids: No   : Denies any urinary leakage during the last 6 months      Screening:      Depression Screening    Little interest or pleasure in doing things?  0 - not at all  Feeling down, depressed, or hopeless? 0 - not at all  Patient Health Questionnaire Score: 0    If depressive symptoms identified deferred to follow up visit unless specifically addressed in assessment and plan.    Interpretation of PHQ-9 Total Score   Score Severity   1-4 No Depression   5-9 Mild Depression   10-14 Moderate Depression   15-19 Moderately Severe Depression   20-27 Severe Depression    Screening for Cognitive Impairment    Three Minute Recall (village, kitchen, baby)  3/3    Finn clock face with all 12 numbers and set the hands to show 10 past 10.  Yes    If cognitive concerns identified, deferred for follow up unless specifically addressed in assessment and plan.    Fall Risk Assessment    Has the patient had two or more falls in the last year or any fall with injury in the last year?  No  If fall risk  identified, deferred for follow up unless specifically addressed in assessment and plan.    Safety Assessment    Throw rugs on floor.  No  Handrails on all stairs.  Yes  Good lighting in all hallways.  Yes  Difficulty hearing.  No  Patient counseled about all safety risks that were identified.    Functional Assessment ADLs    Are there any barriers preventing you from cooking for yourself or meeting nutritional needs?  No.    Are there any barriers preventing you from driving safely or obtaining transportation?  No.    Are there any barriers preventing you from using a telephone or calling for help?  No.    Are there any barriers preventing you from shopping?  No.    Are there any barriers preventing you from taking care of your own finances?  No.    Are there any barriers preventing you from managing your medications?  No.    Are there any barriers preventing you from showering, bathing or dressing yourself?  No.    Are you currently engaging in any exercise or physical activity?  Yes.     What is your perception of your health?  Excellent.    Health Maintenance Summary                HEPATITIS C SCREENING Overdue 1955     DIABETES MONOFILAMENT / LE EXAM Overdue 1/22/2020      Done 1/22/2019 AMB DIABETIC MONOFILAMENT LOWER EXTREMITY EXAM     Patient has more history with this topic...    FASTING LIPID PROFILE Overdue 5/10/2020      Done 5/10/2019 LIPID PROFILE     Patient has more history with this topic...    URINE ACR / MICROALBUMIN Overdue 5/10/2020      Done 5/10/2019 MICROALBUMIN CREAT RATIO URINE     Patient has more history with this topic...    SERUM CREATININE Overdue 5/10/2020      Done 5/10/2019 COMP METABOLIC PANEL     Patient has more history with this topic...    PSA Screening Overdue 5/10/2020      Done 5/10/2019 PROSTATE SPECIFIC AG SCREENING     Patient has more history with this topic...    COLONOSCOPY Next Due 8/5/2020      Done 8/5/2015 AMB REFERRAL TO GI FOR COLONOSCOPY    A1C SCREENING  Next Due 8/25/2020      Done 2/25/2020 POCT A1C     Patient has more history with this topic...    RETINAL SCREENING Next Due 2/18/2021      Done 2/18/2020 REFERRAL FOR RETINAL SCREENING EXAM     Patient has more history with this topic...    Annual Wellness Visit Next Due 5/27/2021      Done 5/26/2020 SUBSEQUENT ANNUAL WELLNESS VISIT-INCLUDES PPPS ()     Patient has more history with this topic...    IMM PNEUMOCOCCAL VACCINE: 65+ Years Next Due 10/17/2022      Done 5/6/2019 Imm Admin: Pneumococcal Conjugate Vaccine (Prevnar/PCV-13)     Patient has more history with this topic...    IMM DTaP/Tdap/Td Vaccine Next Due 11/8/2023      Done 11/8/2013 Imm Admin: Tdap Vaccine          Patient Care Team:  LEON Hernandez as PCP - General (Nurse Practitioner)  Tello Fuller O.D. as Consulting Physician (Optometry)  Jason Holbrook P.A.-C. (Endocrinology)  Tsering Rodriguez as      Social History     Tobacco Use   • Smoking status: Current Every Day Smoker     Packs/day: 0.50   • Smokeless tobacco: Never Used   Substance Use Topics   • Alcohol use: Yes     Alcohol/week: 14.4 oz     Types: 24 Cans of beer per week   • Drug use: Yes     Types: Marijuana, Inhaled     Family History   Problem Relation Age of Onset   • Diabetes Mother    • Hypertension Mother    • Hyperlipidemia Mother    • Heart Disease Father    • Hypertension Father    • Hyperlipidemia Father    • Heart Attack Father    • Diabetes Sister    • Hyperlipidemia Sister    • Diabetes Brother         Type 2   • Hypertension Brother    • Hyperlipidemia Brother    • Hypertension Sister    • Diabetes Sister         type 2   • Cancer Sister         Lung cancer   • Lung Disease Sister      He  has a past medical history of Back pain, DIABETES MELLITUS, Diverticulitis, GOUT, Hyperlipidemia, Hypothyroid, Pneumonia, Post-nasal drip, and Rhinitis.   Past Surgical History:   Procedure Laterality Date   • APPENDECTOMY     • COLON  "RESECTION             Exam:     /76 (BP Location: Right arm, Patient Position: Sitting, BP Cuff Size: Adult)   Pulse 87   Ht 1.676 m (5' 6\")   Wt 88.5 kg (195 lb)   SpO2 94%  Body mass index is 31.47 kg/m².    Hearing good.    Dentition fair, recent dental work  Alert, oriented in no acute distress.  Eye contact is good, speech goal directed, affect calm      Assessment and Plan. The following treatment and monitoring plan is recommended:    1. Medicare annual wellness visit, subsequent   Have reviewed recommend screenings and immunizations. Recommend smoking cessation, weight loss and alcohol reduction.  Subsequent Annual Wellness Visit - Includes PPPS ()   2. Type 2 diabetes mellitus with diabetic mononeuropathy, without long-term current use of insulin (HCC)   Chronic. Last A1C 6.3%. Followed by Endocrinology. Continue with recommendation. Subsequent Annual Wellness Visit - Includes PPPS ()   .  Comp Metabolic Panel    HEMOGLOBIN A1C    MICROALBUMIN CREAT RATIO URINE (LAB COLLECT)    CBC WITHOUT DIFFERENTIAL   3. Uncomplicated alcohol dependence (HCC)   Chronic. Patient continues to deny problem with this. He is a [employer]. Does not interfere with his daily functions.  Subsequent Annual Wellness Visit - Includes PPPS ()   4. Dyslipidemia   Chronic. Tolerating Simvastatin without myalgias. Recommend smoking cessation, alcohol reduction. Subsequent Annual Wellness Visit - Includes PPPS ()    Lipid Profile   5. Atherosclerosis of both carotid arteries   Chronic. Last Doppler 2018-no stenosis. Continue statin.  Subsequent Annual Wellness Visit - Includes PPPS ()   6. Hypothyroidism due to acquired atrophy of thyroid   Chronic. Due for labs. Feels euthyroid. Continue current regimen.  Subsequent Annual Wellness Visit - Includes PPPS ()    CBC WITHOUT DIFFERENTIAL   7. Obesity (BMI 30-39.9)  Chronic. Continue working on weight loss, heart healthy diet and exercise.    Patient " identified as having weight management issue.  Appropriate orders and counseling given.    Subsequent Annual Wellness Visit - Includes PPPS ()   8. Sleep apnea, unspecified type   Chronic. Per patient report unable to tolerate CPAP. Subsequent Annual Wellness Visit - Includes PPPS ()   9. Cigarette nicotine dependence without complication   Chronic. Recommend cessation-declines interest.  Subsequent Annual Wellness Visit - Includes PPPS ()   10. Seasonal allergic rhinitis, unspecified trigger   Chronic. Controlled on OTC regimen.  Subsequent Annual Wellness Visit - Includes PPPS ()   11. Vitamin D deficiency disease   Chronic. Continue taking OTC Vitamin D. Subsequent Annual Wellness Visit - Includes PPPS ()   12. Encounter for screening for malignant neoplasm of prostate  Subsequent Annual Wellness Visit - Includes PPPS ()    PROSTATE SPECIFIC AG SCREENING   13. Encounter for hepatitis C screening test for low risk patient  Subsequent Annual Wellness Visit - Includes PPPS ()    HEP C VIRUS ANTIBODY         Services suggested: No services needed at this time  Health Care Screening recommendations as per orders if indicated.  Referrals offered: PT/OT/Nutrition counseling/Behavioral Health/Smoking cessation as per orders if indicated.    Discussion today about general wellness and lifestyle habits:    · Prevent falls and reduce trip hazards; Cautioned about securing or removing rugs.  · Have a working fire alarm and carbon monoxide detector;   · Engage in regular physical activity and social activities       Follow-up: No follow-ups on file.

## 2020-05-31 DIAGNOSIS — E78.5 DYSLIPIDEMIA: Chronic | ICD-10-CM

## 2020-06-01 RX ORDER — SIMVASTATIN 40 MG
TABLET ORAL
Qty: 100 TAB | Refills: 2 | Status: SHIPPED | OUTPATIENT
Start: 2020-06-01 | End: 2021-04-02 | Stop reason: SDUPTHER

## 2020-06-07 PROBLEM — R29.898 LEFT HAND WEAKNESS: Status: RESOLVED | Noted: 2018-01-29 | Resolved: 2020-06-07

## 2020-06-15 NOTE — PROGRESS NOTES
Endocrinology Clinic Progress Note  PCP: LEON Hernandez    HPI:  Kristian Frank is a 65 y.o. old patient who comes in today for routine follow up of Management of Uncontrolled Type 2 Diabetes ,Hypothyroidism, Dyslipidemia, and Vitamin D Deficiency.    Hypothyroidism:  Currently taking Synthroid 200 mcg daily.  Results for KRISTIAN FRANK (MRN [id number]) as of 6/14/2020 17:35   Ref. Range 5/10/2019 07:24   TSH Latest Ref Range: 0.380 - 5.330 uIU/mL 1.300   Free T4 by [facility] - TMS Latest Ref Range: 1.1 - 2.4 ng/dL 2.4     Dyslipidemia:  Currently taking Simvastatin 40 mg daily.  Results for KRISTIAN FRANK (MRN [id number]) as of 6/14/2020 17:35   Ref. Range 5/10/2019 07:24   Cholesterol,Tot Latest Ref Range: 100 - 199 mg/dL 177   Triglycerides Latest Ref Range: 0 - 149 mg/dL 361 (H)   HDL Latest Ref Range: >=40 mg/dL 37 (A)   LDL Latest Ref Range: <100 mg/dL 68     Vitamin D Deficiency:  Currently taking Vitamin D 3000 units daily.  Results for KRISTIAN FRANK (MRN [id number]) as of 6/14/2020 17:35   Ref. Range 5/10/2019 07:24   25-Hydroxy   Vitamin D 25 Latest Ref Range: 30 - 100 ng/mL 41     HPI:  Kristian Frank is a 65 y.o. old patient who comes in today for evaluation of above stated problem.    Most Recent HbA1c:   Lab Results   Component Value Date/Time    HBA1C 7.0 (H) 06/19/2020 07:40 AM        Current Diabetes Regimen:  GLP-1 Agent: Ozempic 1 mg daily  Metformin  mg 2 BID  Other: Actos 30 mg daily.    Before Breakfast: 103-130    Hypoglycemia:  None    ROS:  Constitutional: No weight loss  Cardiac: No palpitations or racing heart  Resp: No shortness of breath  Neuro: No numbness or tinging in feet  Endo: No heat or cold intolerance, no polyuria or polydipsia  All other systems were reviewed and were negative.    Past Medical History:  Patient Active Problem List    Diagnosis Date Noted   • Obesity (BMI 30-39.9) 05/26/2020   • Uncomplicated alcohol dependence  ([facility]) 05/08/2019   • Carotid atherosclerosis 03/27/2018   • Left cervical radiculopathy 01/29/2018   • Allergic rhinitis 04/18/2016   • Bilateral low back pain without sciatica 04/18/2016   • Abnormal EKG 02/11/2016   • Cigarette nicotine dependence without complication 02/11/2016   • Sleep apnea 02/11/2016   • Tenosynovitis of wrist 02/11/2016   • Type 2 diabetes mellitus with hemoglobin A1c goal of less than 7.0% ([facility]) 10/10/2013   • Vitamin D deficiency disease 12/04/2012   • Dyslipidemia    • Hypothyroid        Past Surgical History:  Past Surgical History:   Procedure Laterality Date   • APPENDECTOMY     • COLON RESECTION         Allergies:  Patient has no known allergies.    Social History:  Social History     Socioeconomic History   • Marital status:      Spouse name: Not on file   • Number of children: Not on file   • Years of education: Not on file   • Highest education level: Not on file   Occupational History   • Not on file   Social Needs   • Financial resource strain: Not on file   • Food insecurity     Worry: Not on file     Inability: Not on file   • Transportation needs     Medical: Not on file     Non-medical: Not on file   Tobacco Use   • Smoking status: Current Every Day Smoker     Packs/day: 0.50   • Smokeless tobacco: Never Used   Substance and Sexual Activity   • Alcohol use: Yes     Alcohol/week: 14.4 oz     Types: 24 Cans of beer per week   • Drug use: Yes     Types: Marijuana, Inhaled   • Sexual activity: Yes     Partners: Female   Lifestyle   • Physical activity     Days per week: Not on file     Minutes per session: Not on file   • Stress: Not on file   Relationships   • Social connections     Talks on phone: Not on file     Gets together: Not on file     Attends Restoration service: Not on file     Active member of club or organization: Not on file     Attends meetings of clubs or organizations: Not on file     Relationship status: Not on file   • Intimate partner violence     Fear  of current or ex partner: Not on file     Emotionally abused: Not on file     Physically abused: Not on file     Forced sexual activity: Not on file   Other Topics Concern   • Not on file   Social History Narrative   • Not on file       Family History:  Family History   Problem Relation Age of Onset   • Diabetes Mother    • Hypertension Mother    • Hyperlipidemia Mother    • Heart Disease Father    • Hypertension Father    • Hyperlipidemia Father    • Heart Attack Father    • Diabetes Sister    • Hyperlipidemia Sister    • Diabetes Brother         Type 2   • Hypertension Brother    • Hyperlipidemia Brother    • Hypertension Sister    • Diabetes Sister         type 2   • Cancer Sister         Lung cancer   • Lung Disease Sister        Medications:    Current Outpatient Medications:   •  simvastatin (ZOCOR) 40 MG Tab, TAKE ONE TABLET BY MOUTH EVERY EVENING, Disp: 100 Tab, Rfl: 2  •  SYNTHROID 200 MCG Tab, Take 1 Tab by mouth every day. On an empty stomach., Disp: 90 Tab, Rfl: 3  •  Semaglutide, 1 MG/DOSE, (OZEMPIC, 1 MG/DOSE,) 2 MG/1.5ML Solution Pen-injector, Inject 1 mg as instructed every 7 days., Disp: 7 PEN, Rfl: 2  •  metFORMIN ER (GLUCOPHAGE XR) 500 MG TABLET SR 24 HR, Take 2 Tabs by mouth 2 times a day. Please give Generic XR Metformin, Disp: 400 Tab, Rfl: 2  •  Blood Glucose Monitoring Suppl (FREESTYLE LITE) Device, Inject 1 Device as instructed every day., Disp: , Rfl:   •  pioglitazone (ACTOS) 30 MG Tab, Take 1 Tab by mouth every day., Disp: 30 Tab, Rfl: 11  •  Lancets, Use one Freestyle lancet to test blood sugar twice daily., Disp: 100 Each, Rfl: 4  •  Blood Glucose Meter Kit, Test blood sugar as recommended by provider. Freestyle blood glucose monitoring kit., Disp: 1 Kit, Rfl: 0  •  vitamin D (CHOLECALCIFEROL) 1000 UNIT Tab, Take 3 Tabs by mouth every day., Disp: 60 Tab, Rfl:   •  B-D ULTRAFINE III SHORT PEN 31G X 8 MM Misc, USE DAILY, Disp: 100 Each, Rfl: 2  •  Empagliflozin (JARDIANCE) 10 MG Tab, Take  "1 tablet by mouth every day., Disp: 100 Tab, Rfl: 3    Labs: Reviewed    Physical Examination:  Vital signs: /60   Pulse 95   Ht 1.676 m (5' 6\")   Wt 88.5 kg (195 lb)   SpO2 95%   BMI 31.47 kg/m²  Body mass index is 31.47 kg/m².  General: No apparent distress, cooperative  Eyes: No scleral icterus or discharge  ENMT: Normal on external inspection of nose, lips, normal thyroid exam  Neck: No abnormal masses on inspection  Resp: Normal effort, clear to auscultation bilaterally   CVS: Regular rate and rhythm, S1 S2 normal, no murmur   Extremities: No edema  Abdomen: abdominal obesity present  Neuro: Alert and oriented  Skin: No rash  Psych: Normal mood and affect, intact memory and able to make informed decisions    Foot Exam:  Monofilament: done  Monofilament testing with a 10 gram force: sensation intact: intact bilaterally  Visual Inspection: Feet without maceration, ulcers, fissures.  Pedal pulses: intact bilaterally    Assessment and Plan:    1. Uncontrolled type 2 diabetes mellitus with hyperglycemia (HCC)  Dong really well continue current regimen.     2. Acquired hypothyroidism  Continue current dose of levothyroxine.     3. Dyslipidemia  Continue statin.     4. Vitamin D deficiency disease  Recommend over the counter vit D 2 or D 3 : 8000-10,000 IU daily with food. Side effects and benefits discussed with patient in detail.Discussed immune function of vit D and its role in some protection against COVID-19 as per recent early evidence.         Return in about 3 months (around 9/16/2020).    - Discussed diabetic diet discussed in detail-plate method.  - He will test blood sugars and log.  - He will walk for 20-30 minutes daily.  - Reviewed medications and advised how to take.  - Discussed importance of immunizations and yearly eye exams. He saw Dr. Fuller on 2/18/20.  - Advised daily foot exams. Educated on signs of infection.   - Educated on need to stay well hydrated with water.  - Educated to " call with any questions or problems.    Thank you for allowing me to participate in the care of this patient.    Dr. Chauncey Mcdonald      CC:   Vidhi Bautista, A.P.R.N.    This note was created using voice recognition software (Dragon). The accuracy of the dictation is limited by the abilities of the software. I have reviewed the note prior to signing, however some errors in grammar and context are still possible. If you have any questions related to this note please do not hesitate to contact our office.

## 2020-06-16 ENCOUNTER — OFFICE VISIT (OUTPATIENT)
Dept: ENDOCRINOLOGY | Facility: MEDICAL CENTER | Age: 65
End: 2020-06-16
Payer: MEDICARE

## 2020-06-16 VITALS
HEART RATE: 95 BPM | DIASTOLIC BLOOD PRESSURE: 60 MMHG | BODY MASS INDEX: 31.34 KG/M2 | WEIGHT: 195 LBS | HEIGHT: 66 IN | OXYGEN SATURATION: 95 % | SYSTOLIC BLOOD PRESSURE: 112 MMHG

## 2020-06-16 DIAGNOSIS — E78.5 DYSLIPIDEMIA: ICD-10-CM

## 2020-06-16 DIAGNOSIS — E55.9 VITAMIN D DEFICIENCY: ICD-10-CM

## 2020-06-16 DIAGNOSIS — E03.9 ACQUIRED HYPOTHYROIDISM: ICD-10-CM

## 2020-06-16 DIAGNOSIS — E11.65 UNCONTROLLED TYPE 2 DIABETES MELLITUS WITH HYPERGLYCEMIA (HCC): ICD-10-CM

## 2020-06-16 DIAGNOSIS — E53.8 VITAMIN B 12 DEFICIENCY: ICD-10-CM

## 2020-06-16 LAB
HBA1C MFR BLD: 7.2 % (ref 0–5.6)
INT CON NEG: ABNORMAL

## 2020-06-16 PROCEDURE — 83036 HEMOGLOBIN GLYCOSYLATED A1C: CPT | Performed by: INTERNAL MEDICINE

## 2020-06-16 PROCEDURE — 99214 OFFICE O/P EST MOD 30 MIN: CPT | Performed by: INTERNAL MEDICINE

## 2020-06-16 RX ORDER — EMPAGLIFLOZIN 10 MG/1
1 TABLET, FILM COATED ORAL DAILY
Qty: 90 TAB | Refills: 3 | Status: SHIPPED | OUTPATIENT
Start: 2020-06-16 | End: 2020-06-19 | Stop reason: SDUPTHER

## 2020-06-19 ENCOUNTER — HOSPITAL ENCOUNTER (OUTPATIENT)
Dept: LAB | Facility: MEDICAL CENTER | Age: 65
End: 2020-06-19
Attending: INTERNAL MEDICINE
Payer: MEDICARE

## 2020-06-19 ENCOUNTER — HOSPITAL ENCOUNTER (OUTPATIENT)
Dept: LAB | Facility: MEDICAL CENTER | Age: 65
End: 2020-06-19
Attending: NURSE PRACTITIONER
Payer: MEDICARE

## 2020-06-19 DIAGNOSIS — Z11.59 ENCOUNTER FOR HEPATITIS C SCREENING TEST FOR LOW RISK PATIENT: ICD-10-CM

## 2020-06-19 DIAGNOSIS — E11.65 UNCONTROLLED TYPE 2 DIABETES MELLITUS WITH HYPERGLYCEMIA (HCC): ICD-10-CM

## 2020-06-19 DIAGNOSIS — E78.5 DYSLIPIDEMIA: ICD-10-CM

## 2020-06-19 DIAGNOSIS — E03.9 ACQUIRED HYPOTHYROIDISM: ICD-10-CM

## 2020-06-19 DIAGNOSIS — Z12.5 ENCOUNTER FOR SCREENING FOR MALIGNANT NEOPLASM OF PROSTATE: ICD-10-CM

## 2020-06-19 DIAGNOSIS — E78.5 DYSLIPIDEMIA: Chronic | ICD-10-CM

## 2020-06-19 DIAGNOSIS — E11.41 TYPE 2 DIABETES MELLITUS WITH DIABETIC MONONEUROPATHY, WITHOUT LONG-TERM CURRENT USE OF INSULIN (HCC): ICD-10-CM

## 2020-06-19 DIAGNOSIS — E03.4 HYPOTHYROIDISM DUE TO ACQUIRED ATROPHY OF THYROID: Chronic | ICD-10-CM

## 2020-06-19 DIAGNOSIS — E55.9 VITAMIN D DEFICIENCY: ICD-10-CM

## 2020-06-19 DIAGNOSIS — E53.8 VITAMIN B 12 DEFICIENCY: ICD-10-CM

## 2020-06-19 LAB
25(OH)D3 SERPL-MCNC: 49 NG/ML (ref 30–100)
ALBUMIN SERPL BCP-MCNC: 4.7 G/DL (ref 3.2–4.9)
ALBUMIN/GLOB SERPL: 1.7 G/DL
ALP SERPL-CCNC: 57 U/L (ref 30–99)
ALT SERPL-CCNC: 17 U/L (ref 2–50)
ANION GAP SERPL CALC-SCNC: 17 MMOL/L (ref 7–16)
ANION GAP SERPL CALC-SCNC: 19 MMOL/L (ref 7–16)
AST SERPL-CCNC: 15 U/L (ref 12–45)
BILIRUB SERPL-MCNC: 0.5 MG/DL (ref 0.1–1.5)
BUN SERPL-MCNC: 20 MG/DL (ref 8–22)
BUN SERPL-MCNC: 20 MG/DL (ref 8–22)
CALCIUM SERPL-MCNC: 9.7 MG/DL (ref 8.5–10.5)
CALCIUM SERPL-MCNC: 9.8 MG/DL (ref 8.5–10.5)
CHLORIDE SERPL-SCNC: 101 MMOL/L (ref 96–112)
CHLORIDE SERPL-SCNC: 102 MMOL/L (ref 96–112)
CHOLEST SERPL-MCNC: 152 MG/DL (ref 100–199)
CHOLEST SERPL-MCNC: 155 MG/DL (ref 100–199)
CO2 SERPL-SCNC: 19 MMOL/L (ref 20–33)
CO2 SERPL-SCNC: 20 MMOL/L (ref 20–33)
CREAT SERPL-MCNC: 1.13 MG/DL (ref 0.5–1.4)
CREAT SERPL-MCNC: 1.19 MG/DL (ref 0.5–1.4)
CREAT UR-MCNC: 91.67 MG/DL
CREAT UR-MCNC: 93.27 MG/DL
ERYTHROCYTE [DISTWIDTH] IN BLOOD BY AUTOMATED COUNT: 50.2 FL (ref 35.9–50)
EST. AVERAGE GLUCOSE BLD GHB EST-MCNC: 154 MG/DL
FASTING STATUS PATIENT QL REPORTED: NORMAL
FASTING STATUS PATIENT QL REPORTED: NORMAL
GLOBULIN SER CALC-MCNC: 2.8 G/DL (ref 1.9–3.5)
GLUCOSE SERPL-MCNC: 131 MG/DL (ref 65–99)
GLUCOSE SERPL-MCNC: 136 MG/DL (ref 65–99)
HBA1C MFR BLD: 7 % (ref 0–5.6)
HCT VFR BLD AUTO: 48.6 % (ref 42–52)
HCV AB SER QL: NORMAL
HDLC SERPL-MCNC: 34 MG/DL
HDLC SERPL-MCNC: 34 MG/DL
HGB BLD-MCNC: 16.1 G/DL (ref 14–18)
LDLC SERPL CALC-MCNC: ABNORMAL MG/DL
LDLC SERPL CALC-MCNC: ABNORMAL MG/DL
MCH RBC QN AUTO: 31.3 PG (ref 27–33)
MCHC RBC AUTO-ENTMCNC: 33.1 G/DL (ref 33.7–35.3)
MCV RBC AUTO: 94.6 FL (ref 81.4–97.8)
MICROALBUMIN UR-MCNC: <1.2 MG/DL
MICROALBUMIN UR-MCNC: <1.2 MG/DL
MICROALBUMIN/CREAT UR: NORMAL MG/G (ref 0–30)
MICROALBUMIN/CREAT UR: NORMAL MG/G (ref 0–30)
PLATELET # BLD AUTO: 169 K/UL (ref 164–446)
PMV BLD AUTO: 11.4 FL (ref 9–12.9)
POTASSIUM SERPL-SCNC: 4.2 MMOL/L (ref 3.6–5.5)
POTASSIUM SERPL-SCNC: 4.6 MMOL/L (ref 3.6–5.5)
PROT SERPL-MCNC: 7.5 G/DL (ref 6–8.2)
PSA SERPL-MCNC: 2.39 NG/ML (ref 0–4)
RBC # BLD AUTO: 5.14 M/UL (ref 4.7–6.1)
SODIUM SERPL-SCNC: 138 MMOL/L (ref 135–145)
SODIUM SERPL-SCNC: 140 MMOL/L (ref 135–145)
T3 SERPL-MCNC: 82.5 NG/DL (ref 60–181)
T3FREE SERPL-MCNC: 2.3 PG/ML (ref 2–4.4)
T4 FREE SERPL-MCNC: 1.18 NG/DL (ref 0.93–1.7)
THYROPEROXIDASE AB SERPL-ACNC: 72 IU/ML (ref 0–9)
TRIGL SERPL-MCNC: 428 MG/DL (ref 0–149)
TRIGL SERPL-MCNC: 429 MG/DL (ref 0–149)
TSH SERPL DL<=0.005 MIU/L-ACNC: 1.21 UIU/ML (ref 0.38–5.33)
VIT B12 SERPL-MCNC: <150 PG/ML (ref 211–911)
WBC # BLD AUTO: 7.5 K/UL (ref 4.8–10.8)

## 2020-06-19 PROCEDURE — 86376 MICROSOMAL ANTIBODY EACH: CPT

## 2020-06-19 PROCEDURE — 86803 HEPATITIS C AB TEST: CPT

## 2020-06-19 PROCEDURE — 85027 COMPLETE CBC AUTOMATED: CPT

## 2020-06-19 PROCEDURE — 82607 VITAMIN B-12: CPT

## 2020-06-19 PROCEDURE — 82043 UR ALBUMIN QUANTITATIVE: CPT

## 2020-06-19 PROCEDURE — 80061 LIPID PANEL: CPT | Mod: 91

## 2020-06-19 PROCEDURE — 80048 BASIC METABOLIC PNL TOTAL CA: CPT

## 2020-06-19 PROCEDURE — 82043 UR ALBUMIN QUANTITATIVE: CPT | Mod: 91

## 2020-06-19 PROCEDURE — 83036 HEMOGLOBIN GLYCOSYLATED A1C: CPT

## 2020-06-19 PROCEDURE — 82306 VITAMIN D 25 HYDROXY: CPT

## 2020-06-19 PROCEDURE — 84153 ASSAY OF PSA TOTAL: CPT

## 2020-06-19 PROCEDURE — 84443 ASSAY THYROID STIM HORMONE: CPT

## 2020-06-19 PROCEDURE — 82570 ASSAY OF URINE CREATININE: CPT | Mod: 91

## 2020-06-19 PROCEDURE — 84480 ASSAY TRIIODOTHYRONINE (T3): CPT

## 2020-06-19 PROCEDURE — 82570 ASSAY OF URINE CREATININE: CPT

## 2020-06-19 PROCEDURE — 80053 COMPREHEN METABOLIC PANEL: CPT

## 2020-06-19 PROCEDURE — 84439 ASSAY OF FREE THYROXINE: CPT

## 2020-06-19 PROCEDURE — 80061 LIPID PANEL: CPT

## 2020-06-19 PROCEDURE — 84481 FREE ASSAY (FT-3): CPT

## 2020-06-19 PROCEDURE — 36415 COLL VENOUS BLD VENIPUNCTURE: CPT

## 2020-06-19 RX ORDER — EMPAGLIFLOZIN 10 MG/1
1 TABLET, FILM COATED ORAL DAILY
Qty: 100 TAB | Refills: 3 | Status: SHIPPED | OUTPATIENT
Start: 2020-06-19 | End: 2020-11-10 | Stop reason: SDUPTHER

## 2020-07-10 ENCOUNTER — TELEPHONE (OUTPATIENT)
Dept: SCHEDULING | Facility: IMAGING CENTER | Age: 65
End: 2020-07-10

## 2020-07-10 DIAGNOSIS — Z12.11 SCREEN FOR COLON CANCER: ICD-10-CM

## 2020-07-24 DIAGNOSIS — E11.65 UNCONTROLLED TYPE 2 DIABETES MELLITUS WITH HYPERGLYCEMIA (HCC): ICD-10-CM

## 2020-07-24 RX ORDER — PIOGLITAZONEHYDROCHLORIDE 30 MG/1
TABLET ORAL
Qty: 100 TAB | Refills: 10 | Status: SHIPPED | OUTPATIENT
Start: 2020-07-24 | End: 2021-04-02

## 2020-07-24 NOTE — TELEPHONE ENCOUNTER
Received request via: Pharmacy    Was the patient seen in the last year in this department? Yes    Does the patient have an active prescription (recently filled or refills available) for medication(s) requested? No       pioglitazone (ACTOS) 30 MG Tab         Sig: TAKE ONE TABLET BY MOUTH DAILY

## 2020-08-10 DIAGNOSIS — E11.65 UNCONTROLLED TYPE 2 DIABETES MELLITUS WITH HYPERGLYCEMIA (HCC): ICD-10-CM

## 2020-08-10 RX ORDER — SEMAGLUTIDE 1.34 MG/ML
1 INJECTION, SOLUTION SUBCUTANEOUS
Qty: 7 PEN | Refills: 2 | Status: SHIPPED | OUTPATIENT
Start: 2020-08-10 | End: 2021-10-11

## 2020-11-02 ENCOUNTER — NON-PROVIDER VISIT (OUTPATIENT)
Dept: ENDOCRINOLOGY | Facility: MEDICAL CENTER | Age: 65
End: 2020-11-02
Attending: INTERNAL MEDICINE
Payer: MEDICARE

## 2020-11-02 VITALS
SYSTOLIC BLOOD PRESSURE: 96 MMHG | BODY MASS INDEX: 29.25 KG/M2 | WEIGHT: 182 LBS | DIASTOLIC BLOOD PRESSURE: 78 MMHG | HEIGHT: 66 IN | OXYGEN SATURATION: 99 % | HEART RATE: 85 BPM

## 2020-11-02 DIAGNOSIS — E03.9 ACQUIRED HYPOTHYROIDISM: ICD-10-CM

## 2020-11-02 DIAGNOSIS — E55.9 VITAMIN D DEFICIENCY: ICD-10-CM

## 2020-11-02 DIAGNOSIS — E53.8 VITAMIN B 12 DEFICIENCY: ICD-10-CM

## 2020-11-02 DIAGNOSIS — E78.1 HYPERTRIGLYCERIDEMIA: ICD-10-CM

## 2020-11-02 DIAGNOSIS — E78.5 DYSLIPIDEMIA: ICD-10-CM

## 2020-11-02 DIAGNOSIS — E11.65 UNCONTROLLED TYPE 2 DIABETES MELLITUS WITH HYPERGLYCEMIA (HCC): ICD-10-CM

## 2020-11-02 LAB
HBA1C MFR BLD: 6.5 % (ref 0–5.6)
INT CON NEG: ABNORMAL
INT CON POS: ABNORMAL

## 2020-11-02 PROCEDURE — 99214 OFFICE O/P EST MOD 30 MIN: CPT | Performed by: INTERNAL MEDICINE

## 2020-11-02 PROCEDURE — 99212 OFFICE O/P EST SF 10 MIN: CPT | Performed by: INTERNAL MEDICINE

## 2020-11-02 PROCEDURE — 83036 HEMOGLOBIN GLYCOSYLATED A1C: CPT

## 2020-11-02 ASSESSMENT — FIBROSIS 4 INDEX: FIB4 SCORE: 1.42

## 2020-11-02 NOTE — PROGRESS NOTES
CHIEF COMPLAINT: Patient is here for follow up of Type 2 Diabetes Mellitus    HPI:     Kristian Frank is a 65 y.o. male with Type 2 Diabetes Mellitus, dyslipidemia, hypothyroidism, vitamin D deficiency here for follow up.    Labs from 11/2/2020 HbA1c is 6.5%    Diabetes regimen:  Ozempic 1 mg weekly  Metformin 1 g twice daily  Jardiance 10 mg daily  Actos 30 mg daily  Patient is tolerating current regimen well, no episodes of hypoglycemia.    BG Diary:11/02/20  Breakfast: Fasting 100s to 110s.    Weight has been stable, has decreased 13 pounds over last 5 months    Diabetes Complications   Retinopathy: No known retinopathy.  Last eye exam: February 2020  Neuropathy: Denies paresthesias or numbness in hands or feet. Denies any foot wounds.  Exercise: Minimal.  Diet: Fair.  Patient's medications, allergies, and social histories were reviewed and updated as appropriate.    ROS:     CONS:     No fever, no chills   EYES:     No diplopia, no blurry vision   CV:           No chest pain, no palpitations   PULM:     No SOB, no cough, no hemoptysis.   GI:            No nausea, no vomiting, no diarrhea, no constipation   ENDO:     No polyuria, no polydipsia, no heat intolerance, no cold intolerance       Past Medical History:  Problem List:  2020-05: Obesity (BMI 30-39.9)  2019-05: Uncomplicated alcohol dependence ([facility])  2019-05: Essential hypertension  2018-06: Obesity (BMI 30-39.9)  2018-03: Carotid atherosclerosis  2018-01: Left cervical radiculopathy  2018-01: Left hand weakness  2016-04: Allergic rhinitis  2016-04: Post-nasal drip  2016-04: Bilateral low back pain without sciatica  2016-02: Abnormal EKG  2016-02: Cigarette nicotine dependence without complication  2016-02: Obesity (BMI 30.0-34.9)  2016-02: Epigastric abdominal pain  2016-02: Sleep apnea  2016-02: Tenosynovitis of wrist  2013-10: Type 2 diabetes mellitus with hemoglobin A1c goal of less   than 7.0% ([facility])  2012-12: Vitamin D deficiency  "disease  Dyslipidemia  DIABETES MELLITUS  Hypothyroid  Diverticulitis      Past Surgical History:  Past Surgical History:   Procedure Laterality Date   • APPENDECTOMY     • COLON RESECTION          Allergies:  Patient has no known allergies.     Social History:  Social History     Tobacco Use   • Smoking status: Current Every Day Smoker     Packs/day: 0.50   • Smokeless tobacco: Never Used   Substance Use Topics   • Alcohol use: Yes     Alcohol/week: 14.4 oz     Types: 24 Cans of beer per week   • Drug use: Yes     Types: Marijuana, Inhaled        Family History:   family history includes Cancer in his sister; Diabetes in his brother, mother, sister, and sister; Heart Attack in his father; Heart Disease in his father; Hyperlipidemia in his brother, father, mother, and sister; Hypertension in his brother, father, mother, and sister; Lung Disease in his sister.      PHYSICAL EXAM:   OBJECTIVE:  Vital signs: BP (!) 96/78   Pulse 85   Ht 1.676 m (5' 6\")   Wt 82.6 kg (182 lb)   SpO2 99%   BMI 29.38 kg/m²   GENERAL: Well-developed, well-nourished in no apparent distress.   EYE:  No ocular asymmetry, PERRLA  HENT: Pink, moist mucous membranes.    NECK: No thyromegaly.   CARDIOVASCULAR:  No murmurs  LUNGS: Clear breath sounds  ABDOMEN: Soft, nontender   EXTREMITIES: No clubbing, cyanosis, or edema.   NEUROLOGICAL: No gross focal motor abnormalities   LYMPH: No cervical adenopathy palpated.   SKIN: No rashes, lesions.     Labs:  Lab Results   Component Value Date/Time    HBA1C 6.5 (A) 11/02/2020 01:36 PM        Lab Results   Component Value Date/Time    WBC 7.5 06/19/2020 07:40 AM    RBC 5.14 06/19/2020 07:40 AM    HEMOGLOBIN 16.1 06/19/2020 07:40 AM    MCV 94.6 06/19/2020 07:40 AM    MCH 31.3 06/19/2020 07:40 AM    MCHC 33.1 (L) 06/19/2020 07:40 AM    RDW 50.2 (H) 06/19/2020 07:40 AM    MPV 11.4 06/19/2020 07:40 AM       Lab Results   Component Value Date/Time    SODIUM 140 06/19/2020 07:40 AM    SODIUM 138 06/19/2020 " 07:40 AM    POTASSIUM 4.6 06/19/2020 07:40 AM    POTASSIUM 4.2 06/19/2020 07:40 AM    CHLORIDE 102 06/19/2020 07:40 AM    CHLORIDE 101 06/19/2020 07:40 AM    CO2 19 (L) 06/19/2020 07:40 AM    CO2 20 06/19/2020 07:40 AM    ANION 19.0 (H) 06/19/2020 07:40 AM    ANION 17.0 (H) 06/19/2020 07:40 AM    GLUCOSE 131 (H) 06/19/2020 07:40 AM    GLUCOSE 136 (H) 06/19/2020 07:40 AM    BUN 20 06/19/2020 07:40 AM    BUN 20 06/19/2020 07:40 AM    CREATININE 1.19 06/19/2020 07:40 AM    CREATININE 1.13 06/19/2020 07:40 AM    CREATININE 0.93 03/22/2013 07:21 AM    CALCIUM 9.7 06/19/2020 07:40 AM    CALCIUM 9.8 06/19/2020 07:40 AM    ASTSGOT 15 06/19/2020 07:40 AM    ALTSGPT 17 06/19/2020 07:40 AM    TBILIRUBIN 0.5 06/19/2020 07:40 AM    ALBUMIN 4.7 06/19/2020 07:40 AM    TOTPROTEIN 7.5 06/19/2020 07:40 AM    GLOBULIN 2.8 06/19/2020 07:40 AM    AGRATIO 1.7 06/19/2020 07:40 AM       Lab Results   Component Value Date/Time    CHOLSTRLTOT 155 06/19/2020 0740    CHOLSTRLTOT 152 06/19/2020 0740    TRIGLYCERIDE 428 (H) 06/19/2020 0740    TRIGLYCERIDE 429 (H) 06/19/2020 0740    HDL 34 (A) 06/19/2020 0740    HDL 34 (A) 06/19/2020 0740    LDL see below 06/19/2020 0740    LDL see below 06/19/2020 0740       Lab Results   Component Value Date/Time    MICROALBCALC 5.9 10/16/2013 07:12 AM    MALBCRT see below 06/19/2020 07:40 AM    MALBCRT see below 06/19/2020 07:40 AM    MICROALBUR <1.2 06/19/2020 07:40 AM    MICROALBUR <1.2 06/19/2020 07:40 AM    MICRALB 7.4 10/16/2013 07:12 AM        Lab Results   Component Value Date/Time    TSHULTRASEN 1.210 06/19/2020 0740     No results found for: FREEDIR  Lab Results   Component Value Date/Time    FREET3 2.30 06/19/2020 0740     No results found for: THYSTIMIG        ASSESSMENT/PLAN:     1. Uncontrolled type 2 diabetes mellitus with hyperglycemia (HCC)  -Doing well on current regimen    2. Acquired hypothyroidism  -(+) TPO antibodies, biochemically euthyroid (6/2020)  -Monitor TSH, T4    3.  Hypertriglyceridemia  TGs: 429 (6/2020)  Now the A1c is under much better control it is unusual that his triglycerides are increasing.  On further history taking with patient he does admit to 2-3 beers every afternoon.  Did discuss cutting back on his alcohol intake to help with the triglycerides.  The elevated triglycerides coupled with his EtOH use puts him at a higher risk of pancreatitis.  Patient instructed to go to ED if any symptoms of severe persistent epigastric pain.  -Recheck lipid profile this week, if TGs greater than 500 we will likely need to start fenofibrate however patient is currently on simvastatin 40 mg daily -consider switching to rosuvastatin especially if fenofibrate is to be started so as to minimize adverse effects.    4. Vitamin B 12 deficiency  -B12 level <150 (6/2020), no anemia or macrocytosis  -Unclear etiology may be related to Metformin use and/or EtOH use.  -Start oral B12 supplementation  -Recheck B12 level    5. Dyslipidemia  -Continue simvastatin    6. Vitamin D deficiency disease  -Continue vitamin D supplementation    Return in about 3 months (around 2/2/2021).    Thank you kindly for allowing me to participate in the diabetes care plan for this patient.    I saw and examined the patient and discussed the management with the resident.    I reviewed the resident's note and agree with the documented findings and plan of care.    Additional attending comments:    We discussed with the patient that his triglycerides are very high but his last lab work was over 6 months ago so we will get another fasting lipid profile and update him.  We will start him on fenofibrate and adjust his simvastatin if he still has triglycerides over 400.  I discussed and reviewed the risk of pancreatitis with hypertriglyceridemia.  We will repeat his labs again in 3 months including his A1c        11/02/20    CC:   LEON Hernandez

## 2020-11-02 NOTE — PROGRESS NOTES
"RN-CDE Note    Subjective:   HPI  Krisitan is a 65 year old male with controlled type 2 diabetes, hypothyroid,  dyslipidemia and HTN.   Health changes since last visit/interval Hx: having some issues with his shoulder.     Diabetes Medications:   Ozempic 1 mg weekly  Metformin 1000 mg bid  Jardiance 10 mg daily  Thryoid medications  Synthroid 100 mcg daily,  first thing in the morning on empty stomach.     Taking above medications as prescribed: yes  Taking daily ASA: No        Exercise: moderate regular exercise, aerobic < 3 days a week  Diet: \"healthy\" diet  in general  Patient's body mass index is 29.38 kg/m². Exercise and nutrition counseling were performed at this visit.      Health Maintenance:   Health Maintenance Due   Topic Date Due   • IMM INFLUENZA (1) 09/01/2020         DM:   Last A1c:   Lab Results   Component Value Date/Time    HBA1C 6.5 (A) 11/02/2020 01:36 PM      A1C GOAL: < 7    Glucose monitoring frequency: testing blood sugars a couple times per week.  Most blood sugars are fasting and in the 100-120 range.    Hypoglycemic episodes: no    Last Retinal Exam: on file and up-to-date  Daily Foot Exam: Yes denies any problems.       Lab Results   Component Value Date/Time    MICROALBCALC 5.9 10/16/2013 07:12 AM    MALBCRT see below 06/19/2020 07:40 AM    MALBCRT see below 06/19/2020 07:40 AM    MICROALBUR <1.2 06/19/2020 07:40 AM    MICROALBUR <1.2 06/19/2020 07:40 AM    MICRALB 7.4 10/16/2013 07:12 AM        ACR Albumin/Creatinine Ratio goal <30     HTN:   Blood pressure goal <140/<80 at goal.   Currently Rx ACE/ARB: No    Dyslipidemia:    Lab Results   Component Value Date/Time    CHOLSTRLTOT 155 06/19/2020 07:40 AM    CHOLSTRLTOT 152 06/19/2020 07:40 AM    LDL see below 06/19/2020 07:40 AM    LDL see below 06/19/2020 07:40 AM    HDL 34 (A) 06/19/2020 07:40 AM    HDL 34 (A) 06/19/2020 07:40 AM    TRIGLYCERIDE 428 (H) 06/19/2020 07:40 AM    TRIGLYCERIDE 429 (H) 06/19/2020 07:40 AM       Lab Results "   Component Value Date/Time    SODIUM 140 06/19/2020 07:40 AM    SODIUM 138 06/19/2020 07:40 AM    POTASSIUM 4.6 06/19/2020 07:40 AM    POTASSIUM 4.2 06/19/2020 07:40 AM    CHLORIDE 102 06/19/2020 07:40 AM    CHLORIDE 101 06/19/2020 07:40 AM    CO2 19 (L) 06/19/2020 07:40 AM    CO2 20 06/19/2020 07:40 AM    GLUCOSE 131 (H) 06/19/2020 07:40 AM    GLUCOSE 136 (H) 06/19/2020 07:40 AM    BUN 20 06/19/2020 07:40 AM    BUN 20 06/19/2020 07:40 AM    CREATININE 1.19 06/19/2020 07:40 AM    CREATININE 1.13 06/19/2020 07:40 AM    CREATININE 0.93 03/22/2013 07:21 AM    BUNCREATRAT 10 12/30/2014 08:33 AM    BUNCREATRAT 14 03/22/2013 07:21 AM    GLOMRATE >59 08/07/2009 09:05 AM     Lab Results   Component Value Date/Time    ALKPHOSPHAT 57 06/19/2020 07:40 AM    ASTSGOT 15 06/19/2020 07:40 AM    ALTSGPT 17 06/19/2020 07:40 AM    TBILIRUBIN 0.5 06/19/2020 07:40 AM        Currently Rx Statin: Yes    He  reports that he has been smoking. He has been smoking about 0.50 packs per day. He has never used smokeless tobacco.    Objective:     Exam:  Monofilament: not done    Plan:     Discussed and educated on:   - All medications, side effects and compliance (discussed carefully)  - Annual eye examinations at Ophthalmology  - Foot Care: what to look for when checking feet every day  - HbA1C: target  - Home glucose monitoring emphasized  - Weight control and daily exercise    Recommended medication changes: none at this time.

## 2020-11-04 ENCOUNTER — HOSPITAL ENCOUNTER (OUTPATIENT)
Dept: LAB | Facility: MEDICAL CENTER | Age: 65
End: 2020-11-04
Attending: STUDENT IN AN ORGANIZED HEALTH CARE EDUCATION/TRAINING PROGRAM
Payer: MEDICARE

## 2020-11-04 DIAGNOSIS — E78.5 DYSLIPIDEMIA: ICD-10-CM

## 2020-11-04 DIAGNOSIS — E78.1 HYPERTRIGLYCERIDEMIA: ICD-10-CM

## 2020-11-04 LAB
CHOLEST SERPL-MCNC: 131 MG/DL (ref 100–199)
FASTING STATUS PATIENT QL REPORTED: NORMAL
HDLC SERPL-MCNC: 46 MG/DL
LDLC SERPL CALC-MCNC: 63 MG/DL
TRIGL SERPL-MCNC: 109 MG/DL (ref 0–149)

## 2020-11-04 PROCEDURE — 36415 COLL VENOUS BLD VENIPUNCTURE: CPT

## 2020-11-04 PROCEDURE — 80061 LIPID PANEL: CPT

## 2020-11-10 DIAGNOSIS — E11.65 UNCONTROLLED TYPE 2 DIABETES MELLITUS WITH HYPERGLYCEMIA (HCC): ICD-10-CM

## 2020-11-10 RX ORDER — EMPAGLIFLOZIN 10 MG/1
1 TABLET, FILM COATED ORAL DAILY
Qty: 100 TAB | Refills: 3 | Status: SHIPPED | OUTPATIENT
Start: 2020-11-10 | End: 2021-12-17

## 2020-11-20 ENCOUNTER — NON-PROVIDER VISIT (OUTPATIENT)
Dept: MEDICAL GROUP | Facility: MEDICAL CENTER | Age: 65
End: 2020-11-20
Payer: MEDICARE

## 2020-11-20 DIAGNOSIS — Z23 NEED FOR VACCINATION: ICD-10-CM

## 2020-11-20 PROCEDURE — G0008 ADMIN INFLUENZA VIRUS VAC: HCPCS | Performed by: NURSE PRACTITIONER

## 2020-11-20 PROCEDURE — 90662 IIV NO PRSV INCREASED AG IM: CPT | Performed by: NURSE PRACTITIONER

## 2020-11-20 NOTE — PROGRESS NOTES
"Kristian Frank is a 65 y.o. male here for a non-provider visit for:   FLU    Reason for immunization: Annual Flu Vaccine  Immunization records indicate need for vaccine: Yes, confirmed with Epic  Minimum interval has been met for this vaccine: Yes  ABN completed: Not Indicated    Order and dose verified by: WON  VIS Dated  7/2019 was given to patient: Yes  All IAC Questionnaire questions were answered \"No.\"    Patient tolerated injection and no adverse effects were observed or reported: Yes      "

## 2020-11-24 RX ORDER — BLOOD SUGAR DIAGNOSTIC
STRIP MISCELLANEOUS
Qty: 100 STRIP | Refills: 0 | Status: SHIPPED | OUTPATIENT
Start: 2020-11-24

## 2020-11-24 NOTE — TELEPHONE ENCOUNTER
Received request via: Pharmacy    Was the patient seen in the last year in this department? Yes  5/26/2020  Does the patient have an active prescription (recently filled or refills available) for medication(s) requested? No

## 2021-02-03 ENCOUNTER — HOSPITAL ENCOUNTER (OUTPATIENT)
Dept: LAB | Facility: MEDICAL CENTER | Age: 66
End: 2021-02-03
Attending: INTERNAL MEDICINE
Payer: MEDICARE

## 2021-02-03 DIAGNOSIS — E78.5 DYSLIPIDEMIA: ICD-10-CM

## 2021-02-03 DIAGNOSIS — E03.9 ACQUIRED HYPOTHYROIDISM: ICD-10-CM

## 2021-02-03 DIAGNOSIS — E55.9 VITAMIN D DEFICIENCY: ICD-10-CM

## 2021-02-03 DIAGNOSIS — E11.65 UNCONTROLLED TYPE 2 DIABETES MELLITUS WITH HYPERGLYCEMIA (HCC): ICD-10-CM

## 2021-02-03 LAB
25(OH)D3 SERPL-MCNC: 78 NG/ML (ref 30–100)
ALBUMIN SERPL BCP-MCNC: 4.3 G/DL (ref 3.2–4.9)
ALBUMIN/GLOB SERPL: 1.8 G/DL
ALP SERPL-CCNC: 47 U/L (ref 30–99)
ALT SERPL-CCNC: 16 U/L (ref 2–50)
ANION GAP SERPL CALC-SCNC: 8 MMOL/L (ref 7–16)
AST SERPL-CCNC: 13 U/L (ref 12–45)
BILIRUB SERPL-MCNC: 0.3 MG/DL (ref 0.1–1.5)
BUN SERPL-MCNC: 14 MG/DL (ref 8–22)
CALCIUM SERPL-MCNC: 9.2 MG/DL (ref 8.5–10.5)
CHLORIDE SERPL-SCNC: 102 MMOL/L (ref 96–112)
CHOLEST SERPL-MCNC: 134 MG/DL (ref 100–199)
CO2 SERPL-SCNC: 26 MMOL/L (ref 20–33)
CREAT SERPL-MCNC: 0.89 MG/DL (ref 0.5–1.4)
EST. AVERAGE GLUCOSE BLD GHB EST-MCNC: 151 MG/DL
FASTING STATUS PATIENT QL REPORTED: NORMAL
GLOBULIN SER CALC-MCNC: 2.4 G/DL (ref 1.9–3.5)
GLUCOSE SERPL-MCNC: 122 MG/DL (ref 65–99)
HBA1C MFR BLD: 6.9 % (ref 0–5.6)
HDLC SERPL-MCNC: 41 MG/DL
LDLC SERPL CALC-MCNC: 72 MG/DL
POTASSIUM SERPL-SCNC: 4.4 MMOL/L (ref 3.6–5.5)
PROT SERPL-MCNC: 6.7 G/DL (ref 6–8.2)
SODIUM SERPL-SCNC: 136 MMOL/L (ref 135–145)
T4 FREE SERPL-MCNC: 1.84 NG/DL (ref 0.93–1.7)
TRIGL SERPL-MCNC: 105 MG/DL (ref 0–149)
TSH SERPL DL<=0.005 MIU/L-ACNC: 0.14 UIU/ML (ref 0.38–5.33)

## 2021-02-03 PROCEDURE — 84439 ASSAY OF FREE THYROXINE: CPT

## 2021-02-03 PROCEDURE — 36415 COLL VENOUS BLD VENIPUNCTURE: CPT

## 2021-02-03 PROCEDURE — 84443 ASSAY THYROID STIM HORMONE: CPT

## 2021-02-03 PROCEDURE — 82306 VITAMIN D 25 HYDROXY: CPT

## 2021-02-03 PROCEDURE — 80053 COMPREHEN METABOLIC PANEL: CPT

## 2021-02-03 PROCEDURE — 80061 LIPID PANEL: CPT

## 2021-02-03 PROCEDURE — 83036 HEMOGLOBIN GLYCOSYLATED A1C: CPT

## 2021-02-10 ENCOUNTER — OFFICE VISIT (OUTPATIENT)
Dept: ENDOCRINOLOGY | Facility: MEDICAL CENTER | Age: 66
End: 2021-02-10
Attending: NURSE PRACTITIONER
Payer: MEDICARE

## 2021-02-10 VITALS
WEIGHT: 180 LBS | BODY MASS INDEX: 28.93 KG/M2 | HEART RATE: 91 BPM | HEIGHT: 66 IN | OXYGEN SATURATION: 90 % | TEMPERATURE: 97.8 F | SYSTOLIC BLOOD PRESSURE: 98 MMHG | DIASTOLIC BLOOD PRESSURE: 90 MMHG

## 2021-02-10 DIAGNOSIS — E11.9 TYPE 2 DIABETES MELLITUS WITHOUT COMPLICATION, WITHOUT LONG-TERM CURRENT USE OF INSULIN (HCC): ICD-10-CM

## 2021-02-10 DIAGNOSIS — E55.9 VITAMIN D DEFICIENCY: ICD-10-CM

## 2021-02-10 DIAGNOSIS — E03.9 ACQUIRED HYPOTHYROIDISM: ICD-10-CM

## 2021-02-10 DIAGNOSIS — E78.5 DYSLIPIDEMIA: ICD-10-CM

## 2021-02-10 PROCEDURE — 99214 OFFICE O/P EST MOD 30 MIN: CPT | Performed by: NURSE PRACTITIONER

## 2021-02-10 PROCEDURE — 99211 OFF/OP EST MAY X REQ PHY/QHP: CPT | Performed by: NURSE PRACTITIONER

## 2021-02-10 ASSESSMENT — PATIENT HEALTH QUESTIONNAIRE - PHQ9: CLINICAL INTERPRETATION OF PHQ2 SCORE: 0

## 2021-02-10 ASSESSMENT — FIBROSIS 4 INDEX: FIB4 SCORE: 1.269230769230769231

## 2021-02-10 NOTE — PROGRESS NOTES
CHIEF COMPLAINT: Patient is here for follow up of Type 2 Diabetes Mellitus, Hypothyroidism, Dyslipidemia & Vitamin D Deficiency.     Previously seen by Dr. Mcdonald.    HPI:     Kristian Frank is a 65 y.o. male with for continued evaluation & treatment of the followin. Type 2 Diabetes Mellitus   Current Diabetes Regimen:  GLP-1 Agent: Ozempic 1 mg daily  SGLT2-Jardiance 10mg daily  Metformin  mg 2 BID  Other: Actos 30 mg daily.     Hypoglycemia:  None     Ref. Range 2/3/2021 06:59   Glycohemoglobin Latest Ref Range: 0.0 - 5.6 % 6.9 (H)       BG Diary:02/10/21 Checks 1-2 times per week.  Before Breakfast: 120-125    Weight down 5 pounds from previous appointment.     Diabetes Complications   Retinopathy: No known retinopathy.  Last eye exam: has appointment 2021  Neuropathy: R foot numbness in feet r/t low back injury and pain.  Denies any foot wounds.  Exercise: Avid hiker and Cross Country Skier  Diet: Fair.    2. Hypothyroidism   Currently taking Synthroid 200mcg x 5 days. Pt has been doing this schedule for years. Adequate compliance and he takes thyroid hormone on empty stomach 45 minutes prior to breakfast.   Denies heart palpitations, tremor and/or anxiousness.      Ref. Range 2/3/2021 06:59   TSH Latest Ref Range: 0.380 - 5.330 uIU/mL 0.140 (L)   Free T-4 Latest Ref Range: 0.93 - 1.70 ng/dL 1.84 (H)       3. Dyslipidemia   Simvastatin 40mg QD.   Denies muscle weakness/fatigue.    Ref. Range 2/3/2021 06:59   Cholesterol,Tot Latest Ref Range: 100 - 199 mg/dL 134   Triglycerides Latest Ref Range: 0 - 149 mg/dL 105   HDL Latest Ref Range: >=40 mg/dL 41   LDL Latest Ref Range: <100 mg/dL 72       4. Vitamin D Deficiency  Currently taking Vitamin D 1000IU daily.      Ref. Range 2/3/2021 06:59   25-Hydroxy   Vitamin D 25 Latest Ref Range: 30 - 100 ng/mL 78       Patient's medications, allergies, and social histories were reviewed and updated as appropriate.    ROS:     CONS:     No fever, no  chills   EYES:     No diplopia, no blurry vision   CV:           No chest pain, no palpitations   PULM:     No SOB, no cough, no hemoptysis.   GI:            No nausea, no vomiting, no diarrhea, no constipation   ENDO:     No polyuria, no polydipsia, no heat intolerance, no cold intolerance       Past Medical History:  Problem List:  2020-05: Obesity (BMI 30-39.9)  2019-05: Uncomplicated alcohol dependence ([facility])  2019-05: Essential hypertension  2018-06: Obesity (BMI 30-39.9)  2018-03: Carotid atherosclerosis  2018-01: Left cervical radiculopathy  2018-01: Left hand weakness  2016-04: Allergic rhinitis  2016-04: Post-nasal drip  2016-04: Bilateral low back pain without sciatica  2016-02: Abnormal EKG  2016-02: Cigarette nicotine dependence without complication  2016-02: Obesity (BMI 30.0-34.9)  2016-02: Epigastric abdominal pain  2016-02: Sleep apnea  2016-02: Tenosynovitis of wrist  2013-10: Type 2 diabetes mellitus with hemoglobin A1c goal of less   than 7.0% ([facility])  2012-12: Vitamin D deficiency disease  Dyslipidemia  DIABETES MELLITUS  Hypothyroid  Diverticulitis      Past Surgical History:  Past Surgical History:   Procedure Laterality Date   • APPENDECTOMY     • COLON RESECTION          Allergies:  Patient has no known allergies.     Social History:  Social History     Tobacco Use   • Smoking status: Current Every Day Smoker     Packs/day: 0.50   • Smokeless tobacco: Never Used   Substance Use Topics   • Alcohol use: Yes     Alcohol/week: 14.4 oz     Types: 24 Cans of beer per week   • Drug use: Yes     Types: Marijuana, Inhaled        Family History:   family history includes Cancer in his sister; Diabetes in his brother, mother, sister, and sister; Heart Attack in his father; Heart Disease in his father; Hyperlipidemia in his brother, father, mother, and sister; Hypertension in his brother, father, mother, and sister; Lung Disease in his sister.      PHYSICAL EXAM:   OBJECTIVE:  Vital signs: BP (!) 98/90 (BP  "Location: Left arm, Patient Position: Sitting, BP Cuff Size: Large adult)   Pulse 91   Temp 36.6 °C (97.8 °F) (Temporal)   Ht 1.676 m (5' 6\")   Wt 81.6 kg (180 lb)   SpO2 90%   BMI 29.05 kg/m²   GENERAL: Well-developed, well-nourished in no apparent distress.   EYE:  No ocular asymmetry, PERRLA  HENT: Pink, moist mucous membranes.    NECK: No thyromegaly.   CARDIOVASCULAR:  No murmurs  LUNGS: Clear breath sounds  ABDOMEN: Soft, nontender   EXTREMITIES: No clubbing, cyanosis, or edema.   NEUROLOGICAL: No gross focal motor abnormalities   LYMPH: No cervical adenopathy palpated.   SKIN: No rashes, lesions.       ASSESSMENT/PLAN:     1. Type 2 diabetes mellitus without complication, without long-term current use of insulin (HCC)  Stable  Continue the following regimen:   GLP-1 Agent: Ozempic 1 mg daily  SGLT2-Jardiance 10mg daily  Metformin  mg 2 BID  Other: Actos 30 mg daily.    Continue twice weekly glucose checks.   Recommend 2L-3L of water daily.   Continue to exercise 150 minutes per week.   Continue daily foot inspection.     2. Acquired hypothyroidism  Stable  Continue taking Synthroid 200mcg 5 days per week.   Repeat labs before next appointment.   Will re evaluate to assess if hormone needs to be decreased.     3. Dyslipidemia  Stable  Continue taking Simvastatin 40mg QD.    4. Vitamin D deficiency  Stable  Continue taking Vitamin D 1000IU QD.    Repeat labs 1 week prior to follow up appointment.   Follow up appointment in 3 months.      Thank you kindly for allowing me to participate in the diabetes care plan for this patient.    Ashley Osborne, APRN  02/10/21    CC:   LEON Hernandez  "

## 2021-02-10 NOTE — PROGRESS NOTES
"RN-CDE Note    Subjective:   Endocrinology Clinic Progress Note  PCP: LEON Hernandez    HPI:  Kristian Frank is a 65 y.o. old patient who is seen today for review of his type 2 diabetes and hypothyroid.   Kristian is currently on Synthroid 200 mcg per day, first thing in the morning on empty stomach.      Ref. Range 2/3/2021 06:59   TSH Latest Ref Range: 0.380 - 5.330 uIU/mL 0.140 (L)   Free T-4 Latest Ref Range: 0.93 - 1.70 ng/dL 1.84 (H)       DM:   Last A1c:   Lab Results   Component Value Date/Time    HBA1C 6.9 (H) 02/03/2021 06:59 AM      Previous A1c was 6.5 on 11/2/2020  A1C GOAL: < 7    Diabetes Medications:   Ozempic 1 mg weekly  Pioglitazone 30 mg daily  Metformin 1000 mg bid  Jardiance 10 mg daily  Taking above medications as prescribed: yes  Taking daily ASA: No    Exercise: walking about 3 miles daily  Diet: \"healthy\" diet  in general  Patient's body mass index is 29.05 kg/m². Exercise and nutrition counseling were performed at this visit.    Glucose monitoring frequency: testing a couple times a week.   Blood sugars usually 120-125 range fasting   Hypoglycemic episodes: no  Last Retinal Exam: states he has appointment this month  Daily Foot Exam: Yes, complains of some numbness in his right foot as a result of an injury.     Lab Results   Component Value Date/Time    MICROALBCALC 5.9 10/16/2013 07:12 AM    MALBCRT see below 06/19/2020 07:40 AM    MALBCRT see below 06/19/2020 07:40 AM    MICROALBUR <1.2 06/19/2020 07:40 AM    MICROALBUR <1.2 06/19/2020 07:40 AM    MICRALB 7.4 10/16/2013 07:12 AM      ACR Albumin/Creatinine Ratio goal <30   Currently Rx ACE/ARB: No   Dyslipidemia:  Lab Results   Component Value Date/Time    CHOLSTRLTOT 134 02/03/2021 06:59 AM    LDL 72 02/03/2021 06:59 AM    HDL 41 02/03/2021 06:59 AM    TRIGLYCERIDE 105 02/03/2021 06:59 AM       Currently Rx Statin: Yes  Simvastatin 40 mg daily    He  reports that he has been smoking. He has been smoking about 0.50 " packs per day. He has never used smokeless tobacco.      Plan:     Discussed and educated on:   - Discussed diabetic diet, encouraged portion control.   - Discussed importance of testing blood sugars and keeping logs.   - Discussed importance of daily exercise, recommended 30 minutes per day  - Reviewed medications and advised how to take.  - Discussed importance of immunizations and yearly eye exams.   - Advised daily foot  exams. Educated on signs of infection.   - Educated on need to stay well hydrated with water.  - Educated to call with any questions or problems.        Recommended medication changes: none

## 2021-03-02 DIAGNOSIS — E11.9 TYPE 2 DIABETES MELLITUS WITH HEMOGLOBIN A1C GOAL OF LESS THAN 7.0% (HCC): ICD-10-CM

## 2021-03-02 DIAGNOSIS — E53.8 VITAMIN B 12 DEFICIENCY: ICD-10-CM

## 2021-03-02 RX ORDER — METFORMIN HYDROCHLORIDE 500 MG/1
1000 TABLET, EXTENDED RELEASE ORAL 2 TIMES DAILY
Qty: 400 TABLET | Refills: 11 | Status: SHIPPED | OUTPATIENT
Start: 2021-03-02 | End: 2021-03-03 | Stop reason: SDUPTHER

## 2021-03-02 RX ORDER — METFORMIN HYDROCHLORIDE 500 MG/1
1000 TABLET, EXTENDED RELEASE ORAL 2 TIMES DAILY
Qty: 400 TABLET | Refills: 2 | Status: CANCELLED | OUTPATIENT
Start: 2021-03-02

## 2021-03-03 DIAGNOSIS — Z23 NEED FOR VACCINATION: ICD-10-CM

## 2021-03-03 RX ORDER — METFORMIN HYDROCHLORIDE 500 MG/1
1000 TABLET, EXTENDED RELEASE ORAL 2 TIMES DAILY
Qty: 400 TABLET | Refills: 11 | Status: SHIPPED
Start: 2021-03-03 | End: 2022-04-20

## 2021-03-11 ENCOUNTER — APPOINTMENT (RX ONLY)
Dept: URBAN - METROPOLITAN AREA CLINIC 22 | Facility: CLINIC | Age: 66
Setting detail: DERMATOLOGY
End: 2021-03-11

## 2021-03-11 DIAGNOSIS — L82.1 OTHER SEBORRHEIC KERATOSIS: ICD-10-CM

## 2021-03-11 DIAGNOSIS — D18.0 HEMANGIOMA: ICD-10-CM

## 2021-03-11 DIAGNOSIS — Z71.89 OTHER SPECIFIED COUNSELING: ICD-10-CM

## 2021-03-11 DIAGNOSIS — D22 MELANOCYTIC NEVI: ICD-10-CM

## 2021-03-11 DIAGNOSIS — L81.4 OTHER MELANIN HYPERPIGMENTATION: ICD-10-CM

## 2021-03-11 DIAGNOSIS — Z85.828 PERSONAL HISTORY OF OTHER MALIGNANT NEOPLASM OF SKIN: ICD-10-CM

## 2021-03-11 PROBLEM — D22.5 MELANOCYTIC NEVI OF TRUNK: Status: ACTIVE | Noted: 2021-03-11

## 2021-03-11 PROBLEM — D18.01 HEMANGIOMA OF SKIN AND SUBCUTANEOUS TISSUE: Status: ACTIVE | Noted: 2021-03-11

## 2021-03-11 PROCEDURE — 99213 OFFICE O/P EST LOW 20 MIN: CPT

## 2021-03-11 PROCEDURE — ? COUNSELING

## 2021-03-11 PROCEDURE — ? SUNSCREEN RECOMMENDATIONS

## 2021-03-11 ASSESSMENT — LOCATION DETAILED DESCRIPTION DERM
LOCATION DETAILED: RIGHT SUPERIOR MEDIAL MIDBACK
LOCATION DETAILED: RIGHT ANTERIOR PROXIMAL THIGH
LOCATION DETAILED: EPIGASTRIC SKIN
LOCATION DETAILED: LEFT SUPERIOR MEDIAL UPPER BACK

## 2021-03-11 ASSESSMENT — LOCATION SIMPLE DESCRIPTION DERM
LOCATION SIMPLE: LEFT UPPER BACK
LOCATION SIMPLE: ABDOMEN
LOCATION SIMPLE: RIGHT THIGH
LOCATION SIMPLE: RIGHT LOWER BACK

## 2021-03-11 ASSESSMENT — LOCATION ZONE DERM
LOCATION ZONE: LEG
LOCATION ZONE: TRUNK

## 2021-03-12 ENCOUNTER — IMMUNIZATION (OUTPATIENT)
Dept: FAMILY PLANNING/WOMEN'S HEALTH CLINIC | Facility: IMMUNIZATION CENTER | Age: 66
End: 2021-03-12
Attending: INTERNAL MEDICINE
Payer: MEDICARE

## 2021-03-12 DIAGNOSIS — Z23 ENCOUNTER FOR VACCINATION: Primary | ICD-10-CM

## 2021-03-12 DIAGNOSIS — Z23 NEED FOR VACCINATION: ICD-10-CM

## 2021-03-12 PROCEDURE — 0011A MODERNA SARS-COV-2 VACCINE: CPT | Performed by: HOSPITALIST

## 2021-03-12 PROCEDURE — 91301 MODERNA SARS-COV-2 VACCINE: CPT | Performed by: HOSPITALIST

## 2021-04-02 ENCOUNTER — OFFICE VISIT (OUTPATIENT)
Dept: URGENT CARE | Facility: PHYSICIAN GROUP | Age: 66
End: 2021-04-02
Payer: MEDICARE

## 2021-04-02 VITALS
HEART RATE: 82 BPM | DIASTOLIC BLOOD PRESSURE: 80 MMHG | RESPIRATION RATE: 16 BRPM | HEIGHT: 66 IN | OXYGEN SATURATION: 96 % | WEIGHT: 181 LBS | TEMPERATURE: 98.3 F | SYSTOLIC BLOOD PRESSURE: 110 MMHG | BODY MASS INDEX: 29.09 KG/M2

## 2021-04-02 DIAGNOSIS — E53.8 VITAMIN B 12 DEFICIENCY: ICD-10-CM

## 2021-04-02 DIAGNOSIS — H60.502 ACUTE OTITIS EXTERNA OF LEFT EAR, UNSPECIFIED TYPE: ICD-10-CM

## 2021-04-02 DIAGNOSIS — E78.5 DYSLIPIDEMIA: Chronic | ICD-10-CM

## 2021-04-02 DIAGNOSIS — M26.622 ARTHRALGIA OF LEFT TEMPOROMANDIBULAR JOINT: ICD-10-CM

## 2021-04-02 PROCEDURE — 99214 OFFICE O/P EST MOD 30 MIN: CPT | Performed by: PHYSICIAN ASSISTANT

## 2021-04-02 RX ORDER — CIPROFLOXACIN AND DEXAMETHASONE 3; 1 MG/ML; MG/ML
SUSPENSION/ DROPS AURICULAR (OTIC)
Qty: 7.5 ML | Refills: 0 | Status: SHIPPED | OUTPATIENT
Start: 2021-04-02 | End: 2021-06-14

## 2021-04-02 RX ORDER — CYCLOBENZAPRINE HCL 10 MG
10 TABLET ORAL NIGHTLY PRN
Qty: 15 TABLET | Refills: 0 | Status: SHIPPED | OUTPATIENT
Start: 2021-04-02 | End: 2021-06-14

## 2021-04-02 RX ORDER — LEVOTHYROXINE SODIUM 200 MCG
200 TABLET ORAL
Qty: 90 TABLET | Refills: 1 | Status: SHIPPED
Start: 2021-04-02 | End: 2021-06-18

## 2021-04-02 RX ORDER — SIMVASTATIN 40 MG
TABLET ORAL
Qty: 90 TABLET | Refills: 1 | Status: SHIPPED | OUTPATIENT
Start: 2021-04-02 | End: 2021-07-21

## 2021-04-02 ASSESSMENT — ENCOUNTER SYMPTOMS
TINGLING: 0
CARDIOVASCULAR NEGATIVE: 1
CONSTITUTIONAL NEGATIVE: 1
RESPIRATORY NEGATIVE: 1
SORE THROAT: 0

## 2021-04-02 ASSESSMENT — FIBROSIS 4 INDEX: FIB4 SCORE: 1.269230769230769231

## 2021-04-02 NOTE — PROGRESS NOTES
Subjective:   Kristian Frank is a 65 y.o. male who presents for Otalgia (Pt reportws left ear pain radiating to his jaw and keeping him awake. Onset 2 weeks. )    HPI  Patient presents to the clinic complaining of left ear pain with radiation in front of his left ear onset 2 weeks.  Waxing and waning.  Symptoms are worse with laying on his left ear and chewing.  He also reports of some earwax in his ear canal feeling dry.  Also reports he has been in the hot tub the last several weeks several times.  Denies any significant ear discharge.  No known history of teeth grinding.  Denies any mouth or dental pain.  No fevers or chills.  Denies any chest pain or shortness of breath.      Review of Systems   Constitutional: Negative.    HENT: Positive for ear pain. Negative for sore throat.         Left tmj pain   Respiratory: Negative.    Cardiovascular: Negative.    Neurological: Negative for tingling.       Medications:    • B-D ULTRAFINE III SHORT PEN Misc  • Blood Glucose Meter Kit  • cyanocobalamin Tabs  • FreeStyle Lite Nikki  • FREESTYLE TEST STRIPS Strp  • Jardiance Tabs  • Lancets  • metFORMIN ER Tb24  • Ozempic (1 MG/DOSE) Sopn  • pioglitazone Tabs  • simvastatin Tabs  • Synthroid Tabs  • vitamin D Tabs    Allergies: Patient has no known allergies.    Problem List: Kristian Frank has Dyslipidemia; Hypothyroid; Vitamin D deficiency disease; Type 2 diabetes mellitus with hemoglobin A1c goal of less than 7.0% (HCC); Abnormal EKG; Cigarette nicotine dependence without complication; Sleep apnea; Tenosynovitis of wrist; Allergic rhinitis; Bilateral low back pain without sciatica; Left cervical radiculopathy; Carotid atherosclerosis; Uncomplicated alcohol dependence (HCC); and Obesity (BMI 30-39.9) on their problem list.    Surgical History:  Past Surgical History:   Procedure Laterality Date   • APPENDECTOMY     • COLON RESECTION         Past Social Hx: Kristian Frank  reports that he has been smoking. He  "has been smoking about 0.50 packs per day. He has never used smokeless tobacco. He reports current alcohol use of about 9.6 oz of alcohol per week. He reports current drug use. Frequency: 4.00 times per week. Drugs: Marijuana and Inhaled.     Past Family Hx:  Kristian Frank family history includes Cancer in his sister; Diabetes in his brother, mother, sister, and sister; Heart Attack in his father; Heart Disease in his father; Hyperlipidemia in his brother, father, mother, and sister; Hypertension in his brother, father, mother, and sister; Lung Disease in his sister.     Problem list, medications, and allergies reviewed by myself today in Epic.     Objective:     /80 (BP Location: Left arm, Patient Position: Sitting, BP Cuff Size: Adult)   Pulse 82   Temp 36.8 °C (98.3 °F) (Temporal)   Resp 16   Ht 1.676 m (5' 6\")   Wt 82.1 kg (181 lb)   SpO2 96%   BMI 29.21 kg/m²     Physical Exam  Vitals reviewed.   Constitutional:       General: He is not in acute distress.     Appearance: Normal appearance. He is not ill-appearing or toxic-appearing.   HENT:      Head:        Comments: Mild TTP to TMJ area worse with opening and closing jaw.      Right Ear: There is impacted cerumen.      Left Ear: There is impacted cerumen. No mastoid tenderness.      Ears:      Comments: Negative rash   Positive tragal tenderness     Mouth/Throat:      Mouth: Mucous membranes are moist.      Dentition: No dental tenderness, gingival swelling, dental abscesses or gum lesions.      Pharynx: Oropharynx is clear. No oropharyngeal exudate or posterior oropharyngeal erythema.   Eyes:      Conjunctiva/sclera: Conjunctivae normal.      Pupils: Pupils are equal, round, and reactive to light.   Cardiovascular:      Rate and Rhythm: Normal rate.   Pulmonary:      Effort: Pulmonary effort is normal.   Musculoskeletal:      Cervical back: Neck supple.   Skin:     General: Skin is warm and dry.   Neurological:      General: No focal " deficit present.      Mental Status: He is alert and oriented to person, place, and time.   Psychiatric:         Mood and Affect: Mood normal.         Behavior: Behavior normal.         Assessment/Associated Orders     1. Acute otitis externa of left ear, unspecified type  ciprofloxacin/dexamethasone (CIPRODEX) 0.3-0.1 % Suspension   2. Arthralgia of left temporomandibular joint  cyclobenzaprine (FLEXERIL) 10 mg Tab       Medical Decision Making      Cerumen was successfully disimpacted bilaterally with irrigation by MA.  No complications.  Reevaluation showed erythematous and edematous left ear canal consistent with otitis externa.  Normal left TM.  Normal right ear canal and normal TM.     Patient signs and symptoms are consistent with pain around the TMJ joint most likely muscular.  In addition, signs symptoms consistent with otitis externa.  Will treat for both TMJ pain and otitis externa with ciprofloxacin/dexamethasone otic drops as well as Flexeril.   Discussed with patient this medication can cause drowsiness/sedation. The patient was instructed to use medication mostly during the evening/night time. Instructed to avoid driving, operating machinery, or drinking alcohol while using this medication. Patient verbalized understanding and agreement.    Encouraged jaw exercises shown to patient.  AVS printed.  He may take ibuprofen per 's instructions for pain.    I personally reviewed prior external notes and test results pertinent to today's visit. Supportive care, differential diagnoses, and indications for immediate follow-up discussed with patient.    Patient expresses understanding and agrees to plan. Patient denies any other questions or concerns.     Advised the patient to follow-up with the primary care physician for recheck, reevaluation, and consideration of further management.    My total time spent caring for the patient on the day of the encounter that included review of prior records,  obtaining history, examination, discussion of plan and return precautions was 30 minutes.       Please note that this dictation was created using voice recognition software. I have made a reasonable attempt to correct obvious errors, but I expect that there are errors of grammar and possibly content that I did not discover before finalizing the note.    This note was electronically signed by Ayaz Emerson PA-C

## 2021-04-02 NOTE — PATIENT INSTRUCTIONS
Temporomandibular Joint Syndrome    Temporomandibular joint syndrome (TMJ syndrome) is a condition that causes pain in the temporomandibular joints. These joints are located near your ears and allow your jaw to open and close. For people with TMJ syndrome, chewing, biting, or other movements of the jaw can be difficult or painful.  TMJ syndrome is often mild and goes away within a few weeks. However, sometimes the condition becomes a long-term (chronic) problem.  What are the causes?  This condition may be caused by:  · Grinding your teeth or clenching your jaw. Some people do this when they are under stress.  · Arthritis.  · Injury to the jaw.  · Head or neck injury.  · Teeth or dentures that are not aligned well.  In some cases, the cause of TMJ syndrome may not be known.  What are the signs or symptoms?  The most common symptom of this condition is an aching pain on the side of the head in the area of the TMJ. Other symptoms may include:  · Pain when moving your jaw, such as when chewing or biting.  · Being unable to open your jaw all the way.  · Making a clicking sound when you open your mouth.  · Headache.  · Earache.  · Neck or shoulder pain.  How is this diagnosed?  This condition may be diagnosed based on:  · Your symptoms and medical history.  · A physical exam. Your health care provider may check the range of motion of your jaw.  · Imaging tests, such as X-rays or an MRI.  You may also need to see your dentist, who will determine if your teeth and jaw are lined up correctly.  How is this treated?  TMJ syndrome often goes away on its own. If treatment is needed, the options may include:  · Eating soft foods and applying ice or heat.  · Medicines to relieve pain or inflammation.  · Medicines or massage to relax the muscles.  · A splint, bite plate, or mouthpiece to prevent teeth grinding or jaw clenching.  · Relaxation techniques or counseling to help reduce stress.  · A therapy for pain in which an  electrical current is applied to the nerves through the skin (transcutaneous electrical nerve stimulation).  · Acupuncture. This is sometimes helpful to relieve pain.  · Jaw surgery. This is rarely needed.  Follow these instructions at home:    Eating and drinking  · Eat a soft diet if you are having trouble chewing.  · Avoid foods that require a lot of chewing. Do not chew gum.  General instructions  · Take over-the-counter and prescription medicines only as told by your health care provider.  · If directed, put ice on the painful area.  ? Put ice in a plastic bag.  ? Place a towel between your skin and the bag.  ? Leave the ice on for 20 minutes, 2-3 times a day.  · Apply a warm, wet cloth (warm compress) to the painful area as directed.  · Massage your jaw area and do any jaw stretching exercises as told by your health care provider.  · If you were given a splint, bite plate, or mouthpiece, wear it as told by your health care provider.  · Keep all follow-up visits as told by your health care provider. This is important.  Contact a health care provider if:  · You are having trouble eating.  · You have new or worsening symptoms.  Get help right away if:  · Your jaw locks open or closed.  Summary  · Temporomandibular joint syndrome (TMJ syndrome) is a condition that causes pain in the temporomandibular joints. These joints are located near your ears and allow your jaw to open and close.  · TMJ syndrome is often mild and goes away within a few weeks. However, sometimes the condition becomes a long-term (chronic) problem.  · Symptoms include an aching pain on the side of the head in the area of the TMJ, pain when chewing or biting, and being unable to open your jaw all the way. You may also make a clicking sound when you open your mouth.  · TMJ syndrome often goes away on its own. If treatment is needed, it may include medicines to relieve pain, reduce inflammation, or relax the muscles. A splint, bite plate, or  mouthpiece may also be used to prevent teeth grinding or jaw clenching.  This information is not intended to replace advice given to you by your health care provider. Make sure you discuss any questions you have with your health care provider.  Document Released: 09/12/2002 Document Revised: 03/01/2019 Document Reviewed: 01/29/2019  Elsevier Patient Education © 2020 [employer]

## 2021-04-10 ENCOUNTER — IMMUNIZATION (OUTPATIENT)
Dept: FAMILY PLANNING/WOMEN'S HEALTH CLINIC | Facility: IMMUNIZATION CENTER | Age: 66
End: 2021-04-10
Attending: INTERNAL MEDICINE
Payer: MEDICARE

## 2021-04-10 DIAGNOSIS — Z23 ENCOUNTER FOR VACCINATION: Primary | ICD-10-CM

## 2021-04-10 PROCEDURE — 0012A MODERNA SARS-COV-2 VACCINE: CPT

## 2021-04-10 PROCEDURE — 91301 MODERNA SARS-COV-2 VACCINE: CPT

## 2021-06-08 DIAGNOSIS — E03.9 ACQUIRED HYPOTHYROIDISM: ICD-10-CM

## 2021-06-08 DIAGNOSIS — E11.9 TYPE 2 DIABETES MELLITUS WITH HEMOGLOBIN A1C GOAL OF LESS THAN 7.0% (HCC): ICD-10-CM

## 2021-06-10 ENCOUNTER — HOSPITAL ENCOUNTER (OUTPATIENT)
Dept: LAB | Facility: MEDICAL CENTER | Age: 66
End: 2021-06-10
Attending: NURSE PRACTITIONER
Payer: MEDICARE

## 2021-06-10 DIAGNOSIS — E11.9 TYPE 2 DIABETES MELLITUS WITH HEMOGLOBIN A1C GOAL OF LESS THAN 7.0% (HCC): ICD-10-CM

## 2021-06-10 DIAGNOSIS — E03.9 ACQUIRED HYPOTHYROIDISM: ICD-10-CM

## 2021-06-10 DIAGNOSIS — E53.8 VITAMIN B 12 DEFICIENCY: ICD-10-CM

## 2021-06-10 LAB
T3FREE SERPL-MCNC: 2.44 PG/ML (ref 2–4.4)
T4 FREE SERPL-MCNC: 1.41 NG/DL (ref 0.93–1.7)
TSH SERPL DL<=0.005 MIU/L-ACNC: 0.21 UIU/ML (ref 0.38–5.33)

## 2021-06-10 PROCEDURE — 84439 ASSAY OF FREE THYROXINE: CPT

## 2021-06-10 PROCEDURE — 84481 FREE ASSAY (FT-3): CPT

## 2021-06-10 PROCEDURE — 82043 UR ALBUMIN QUANTITATIVE: CPT

## 2021-06-10 PROCEDURE — 84443 ASSAY THYROID STIM HORMONE: CPT

## 2021-06-10 PROCEDURE — 36415 COLL VENOUS BLD VENIPUNCTURE: CPT

## 2021-06-10 PROCEDURE — 82570 ASSAY OF URINE CREATININE: CPT

## 2021-06-11 LAB
CREAT UR-MCNC: 79.39 MG/DL
MICROALBUMIN UR-MCNC: 1.2 MG/DL
MICROALBUMIN/CREAT UR: 15 MG/G (ref 0–30)

## 2021-06-14 ENCOUNTER — OFFICE VISIT (OUTPATIENT)
Dept: ENDOCRINOLOGY | Facility: MEDICAL CENTER | Age: 66
End: 2021-06-14
Attending: NURSE PRACTITIONER
Payer: MEDICARE

## 2021-06-14 VITALS
OXYGEN SATURATION: 96 % | DIASTOLIC BLOOD PRESSURE: 62 MMHG | HEART RATE: 92 BPM | HEIGHT: 66 IN | WEIGHT: 177 LBS | SYSTOLIC BLOOD PRESSURE: 104 MMHG | BODY MASS INDEX: 28.45 KG/M2

## 2021-06-14 DIAGNOSIS — E03.9 HYPOTHYROIDISM (ACQUIRED): ICD-10-CM

## 2021-06-14 DIAGNOSIS — E78.5 HYPERLIPIDEMIA ASSOCIATED WITH TYPE 2 DIABETES MELLITUS (HCC): ICD-10-CM

## 2021-06-14 DIAGNOSIS — E11.69 HYPERLIPIDEMIA ASSOCIATED WITH TYPE 2 DIABETES MELLITUS (HCC): ICD-10-CM

## 2021-06-14 DIAGNOSIS — E55.9 VITAMIN D DEFICIENCY: ICD-10-CM

## 2021-06-14 DIAGNOSIS — E11.9 TYPE 2 DIABETES MELLITUS WITH HEMOGLOBIN A1C GOAL OF LESS THAN 7.0% (HCC): ICD-10-CM

## 2021-06-14 LAB
HBA1C MFR BLD: 6.4 % (ref 0–5.6)
INT CON NEG: ABNORMAL
INT CON POS: ABNORMAL

## 2021-06-14 PROCEDURE — 83036 HEMOGLOBIN GLYCOSYLATED A1C: CPT | Performed by: NURSE PRACTITIONER

## 2021-06-14 PROCEDURE — 99214 OFFICE O/P EST MOD 30 MIN: CPT | Performed by: NURSE PRACTITIONER

## 2021-06-14 PROCEDURE — 99213 OFFICE O/P EST LOW 20 MIN: CPT | Performed by: NURSE PRACTITIONER

## 2021-06-14 RX ORDER — PIOGLITAZONEHYDROCHLORIDE 30 MG/1
TABLET ORAL
COMMUNITY
Start: 2021-05-25 | End: 2021-09-07 | Stop reason: SDUPTHER

## 2021-06-14 RX ORDER — LEVOTHYROXINE SODIUM 0.2 MG/1
200 TABLET ORAL
Qty: 90 TABLET | Refills: 2 | Status: SHIPPED | OUTPATIENT
Start: 2021-06-14 | End: 2022-07-13 | Stop reason: SDUPTHER

## 2021-06-14 ASSESSMENT — FIBROSIS 4 INDEX: FIB4 SCORE: 1.288461538461538462

## 2021-06-14 NOTE — PROGRESS NOTES
"RN-CDE Note    Subjective:   Endocrinology Clinic Progress Note  PCP: LEON Hernandez    HPI:  Kristian Frank is a 66 y.o. old patient who is seen today for review of Type 2 Diabetes, Hypothyroidism, and Dyslipidemia.    DM:   Last A1c:   Lab Results   Component Value Date/Time    HBA1C 6.4 (A) 06/14/2021 12:54 PM      Previous A1c was 6.9 on 2/3/21  A1C GOAL: < 7    Diabetes Medications:   Ozempic 1 mg weekly  Jardiance 10 mg daily  Metformin  mg 2 BID  Actos 30 mg daily    Taking above medications as prescribed: yes  Taking daily ASA: Yes    Exercise: Walking daily  Diet: \"healthy\" diet  in general  Patient's body mass index is 28.57 kg/m². Exercise and nutrition counseling were performed at this visit.    Glucose monitoring frequency: Testing randomly  120's  Hypoglycemic episodes: no  Last Retinal Exam: February 2021 with Dr. Fuller  Daily Foot Exam: Yes   Foot Exam:  Monofilament: done  Monofilament testing with a 10 gram force: sensation intact: intact bilaterally  Visual Inspection: Feet without maceration, ulcers, fissures.  Pedal pulses: intact bilaterally   Lab Results   Component Value Date/Time    MICROALBCALC 5.9 10/16/2013 07:12 AM    MALBCRT 15 06/10/2021 01:17 PM    MICROALBUR 1.2 06/10/2021 01:17 PM    MICRALB 7.4 10/16/2013 07:12 AM      ACR Albumin/Creatinine Ratio goal <30   Currently Rx ACE/ARB: No   Dyslipidemia:  Lab Results   Component Value Date/Time    CHOLSTRLTOT 134 02/03/2021 06:59 AM    LDL 72 02/03/2021 06:59 AM    HDL 41 02/03/2021 06:59 AM    TRIGLYCERIDE 105 02/03/2021 06:59 AM       Currently Rx Statin: Yes     He  reports that he has been smoking. He has been smoking about 0.50 packs per day. He has never used smokeless tobacco.      Plan:     Discussed and educated on:   - All medications, side effects and compliance (discussed carefully)  - Annual eye examinations at Ophthalmology  - Home glucose monitoring emphasized  - Weight control and daily " exercise    Recommended medication changes: He would like help with his diabetes medications due to high cost.

## 2021-06-14 NOTE — PROGRESS NOTES
CHIEF COMPLAINT: Patient is here for follow up of Type 2 Diabetes Mellitus, Hypothyroidism, Dyslipidemia & Vitamin D Deficiency.     HPI:     Kristian Frank is a very pleasant 65 y.o. male with for continued evaluation & treatment of the followin. Type 2 Diabetes Mellitus   Current Diabetes Regimen:  GLP-1 Agent: Ozempic 1 mg daily  SGLT2-Jardiance 10mg daily  Metformin  mg 2 BID  Other: Actos 30 mg daily.     Hypoglycemia:  None    Point-of-care A1c 2021: 6.4%    BG Diary:02/10/21 Checks 1-2 times per week.  Before Breakfast: 120-125    Weight down 5 pounds from previous appointment.     Diabetes Complications   Retinopathy: No known retinopathy.  Last eye exam: Completed 2021 with Dr. Fuller.  Neuropathy: R foot numbness in feet r/t low back injury and pain.  Denies any foot wounds.  Exercise: Avid hiker and Cross Country Skier  Diet: Fair.    /62.  No current ACE or ARB therapy.     Ref. Range 6/10/2021 13:17   Micro Alb Creat Ratio Latest Ref Range: 0 - 30 mg/g 15   Creatinine, Urine Latest Units: mg/dL 79.39   Microalbumin, Urine Random Latest Units: mg/dL 1.2       2. Hypothyroidism   Currently taking Synthroid 200mcg x 5 days. Patient would like to change to generic levothyroxine to 6 Monday. Pt has been doing this schedule for years. Adequate compliance and he takes thyroid hormone on empty stomach 45 minutes prior to breakfast.   Denies heart palpitations, tremor and/or anxiousness.      Ref. Range 6/10/2021 13:17   TSH Latest Ref Range: 0.380 - 5.330 uIU/mL 0.210 (L)   Free T-4 Latest Ref Range: 0.93 - 1.70 ng/dL 1.41   T3,Free Latest Ref Range: 2.00 - 4.40 pg/mL 2.44         3. Dyslipidemia   Simvastatin 40mg QD.   Denies muscle weakness/fatigue.    Ref. Range 2/3/2021 06:59   Cholesterol,Tot Latest Ref Range: 100 - 199 mg/dL 134   Triglycerides Latest Ref Range: 0 - 149 mg/dL 105   HDL Latest Ref Range: >=40 mg/dL 41   LDL Latest Ref Range: <100 mg/dL 72       4.  Vitamin D Deficiency  Currently taking Vitamin D 1000IU daily.      Ref. Range 2/3/2021 06:59   25-Hydroxy   Vitamin D 25 Latest Ref Range: 30 - 100 ng/mL 78       Patient's medications, allergies, and social histories were reviewed and updated as appropriate.    ROS:     CONS:     No fever, no chills   EYES:     No diplopia, no blurry vision   CV:           No chest pain, no palpitations   PULM:     No SOB, no cough, no hemoptysis.   GI:            No nausea, no vomiting, no diarrhea, no constipation   ENDO:     No polyuria, no polydipsia, no heat intolerance, no cold intolerance       Past Medical History:  Problem List:  2020-05: Obesity (BMI 30-39.9)  2019-05: Uncomplicated alcohol dependence ([facility])  2019-05: Essential hypertension  2018-06: Obesity (BMI 30-39.9)  2018-03: Carotid atherosclerosis  2018-01: Left cervical radiculopathy  2018-01: Left hand weakness  2016-04: Allergic rhinitis  2016-04: Post-nasal drip  2016-04: Bilateral low back pain without sciatica  2016-02: Abnormal EKG  2016-02: Cigarette nicotine dependence without complication  2016-02: Obesity (BMI 30.0-34.9)  2016-02: Epigastric abdominal pain  2016-02: Sleep apnea  2016-02: Tenosynovitis of wrist  2013-10: Type 2 diabetes mellitus with hemoglobin A1c goal of less   than 7.0% ([facility])  2012-12: Vitamin D deficiency disease  Dyslipidemia  DIABETES MELLITUS  Hypothyroid  Diverticulitis      Past Surgical History:  Past Surgical History:   Procedure Laterality Date   • APPENDECTOMY     • COLON RESECTION          Allergies:  Patient has no known allergies.     Social History:  Social History     Tobacco Use   • Smoking status: Current Every Day Smoker     Packs/day: 0.50   • Smokeless tobacco: Never Used   Vaping Use   • Vaping Use: Never used   Substance Use Topics   • Alcohol use: Yes     Alcohol/week: 9.6 oz     Types: 16 Cans of beer per week   • Drug use: Yes     Frequency: 4.0 times per week     Types: Marijuana, Inhaled        Family  "History:   family history includes Cancer in his sister; Diabetes in his brother, mother, sister, and sister; Heart Attack in his father; Heart Disease in his father; Hyperlipidemia in his brother, father, mother, and sister; Hypertension in his brother, father, mother, and sister; Lung Disease in his sister.      PHYSICAL EXAM:   OBJECTIVE:  Vital signs: Pulse 92   Ht 1.676 m (5' 6\")   Wt 80.3 kg (177 lb)   SpO2 96%   BMI 28.57 kg/m²   GENERAL: Well-developed, well-nourished in no apparent distress.   EYE:  No ocular asymmetry, PERRLA  HENT: Pink, moist mucous membranes.    NECK: No thyromegaly.   CARDIOVASCULAR:  No murmurs  LUNGS: Clear breath sounds  ABDOMEN: Soft, nontender   EXTREMITIES: No clubbing, cyanosis, or edema.   NEUROLOGICAL: No gross focal motor abnormalities   LYMPH: No cervical adenopathy palpated.   SKIN: No rashes, lesions.       ASSESSMENT/PLAN:     1. Type 2 diabetes mellitus without complication, without long-term current use of insulin (HCC)  Stable.  Continue the following regimen:   GLP-1 Agent: Ozempic 1 mg daily  SGLT2-Jardiance 10mg daily  Metformin  mg 2 BID  Other: Actos 30 mg daily.    Continue twice weekly glucose checks.   Recommend 2L-3L of water daily.   Continue to exercise 150 minutes per week.   Continue daily foot inspection.     2. Acquired hypothyroidism  Stable  Continue taking levothyroxine 200mcg 5 days per week. Prescription changed to generic levothyroxine during appointment.  Repeat labs before next appointment.       3. Dyslipidemia  Stable  Continue taking Simvastatin 40mg QD.    4. Vitamin D deficiency  Stable  Continue taking Vitamin D 1000IU QD.    Repeat labs 1 week prior to follow up appointment.   Follow up appointment in 3 months.      Thank you kindly for allowing me to participate in the diabetes care plan for this patient.    Ashley Osborne, APRN  06/14/2021    CC:   LEON Hernandez  "

## 2021-07-21 DIAGNOSIS — E78.5 DYSLIPIDEMIA: Chronic | ICD-10-CM

## 2021-07-21 RX ORDER — SIMVASTATIN 40 MG
TABLET ORAL
Qty: 90 TABLET | Refills: 0 | Status: SHIPPED | OUTPATIENT
Start: 2021-07-21 | End: 2021-08-09 | Stop reason: SDUPTHER

## 2021-08-09 ENCOUNTER — OFFICE VISIT (OUTPATIENT)
Dept: MEDICAL GROUP | Facility: MEDICAL CENTER | Age: 66
End: 2021-08-09
Payer: MEDICARE

## 2021-08-09 VITALS
OXYGEN SATURATION: 96 % | TEMPERATURE: 97.5 F | WEIGHT: 177.91 LBS | DIASTOLIC BLOOD PRESSURE: 62 MMHG | BODY MASS INDEX: 28.59 KG/M2 | SYSTOLIC BLOOD PRESSURE: 108 MMHG | HEART RATE: 88 BPM | HEIGHT: 66 IN

## 2021-08-09 DIAGNOSIS — E11.41 TYPE 2 DIABETES MELLITUS WITH DIABETIC MONONEUROPATHY, WITHOUT LONG-TERM CURRENT USE OF INSULIN (HCC): ICD-10-CM

## 2021-08-09 DIAGNOSIS — E03.4 HYPOTHYROIDISM DUE TO ACQUIRED ATROPHY OF THYROID: ICD-10-CM

## 2021-08-09 DIAGNOSIS — F17.210 CIGARETTE NICOTINE DEPENDENCE WITHOUT COMPLICATION: Chronic | ICD-10-CM

## 2021-08-09 DIAGNOSIS — J30.2 SEASONAL ALLERGIC RHINITIS, UNSPECIFIED TRIGGER: Chronic | ICD-10-CM

## 2021-08-09 DIAGNOSIS — M54.41 CHRONIC BILATERAL LOW BACK PAIN WITH RIGHT-SIDED SCIATICA: Chronic | ICD-10-CM

## 2021-08-09 DIAGNOSIS — E55.9 VITAMIN D DEFICIENCY: Chronic | ICD-10-CM

## 2021-08-09 DIAGNOSIS — G47.30 SLEEP APNEA, UNSPECIFIED TYPE: ICD-10-CM

## 2021-08-09 DIAGNOSIS — E78.5 DYSLIPIDEMIA: Chronic | ICD-10-CM

## 2021-08-09 DIAGNOSIS — G89.29 CHRONIC BILATERAL LOW BACK PAIN WITH RIGHT-SIDED SCIATICA: Chronic | ICD-10-CM

## 2021-08-09 DIAGNOSIS — E66.9 OBESITY (BMI 30-39.9): ICD-10-CM

## 2021-08-09 DIAGNOSIS — G89.29 CHRONIC RIGHT SHOULDER PAIN: ICD-10-CM

## 2021-08-09 DIAGNOSIS — F10.20 UNCOMPLICATED ALCOHOL DEPENDENCE (HCC): Chronic | ICD-10-CM

## 2021-08-09 DIAGNOSIS — M54.12 LEFT CERVICAL RADICULOPATHY: ICD-10-CM

## 2021-08-09 DIAGNOSIS — I65.23 ATHEROSCLEROSIS OF BOTH CAROTID ARTERIES: ICD-10-CM

## 2021-08-09 DIAGNOSIS — Z12.5 SCREENING FOR MALIGNANT NEOPLASM OF PROSTATE: ICD-10-CM

## 2021-08-09 DIAGNOSIS — Z00.00 MEDICARE ANNUAL WELLNESS VISIT, SUBSEQUENT: ICD-10-CM

## 2021-08-09 DIAGNOSIS — M25.511 CHRONIC RIGHT SHOULDER PAIN: ICD-10-CM

## 2021-08-09 PROCEDURE — G0439 PPPS, SUBSEQ VISIT: HCPCS | Performed by: NURSE PRACTITIONER

## 2021-08-09 RX ORDER — SIMVASTATIN 40 MG
40 TABLET ORAL EVERY EVENING
Qty: 90 TABLET | Refills: 3 | Status: SHIPPED | OUTPATIENT
Start: 2021-08-09 | End: 2021-11-04

## 2021-08-09 ASSESSMENT — PATIENT HEALTH QUESTIONNAIRE - PHQ9: CLINICAL INTERPRETATION OF PHQ2 SCORE: 0

## 2021-08-09 ASSESSMENT — ENCOUNTER SYMPTOMS: GENERAL WELL-BEING: GOOD

## 2021-08-09 ASSESSMENT — FIBROSIS 4 INDEX: FIB4 SCORE: 1.288461538461538462

## 2021-08-09 ASSESSMENT — ACTIVITIES OF DAILY LIVING (ADL): BATHING_REQUIRES_ASSISTANCE: 0

## 2021-08-09 NOTE — PROGRESS NOTES
"Chief Complaint   Patient presents with   • Annual Wellness Visit       HPI:  Kristian ALDRIDGE is a 66 y.o. here for Medicare Annual Wellness Visit    Medicare annual wellness  Present for annual wellness visit.     Diabetes  Followed by endocrinology for this.  Currently taking Ozempic, Jardiance, Metformin and Actos. Most recent A1c 6.4%. Has some numbness on the ball of his right foot. This is not new -attributes to his sciatica pain.     Shoulder pain  Went to [facility] and was told he has \"loss of cartilage and bone on bone.\" Conservative management at this time.     Patient Active Problem List    Diagnosis Date Noted   • Type 2 diabetes mellitus with diabetic mononeuropathy, without long-term current use of insulin ([facility]) 08/09/2021   • Obesity (BMI 30-39.9) 05/26/2020   • Uncomplicated alcohol dependence ([facility]) 05/08/2019   • Carotid atherosclerosis 03/27/2018   • Left cervical radiculopathy 01/29/2018   • Allergic rhinitis 04/18/2016   • Chronic bilateral low back pain with right-sided sciatica 04/18/2016   • Abnormal EKG 02/11/2016   • Cigarette nicotine dependence without complication 02/11/2016   • Sleep apnea 02/11/2016   • Tenosynovitis of wrist 02/11/2016   • Vitamin D deficiency disease 12/04/2012   • Dyslipidemia    • Hypothyroid        Current Outpatient Medications   Medication Sig Dispense Refill   • simvastatin (ZOCOR) 40 MG Tab Take 1 tablet by mouth every evening. 90 tablet 3   • pioglitazone (ACTOS) 30 MG Tab      • levothyroxine (SYNTHROID) 200 MCG Tab Take 1 tablet by mouth every morning on an empty stomach. 90 tablet 2   • cyanocobalamin (VITAMIN B12) 1000 MCG Tab Take 1 tablet by mouth every day. 30 tablet 11   • metFORMIN ER (GLUCOPHAGE XR) 500 MG TABLET SR 24 HR Take 2 Tablets by mouth 2 times a day. Please give Generic XR Metformin 400 tablet 11   • FREESTYLE TEST STRIPS strip USE ONE NEW TEST STRIP EACH TIME TO TEST BLOOD SUGAR TWO TIMES A DAY AND AS NEEDED IF SYMPTOMS FOR HIGH OR LOW BLOOD SUGAR 100 " Strip 0   • Empagliflozin (JARDIANCE) 10 MG Tab Take 1 tablet by mouth every day. 100 Tab 3   • Semaglutide, 1 MG/DOSE, (OZEMPIC, 1 MG/DOSE,) 2 MG/1.5ML Solution Pen-injector Inject 1 mg as instructed every 7 days. 7 PEN 2   • Lancets Use one Freestyle lancet to test blood sugar twice daily. 100 Each 4   • Blood Glucose Meter Kit Test blood sugar as recommended by provider. Freestyle blood glucose monitoring kit. 1 Kit 0   • vitamin D (CHOLECALCIFEROL) 1000 UNIT Tab Take 3 Tabs by mouth every day. 60 Tab      No current facility-administered medications for this visit.        Patient is taking medications as noted in medication list.  Current supplements as per medication list.     Allergies: Patient has no known allergies.    Current social contact/activities: activities with wife, retired    Is patient current with immunizations? Yes.    He  reports that he has been smoking. He has been smoking about 0.50 packs per day. He has never used smokeless tobacco. He reports current alcohol use of about 8.4 - 12.6 oz of alcohol per week. He reports current drug use. Frequency: 4.00 times per week. Drugs: Marijuana and Inhaled.  Ready to quit: Not Answered  Counseling given: Not Answered        DPA/Advanced directive: Patient does not have an Advanced Directive.  A packet and workshop information was given on Advanced Directives.    ROS:    Gait: Uses no assistive device   Ostomy: No   Other tubes: No   Amputations: No   Chronic oxygen use No   Last eye exam 2/2021   Wears hearing aids: No   : Reports urinary leakage during the last 6 months that has not interfered at all with their daily activities or sleep.    Annual Health Assessment Questions:    1.  Are you currently engaging in any exercise or physical activity? Yes; Walk 2-3 miles daily, Hiking.    2.  How would you describe your mood or emotional well-being today? good    3.  Have you had any falls in the last year? No    4.  Have you noticed any problems with  your balance or had difficulty walking? No    5.  In the last six months have you experienced any leakage of urine? No    6. DPA/Advanced Directive: Patient does not have an Advanced Directive.  A packet and workshop information was given on Advanced Directives.      Screening:    Depression Screening    Little interest or pleasure in doing things?  0 - not at all  Feeling down, depressed, or hopeless? 0 - not at all  Patient Health Questionnaire Score: 0    If depressive symptoms identified deferred to follow up visit unless specifically addressed in assessment and plan.    Interpretation of PHQ-9 Total Score   Score Severity   1-4 No Depression   5-9 Mild Depression   10-14 Moderate Depression   15-19 Moderately Severe Depression   20-27 Severe Depression    Screening for Cognitive Impairment    Three Minute Recall (captain, garden, picture)  1/3    Finn clock face with all 12 numbers and set the hands to show 5 past 8.  Yes    If cognitive concerns identified, deferred for follow up unless specifically addressed in assessment and plan.    Fall Risk Assessment    Has the patient had two or more falls in the last year or any fall with injury in the last year?  No  If fall risk identified, deferred for follow up unless specifically addressed in assessment and plan.    Safety Assessment    Throw rugs on floor.  No  Handrails on all stairs.  Yes  Good lighting in all hallways.  Yes  Difficulty hearing.  No  Patient counseled about all safety risks that were identified.    Functional Assessment ADLs    Are there any barriers preventing you from cooking for yourself or meeting nutritional needs?  No.    Are there any barriers preventing you from driving safely or obtaining transportation?  No.    Are there any barriers preventing you from using a telephone or calling for help?  No.    Are there any barriers preventing you from shopping?  No.    Are there any barriers preventing you from taking care of your own finances?   No.    Are there any barriers preventing you from managing your medications?  No.    Are there any barriers preventing you from showering, bathing or dressing yourself?  No.    Are you currently engaging in any exercise or physical activity?  Yes.  Walk 2-3 miles daily, Hiking.  What is your perception of your health?  Good.    Health Maintenance Summary                IMM INFLUENZA Next Due 9/1/2021      Done 11/20/2020 Imm Admin: Influenza Vaccine Adult HD     Patient has more history with this topic...    A1C SCREENING Next Due 12/14/2021      Done 6/14/2021 POCT A1C     Patient has more history with this topic...    FASTING LIPID PROFILE Next Due 2/3/2022      Done 2/3/2021 LIPID PROFILE     Patient has more history with this topic...    SERUM CREATININE Next Due 2/3/2022      Done 2/3/2021 COMP METABOLIC PANEL     Patient has more history with this topic...    RETINAL SCREENING Next Due 2/18/2022      Done 2/18/2021      Patient has more history with this topic...    URINE ACR / MICROALBUMIN Next Due 6/10/2022      Done 6/10/2021 MICROALBUMIN CREAT RATIO URINE     Patient has more history with this topic...    DIABETES MONOFILAMENT / LE EXAM Next Due 6/14/2022      Done 6/14/2021 AMB DIABETIC MONOFILAMENT LOWER EXTREMITY EXAM     Patient has more history with this topic...    Annual Wellness Visit Next Due 8/10/2022      Done 8/9/2021 SUBSEQUENT ANNUAL WELLNESS VISIT-INCLUDES PPPS ()     Patient has more history with this topic...    COLORECTAL CANCER SCREENING Next Due 8/12/2023     IMM DTaP/Tdap/Td Vaccine Next Due 11/8/2023      Done 11/8/2013 Imm Admin: Tdap Vaccine    IMM PNEUMOCOCCAL VACCINE: 65+ Years Next Due 5/6/2024      Done 5/6/2019 Imm Admin: Pneumococcal Conjugate Vaccine (Prevnar/PCV-13)     Patient has more history with this topic...          Patient Care Team:  LEON Hernandez as PCP - General (Nurse Practitioner)  Tello Fuller O.D. as Consulting Physician  "(Optometry)  Jason Holbrook P.A.-C. (Endocrinology)  Tsering Rodriguez as      Social History     Tobacco Use   • Smoking status: Current Every Day Smoker     Packs/day: 0.50   • Smokeless tobacco: Never Used   Vaping Use   • Vaping Use: Never used   Substance Use Topics   • Alcohol use: Yes     Alcohol/week: 8.4 - 12.6 oz     Types: 14 - 21 Cans of beer per week     Comment: 2-3 a day   • Drug use: Yes     Frequency: 4.0 times per week     Types: Marijuana, Inhaled     Family History   Problem Relation Age of Onset   • Diabetes Mother    • Hypertension Mother    • Hyperlipidemia Mother    • Heart Disease Father    • Hypertension Father    • Hyperlipidemia Father    • Heart Attack Father    • Diabetes Sister    • Hyperlipidemia Sister    • Diabetes Brother         Type 2   • Hypertension Brother    • Hyperlipidemia Brother    • Hypertension Sister    • Diabetes Sister         type 2   • Cancer Sister         Lung cancer   • Lung Disease Sister      He  has a past medical history of Back pain, DIABETES MELLITUS, Diverticulitis, GOUT, Hyperlipidemia, Hypothyroid, Pneumonia, Post-nasal drip, and Rhinitis.   Past Surgical History:   Procedure Laterality Date   • APPENDECTOMY     • COLON RESECTION         Exam:     /62 (BP Location: Right arm, Patient Position: Sitting, BP Cuff Size: Adult)   Pulse 88   Temp 36.4 °C (97.5 °F) (Temporal)   Ht 1.676 m (5' 6\")   Wt 80.7 kg (177 lb 14.6 oz)   SpO2 96%  Body mass index is 28.72 kg/m².    Hearing good.    Dentition good  Alert, oriented in no acute distress.  Eye contact is good, speech goal directed, affect calm      Assessment and Plan. The following treatment and monitoring plan is recommended:    1. Medicare annual wellness visit, subsequent   Have discussed recommend screenings and immunizations. Recommend smoking cessation, ETOH reduction.  Subsequent Annual Wellness Visit - Includes PPPS ()   2. Type 2 diabetes mellitus with diabetic " mononeuropathy, without long-term current use of insulin (HCC)   Controlled at this time.  A1c at 6.4%. Continue current regimen. Followed by Endocrinology.  Subsequent Annual Wellness Visit - Includes PPPS ()   3. Hypothyroidism due to acquired atrophy of thyroid   Overcorrected TSH. Continue current recommendations by Endocrinology.  Subsequent Annual Wellness Visit - Includes PPPS ()   4. Dyslipidemia   Stable on current statin. No myalgias. Goal LDL <70. Continue heart healthy diet and current statin.    Subsequent Annual Wellness Visit - Includes PPPS ()    simvastatin (ZOCOR) 40 MG Tab   5. Vitamin D deficiency disease   Stable on OTC therapy. Continue.  Subsequent Annual Wellness Visit - Includes PPPS ()   6. Cigarette nicotine dependence without complication   Chronic. Recommend cessation-declines interest at this time.  Subsequent Annual Wellness Visit - Includes PPPS ()   7. Seasonal allergic rhinitis, unspecified trigger   Chronic. OTC helpful for this. Continue current regimen.  Subsequent Annual Wellness Visit - Includes PPPS ()   8. Sleep apnea, unspecified type   Chronic. Not treated at this time with CPAP d/t intolerance. Subsequent Annual Wellness Visit - Includes PPPS ()   9. Obesity (BMI 30-39.9)   Chronic. Continue to work on heart healthy diet, exercise, reduce ETOH use.  Subsequent Annual Wellness Visit - Includes PPPS ()   10. Atherosclerosis of both carotid arteries   Known problem. Last doppler was 2018. Recommend smoking cessation. Continue statin therapy. Monitor for symptoms.  Subsequent Annual Wellness Visit - Includes PPPS ()   11. Chronic bilateral low back pain with right-sided sciatica   Chronic problem. No red flags. Continue conservative management. Follow up if symptoms worsen. Subsequent Annual Wellness Visit - Includes PPPS ()   12. Left cervical radiculopathy   Chronic problem. Conservative management helpful for this. Follow up  if symptoms worsen.  Subsequent Annual Wellness Visit - Includes PPPS ()   13. Uncomplicated alcohol dependence (HCC)   Chronic. Denies any issues with this. He is a [employer]. Does not interfere with daily functions. Recommend monitoring usage. Subsequent Annual Wellness Visit - Includes PPPS ()   14. Screening for malignant neoplasm of prostate  Subsequent Annual Wellness Visit - Includes PPPS ()   15. Chronic right shoulder pain   Chronic. Conservative management. Followed by [facility] for this.  Subsequent Annual Wellness Visit - Includes PPPS ()         Services suggested: No services needed at this time  Health Care Screening recommendations as per orders if indicated.  Referrals offered: PT/OT/Nutrition counseling/Behavioral Health/Smoking cessation as per orders if indicated.    Discussion today about general wellness and lifestyle habits:    · Prevent falls and reduce trip hazards; Cautioned about securing or removing rugs.  · Have a working fire alarm and carbon monoxide detector;   · Engage in regular physical activity and social activities       Follow-up: one year.

## 2021-08-09 NOTE — LETTER
[facility]  CELINE HernandezPAshleighRAshleighN.  [address] [facility] [address]  [place] [address]  Fax: [phone]   Authorization for Release/Disclosure of   Protected Health Information   Name: KRISTIAN ART : 1955 SSN: [id number]   Address: [address] [place] [address] [place] [address] Phone:    [phone] (home) [phone] (work)   I authorize the entity listed below to release/disclose the PHI below to:   [facility]/ALBERT Hernandez.P.R.BINH and TUNG HernandezRYOSHI   Provider or Entity Name:  [facility]   Address   City, State, Zip   Phone:      Fax:     Reason for request: continuity of care   Information to be released:    [  ] LAST COLONOSCOPY,  including any PATH REPORT and follow-up  [  ] LAST FIT/COLOGUARD RESULT [  ] LAST DEXA  [  ] LAST MAMMOGRAM  [  ] LAST PAP  [  ] LAST LABS [  ] RETINA EXAM REPORT  [  ] IMMUNIZATION RECORDS  [  ] Release all info      [  ] Check here and initial the line next to each item to release ALL health information INCLUDING  _____ Care and treatment for drug and / or alcohol abuse  _____ HIV testing, infection status, or AIDS  _____ Genetic Testing    DATES OF SERVICE OR TIME PERIOD TO BE DISCLOSED: _____________  I understand and acknowledge that:  * This Authorization may be revoked at any time by you in writing, except if your health information has already been used or disclosed.  * Your health information that will be used or disclosed as a result of you signing this authorization could be re-disclosed by the recipient. If this occurs, your re-disclosed health information may no longer be protected by State or Federal laws.  * You may refuse to sign this Authorization. Your refusal will not affect your ability to obtain treatment.  * This Authorization becomes effective upon signing and will  on (date) __________.      If no date is indicated, this Authorization will  one (1) year from the signature date.    Name: Kristian ALDRIDGE  Zac    Signature:   Date:     8/9/2021       PLEASE FAX REQUESTED RECORDS BACK TO: [phone]

## 2021-09-07 DIAGNOSIS — E11.9 TYPE 2 DIABETES MELLITUS WITH HEMOGLOBIN A1C GOAL OF LESS THAN 7.0% (HCC): ICD-10-CM

## 2021-09-07 RX ORDER — PIOGLITAZONEHYDROCHLORIDE 30 MG/1
30 TABLET ORAL DAILY
Qty: 100 TABLET | Refills: 3 | Status: SHIPPED | OUTPATIENT
Start: 2021-09-07 | End: 2022-10-18 | Stop reason: SDUPTHER

## 2021-09-20 ENCOUNTER — TELEPHONE (OUTPATIENT)
Dept: HEALTH INFORMATION MANAGEMENT | Facility: OTHER | Age: 66
End: 2021-09-20

## 2021-09-20 NOTE — TELEPHONE ENCOUNTER
Member would like information about where to get Medical Marijuana. Informed member that I would connect with him soon to follow up on his inquiry.

## 2021-09-23 NOTE — TELEPHONE ENCOUNTER
Left voicemail with mbr requesting a call back to follow-up on Issue.     Will inform mbr that [facility] do not prescribe medical marijuana and is not covered by Medicare during call back. Mbr can go through [place] to get a medical card for marijuana at this following link: [email or web address]

## 2021-09-29 DIAGNOSIS — E11.41 TYPE 2 DIABETES MELLITUS WITH DIABETIC MONONEUROPATHY, WITHOUT LONG-TERM CURRENT USE OF INSULIN (HCC): ICD-10-CM

## 2021-09-29 DIAGNOSIS — E03.4 HYPOTHYROIDISM DUE TO ACQUIRED ATROPHY OF THYROID: ICD-10-CM

## 2021-09-29 RX ORDER — SEMAGLUTIDE 1.34 MG/ML
1 INJECTION, SOLUTION SUBCUTANEOUS
Qty: 3 ML | Refills: 11 | Status: SHIPPED | OUTPATIENT
Start: 2021-09-29 | End: 2021-11-01 | Stop reason: SDUPTHER

## 2021-10-06 ENCOUNTER — HOSPITAL ENCOUNTER (OUTPATIENT)
Dept: LAB | Facility: MEDICAL CENTER | Age: 66
End: 2021-10-06
Attending: NURSE PRACTITIONER
Payer: MEDICARE

## 2021-10-06 DIAGNOSIS — E03.9 HYPOTHYROIDISM (ACQUIRED): ICD-10-CM

## 2021-10-06 LAB
T3FREE SERPL-MCNC: 2.99 PG/ML (ref 2–4.4)
T4 FREE SERPL-MCNC: 1.48 NG/DL (ref 0.93–1.7)
TSH SERPL DL<=0.005 MIU/L-ACNC: 0.05 UIU/ML (ref 0.38–5.33)

## 2021-10-06 PROCEDURE — 84481 FREE ASSAY (FT-3): CPT

## 2021-10-06 PROCEDURE — 84439 ASSAY OF FREE THYROXINE: CPT

## 2021-10-06 PROCEDURE — 84443 ASSAY THYROID STIM HORMONE: CPT

## 2021-10-06 PROCEDURE — 36415 COLL VENOUS BLD VENIPUNCTURE: CPT

## 2021-10-11 ENCOUNTER — OFFICE VISIT (OUTPATIENT)
Dept: ENDOCRINOLOGY | Facility: MEDICAL CENTER | Age: 66
End: 2021-10-11
Attending: NURSE PRACTITIONER
Payer: MEDICARE

## 2021-10-11 VITALS
SYSTOLIC BLOOD PRESSURE: 106 MMHG | WEIGHT: 179 LBS | OXYGEN SATURATION: 96 % | BODY MASS INDEX: 28.77 KG/M2 | HEIGHT: 66 IN | DIASTOLIC BLOOD PRESSURE: 60 MMHG | HEART RATE: 88 BPM

## 2021-10-11 DIAGNOSIS — E55.9 VITAMIN D DEFICIENCY: ICD-10-CM

## 2021-10-11 DIAGNOSIS — E11.41 TYPE 2 DIABETES MELLITUS WITH DIABETIC MONONEUROPATHY, WITHOUT LONG-TERM CURRENT USE OF INSULIN (HCC): ICD-10-CM

## 2021-10-11 DIAGNOSIS — E03.9 HYPOTHYROIDISM (ACQUIRED): ICD-10-CM

## 2021-10-11 DIAGNOSIS — E78.5 DYSLIPIDEMIA: ICD-10-CM

## 2021-10-11 LAB
HBA1C MFR BLD: 6.2 % (ref 0–5.6)
INT CON NEG: ABNORMAL
INT CON POS: ABNORMAL

## 2021-10-11 PROCEDURE — 99213 OFFICE O/P EST LOW 20 MIN: CPT | Performed by: NURSE PRACTITIONER

## 2021-10-11 PROCEDURE — 99214 OFFICE O/P EST MOD 30 MIN: CPT | Performed by: NURSE PRACTITIONER

## 2021-10-11 PROCEDURE — 83036 HEMOGLOBIN GLYCOSYLATED A1C: CPT | Performed by: NURSE PRACTITIONER

## 2021-10-11 ASSESSMENT — FIBROSIS 4 INDEX: FIB4 SCORE: 1.288461538461538462

## 2021-10-11 NOTE — PROGRESS NOTES
CHIEF COMPLAINT: Patient is here for follow up of Type 2 Diabetes Mellitus, Hypothyroidism, Dyslipidemia & Vitamin D Deficiency.     HPI:     Kristian Frank is a very pleasant 65 y.o. male with for continued evaluation & treatment of the followin. Type 2 Diabetes Mellitus   Current Diabetes Regimen:  GLP-1 Agent: Ozempic 1 mg daily  SGLT2-Jardiance 10mg daily  Metformin  mg 2 BID  Other: Actos 30 mg daily.     Hypoglycemia:  None. Patient is hypoglycemic aware.    POC A1c 10/11/2021: 6.2%  Point-of-care A1c 2021: 6.4%    BG Diary: 10/11/2021 Checks 1-2 times per week.  Before Breakfast: 107-116.    Weight is unchanged from last appointment.     Diabetes Complications   Retinopathy: No known retinopathy.  Last eye exam: Completed 2021 with Dr. Fuller.  Neuropathy: R foot numbness in feet r/t low back injury and pain.  Denies any foot wounds.  Exercise: Avid hiker and Cross Country Skier  Diet: Fair.    /60.  No current ACE or ARB therapy.     Ref. Range 6/10/2021 13:17   Micro Alb Creat Ratio Latest Ref Range: 0 - 30 mg/g 15   Creatinine, Urine Latest Units: mg/dL 79.39   Microalbumin, Urine Random Latest Units: mg/dL 1.2       2. Hypothyroidism   Currently taking levothyroxine 200mcg x 5 days. Changed from name brand synthroid 3 months ago.  Pt has been doing this schedule for years. Adequate compliance and he takes thyroid hormone on empty stomach 45 minutes prior to breakfast.   Denies heart palpitations, tremor and/or anxiousness. Patient denies cold intolerance, mental fogginess and constipation. Patient denies dysphagia, SOB and anterior neck pain.      Ref. Range 10/6/2021 07:20   TSH Latest Ref Range: 0.380 - 5.330 uIU/mL 0.050 (L)   Free T-4 Latest Ref Range: 0.93 - 1.70 ng/dL 1.48   T3,Free Latest Ref Range: 2.00 - 4.40 pg/mL 2.99       3. Dyslipidemia   Simvastatin 40mg QD.   Denies muscle weakness/fatigue.    Ref. Range 2/3/2021 06:59   Cholesterol,Tot Latest Ref  Range: 100 - 199 mg/dL 134   Triglycerides Latest Ref Range: 0 - 149 mg/dL 105   HDL Latest Ref Range: >=40 mg/dL 41   LDL Latest Ref Range: <100 mg/dL 72       4. Vitamin D Deficiency  Currently taking Vitamin D 3000IU daily.      Ref. Range 2/3/2021 06:59   25-Hydroxy   Vitamin D 25 Latest Ref Range: 30 - 100 ng/mL 78       Patient's medications, allergies, and social histories were reviewed and updated as appropriate.    ROS:     CONS:     No fever, no chills   EYES:     No diplopia, no blurry vision   CV:           No chest pain, no palpitations   PULM:     No SOB, no cough, no hemoptysis.   GI:            No nausea, no vomiting, no diarrhea, no constipation   ENDO:     No polyuria, no polydipsia, no heat intolerance, no cold intolerance       Past Medical History:  Problem List:  2021-08: Type 2 diabetes mellitus with diabetic mononeuropathy,   without long-term current use of insulin ([facility])  2020-05: Obesity (BMI 30-39.9)  2019-05: Uncomplicated alcohol dependence ([facility])  2019-05: Essential hypertension  2018-06: Obesity (BMI 30-39.9)  2018-03: Carotid atherosclerosis  2018-01: Left cervical radiculopathy  2018-01: Left hand weakness  2016-04: Allergic rhinitis  2016-04: Post-nasal drip  2016-04: Chronic bilateral low back pain with right-sided sciatica  2016-02: Abnormal EKG  2016-02: Cigarette nicotine dependence without complication  2016-02: Obesity (BMI 30.0-34.9)  2016-02: Epigastric abdominal pain  2016-02: Sleep apnea  2016-02: Tenosynovitis of wrist  2013-10: Type 2 diabetes mellitus with hemoglobin A1c goal of less   than 7.0% ([facility])  2012-12: Vitamin D deficiency disease  Dyslipidemia  DIABETES MELLITUS  Hypothyroid  Diverticulitis      Past Surgical History:  Past Surgical History:   Procedure Laterality Date   • APPENDECTOMY     • COLON RESECTION          Allergies:  Patient has no known allergies.     Social History:  Social History     Tobacco Use   • Smoking status: Current Every Day Smoker      "Packs/day: 0.50   • Smokeless tobacco: Never Used   Vaping Use   • Vaping Use: Never used   Substance Use Topics   • Alcohol use: Yes     Alcohol/week: 8.4 - 12.6 oz     Types: 14 - 21 Cans of beer per week     Comment: 2-3 a day   • Drug use: Yes     Frequency: 4.0 times per week     Types: Marijuana, Inhaled        Family History:   family history includes Cancer in his sister; Diabetes in his brother, mother, sister, and sister; Heart Attack in his father; Heart Disease in his father; Hyperlipidemia in his brother, father, mother, and sister; Hypertension in his brother, father, mother, and sister; Lung Disease in his sister.      PHYSICAL EXAM:   OBJECTIVE:  Vital signs: /60   Pulse 88   Ht 1.676 m (5' 6\")   Wt 81.2 kg (179 lb)   SpO2 96%   BMI 28.89 kg/m²   GENERAL: Well-developed, well-nourished in no apparent distress.   EYE:  No ocular asymmetry, PERRLA  HENT: Pink, moist mucous membranes.    NECK: No thyromegaly.   CARDIOVASCULAR:  No murmurs  LUNGS: Clear breath sounds  ABDOMEN: Soft, nontender   EXTREMITIES: No clubbing, cyanosis, or edema.   NEUROLOGICAL: No gross focal motor abnormalities   LYMPH: No cervical adenopathy palpated.   SKIN: No rashes, lesions.       ASSESSMENT/PLAN:     1. Type 2 diabetes mellitus without complication, without long-term current use of insulin (HCC)  Stable.  Continue the following regimen:   GLP-1 Agent: Ozempic 1 mg daily  SGLT2-Jardiance 10mg daily  Metformin  mg 2 BID  Other: Actos 30 mg daily.    Continue twice weekly glucose checks.   Recommend 2L-3L of water daily.   Continue to exercise 150 minutes per week.   Continue daily foot inspection.     2. Acquired hypothyroidism  Stable  Continue taking levothyroxine 200mcg 5 days per week.   Repeat labs before next appointment.       3. Dyslipidemia  Stable  Continue taking Simvastatin 40mg QD.    4. Vitamin D deficiency  Stable  Continue taking Vitamin D 1000IU QD.    Complete labs 1-2 weeks prior to " next appointment.   Complete POC A1c at next appointment.   Follow up appointment in 4 months.      Thank you kindly for allowing me to participate in the diabetes care plan for this patient.    Ashley Osborne, APRN  10/11/2021    CC:   LEON Hernandez

## 2021-10-11 NOTE — PROGRESS NOTES
"RN-CDE Note    Subjective:   Endocrinology Clinic Progress Note  PCP: LEON Hernandez    HPI:  Kristian Frank is a 66 y.o. old patient who is seen today for review of Type 2 Diabetes,Hypothyroidism, and Vitamin D Deficiency.  Recent changes in health: Right shoulder pain.  DM:   Last A1c:   Lab Results   Component Value Date/Time    HBA1C 6.2 (A) 10/11/2021 01:30 PM      Previous A1c was 6.4 on 6/14/21  A1C GOAL: < 7    Diabetes Medications:   Ozempic 1 mg weekly  Jardiance 10 mg daily  Metformin  mg 2 BID  Actos 30 mg daily      Exercise: Walking  Diet: \"healthy\" diet  in general  Patient's body mass index is 28.89 kg/m². Exercise and nutrition counseling were performed at this visit.    Glucose monitoring frequency: daily  107-116  Hypoglycemic episodes: no  Last Retinal Exam: on file and up-to-date  Daily Foot Exam: Yes   Foot Exam:  Monofilament: done  Monofilament testing with a 10 gram force: sensation intact: intact bilaterally  Visual Inspection: Feet without maceration, ulcers, fissures.  Pedal pulses: intact bilaterally   Lab Results   Component Value Date/Time    MICROALBCALC 5.9 10/16/2013 07:12 AM    MALBCRT 15 06/10/2021 01:17 PM    MICROALBUR 1.2 06/10/2021 01:17 PM    MICRALB 7.4 10/16/2013 07:12 AM        He  reports that he has been smoking. He has been smoking about 0.50 packs per day. He has never used smokeless tobacco.      Plan:     Discussed and educated on:   - All medications, side effects and compliance (discussed carefully)  - Annual eye examinations at Ophthalmology  - Home glucose monitoring emphasized  - Weight control and daily exercise    Recommended medication changes: No changes at this time.   "

## 2021-10-25 ENCOUNTER — NON-PROVIDER VISIT (OUTPATIENT)
Dept: MEDICAL GROUP | Facility: MEDICAL CENTER | Age: 66
End: 2021-10-25
Payer: MEDICARE

## 2021-10-25 DIAGNOSIS — Z23 NEED FOR VACCINATION: ICD-10-CM

## 2021-10-25 PROCEDURE — G0008 ADMIN INFLUENZA VIRUS VAC: HCPCS | Performed by: NURSE PRACTITIONER

## 2021-10-25 PROCEDURE — 90662 IIV NO PRSV INCREASED AG IM: CPT | Performed by: NURSE PRACTITIONER

## 2021-10-25 NOTE — PROGRESS NOTES
"Kristian Frank is a 66 y.o. male here for a non-provider visit for:   FLU    Reason for immunization: Annual Flu Vaccine  Immunization records indicate need for vaccine: Yes, confirmed with Epic  Minimum interval has been met for this vaccine: Yes  ABN completed: Not Indicated    VIS Dated  8/6/2021 was given to patient: Yes  All IAC Questionnaire questions were answered \"No.\"    Patient tolerated injection and no adverse effects were observed or reported: Yes    Pt scheduled for next dose in series: Not Indicated  "

## 2021-11-01 DIAGNOSIS — E11.41 TYPE 2 DIABETES MELLITUS WITH DIABETIC MONONEUROPATHY, WITHOUT LONG-TERM CURRENT USE OF INSULIN (HCC): ICD-10-CM

## 2021-11-01 RX ORDER — SEMAGLUTIDE 1.34 MG/ML
1 INJECTION, SOLUTION SUBCUTANEOUS
Qty: 3 ML | Refills: 11 | Status: SHIPPED | OUTPATIENT
Start: 2021-11-01 | End: 2022-03-23 | Stop reason: SDUPTHER

## 2021-11-01 NOTE — TELEPHONE ENCOUNTER
PATIENTS INSURANCE IS REQUESTING 90 DAY SUPPLY.     Received request via: Pharmacy    Was the patient seen in the last year in this department? Yes    Does the patient have an active prescription (recently filled or refills available) for medication(s) requested? No       REQUESTED MEDICATION:   Requested Prescriptions     Pending Prescriptions Disp Refills   • Semaglutide, 1 MG/DOSE, (OZEMPIC, 1 MG/DOSE,) 4 MG/3ML Solution Pen-injector 3 mL 11     Sig: Inject 1 mg under the skin every 7 days.

## 2021-12-17 DIAGNOSIS — E11.65 UNCONTROLLED TYPE 2 DIABETES MELLITUS WITH HYPERGLYCEMIA (HCC): ICD-10-CM

## 2021-12-17 RX ORDER — EMPAGLIFLOZIN 10 MG/1
1 TABLET, FILM COATED ORAL DAILY
Qty: 100 TABLET | Refills: 3 | Status: SHIPPED | OUTPATIENT
Start: 2021-12-17 | End: 2022-10-18 | Stop reason: SDUPTHER

## 2021-12-17 RX ORDER — EMPAGLIFLOZIN 10 MG/1
TABLET, FILM COATED ORAL
Qty: 90 TABLET | Refills: 3 | Status: SHIPPED | OUTPATIENT
Start: 2021-12-17 | End: 2021-12-17 | Stop reason: SDUPTHER

## 2022-02-16 ENCOUNTER — HOSPITAL ENCOUNTER (OUTPATIENT)
Dept: LAB | Facility: MEDICAL CENTER | Age: 67
End: 2022-02-16
Attending: NURSE PRACTITIONER
Payer: MEDICARE

## 2022-02-16 DIAGNOSIS — E11.41 TYPE 2 DIABETES MELLITUS WITH DIABETIC MONONEUROPATHY, WITHOUT LONG-TERM CURRENT USE OF INSULIN (HCC): ICD-10-CM

## 2022-02-16 LAB
25(OH)D3 SERPL-MCNC: 74 NG/ML (ref 30–100)
ALBUMIN SERPL BCP-MCNC: 4.7 G/DL (ref 3.2–4.9)
ALBUMIN/GLOB SERPL: 1.7 G/DL
ALP SERPL-CCNC: 61 U/L (ref 30–99)
ALT SERPL-CCNC: 19 U/L (ref 2–50)
ANION GAP SERPL CALC-SCNC: 16 MMOL/L (ref 7–16)
AST SERPL-CCNC: 16 U/L (ref 12–45)
BILIRUB SERPL-MCNC: 0.4 MG/DL (ref 0.1–1.5)
BUN SERPL-MCNC: 15 MG/DL (ref 8–22)
CALCIUM SERPL-MCNC: 9.8 MG/DL (ref 8.5–10.5)
CHLORIDE SERPL-SCNC: 102 MMOL/L (ref 96–112)
CHOLEST SERPL-MCNC: 161 MG/DL (ref 100–199)
CO2 SERPL-SCNC: 23 MMOL/L (ref 20–33)
CREAT SERPL-MCNC: 0.92 MG/DL (ref 0.5–1.4)
CREAT UR-MCNC: 78.38 MG/DL
FASTING STATUS PATIENT QL REPORTED: NORMAL
GLOBULIN SER CALC-MCNC: 2.8 G/DL (ref 1.9–3.5)
GLUCOSE SERPL-MCNC: 141 MG/DL (ref 65–99)
HDLC SERPL-MCNC: 50 MG/DL
LDLC SERPL CALC-MCNC: 85 MG/DL
MICROALBUMIN UR-MCNC: <1.2 MG/DL
MICROALBUMIN/CREAT UR: NORMAL MG/G (ref 0–30)
POTASSIUM SERPL-SCNC: 4.4 MMOL/L (ref 3.6–5.5)
PROT SERPL-MCNC: 7.5 G/DL (ref 6–8.2)
SODIUM SERPL-SCNC: 141 MMOL/L (ref 135–145)
T3FREE SERPL-MCNC: 3.12 PG/ML (ref 2–4.4)
T4 FREE SERPL-MCNC: 1.53 NG/DL (ref 0.93–1.7)
TRIGL SERPL-MCNC: 128 MG/DL (ref 0–149)
TSH SERPL DL<=0.005 MIU/L-ACNC: 0.12 UIU/ML (ref 0.38–5.33)

## 2022-02-16 PROCEDURE — 36415 COLL VENOUS BLD VENIPUNCTURE: CPT

## 2022-02-16 PROCEDURE — 80061 LIPID PANEL: CPT

## 2022-02-16 PROCEDURE — 82570 ASSAY OF URINE CREATININE: CPT

## 2022-02-16 PROCEDURE — 84443 ASSAY THYROID STIM HORMONE: CPT

## 2022-02-16 PROCEDURE — 82306 VITAMIN D 25 HYDROXY: CPT

## 2022-02-16 PROCEDURE — 80053 COMPREHEN METABOLIC PANEL: CPT

## 2022-02-16 PROCEDURE — 84481 FREE ASSAY (FT-3): CPT

## 2022-02-16 PROCEDURE — 82043 UR ALBUMIN QUANTITATIVE: CPT

## 2022-02-16 PROCEDURE — 84439 ASSAY OF FREE THYROXINE: CPT

## 2022-02-23 ENCOUNTER — OFFICE VISIT (OUTPATIENT)
Dept: ENDOCRINOLOGY | Facility: MEDICAL CENTER | Age: 67
End: 2022-02-23
Attending: NURSE PRACTITIONER
Payer: MEDICARE

## 2022-02-23 VITALS
HEART RATE: 93 BPM | WEIGHT: 181 LBS | DIASTOLIC BLOOD PRESSURE: 80 MMHG | OXYGEN SATURATION: 95 % | HEIGHT: 66 IN | BODY MASS INDEX: 29.09 KG/M2 | SYSTOLIC BLOOD PRESSURE: 122 MMHG

## 2022-02-23 DIAGNOSIS — E03.9 HYPOTHYROIDISM (ACQUIRED): ICD-10-CM

## 2022-02-23 DIAGNOSIS — E55.9 VITAMIN D DEFICIENCY: ICD-10-CM

## 2022-02-23 DIAGNOSIS — E11.41 TYPE 2 DIABETES MELLITUS WITH DIABETIC MONONEUROPATHY, WITHOUT LONG-TERM CURRENT USE OF INSULIN (HCC): ICD-10-CM

## 2022-02-23 DIAGNOSIS — E78.5 DYSLIPIDEMIA: ICD-10-CM

## 2022-02-23 LAB
HBA1C MFR BLD: 6.3 % (ref 0–5.6)
INT CON NEG: NEGATIVE
INT CON POS: POSITIVE

## 2022-02-23 PROCEDURE — 83036 HEMOGLOBIN GLYCOSYLATED A1C: CPT | Performed by: NURSE PRACTITIONER

## 2022-02-23 PROCEDURE — 99212 OFFICE O/P EST SF 10 MIN: CPT | Performed by: NURSE PRACTITIONER

## 2022-02-23 PROCEDURE — 99214 OFFICE O/P EST MOD 30 MIN: CPT | Performed by: NURSE PRACTITIONER

## 2022-02-23 ASSESSMENT — FIBROSIS 4 INDEX: FIB4 SCORE: 1.46

## 2022-02-23 NOTE — PROGRESS NOTES
CHIEF COMPLAINT: Patient is here for follow up of Type 2 Diabetes Mellitus, Hypothyroidism, Dyslipidemia & Vitamin D Deficiency.     HPI:     Kristian Frank is a very pleasant 66 y.o. male with for continued evaluation & treatment of the followin. Type 2 Diabetes Mellitus   Current Diabetes Regimen:  GLP-1 Agent: Ozempic 1 mg daily  SGLT2-Jardiance 10mg daily  Metformin  mg 2 BID  Other: Actos 30 mg daily.     Hypoglycemia:  None. Patient is hypoglycemic aware.    POC A1c 2021: 6.3%  POC A1c 10/11/2021: 6.2%  Point-of-care A1c 2021: 6.4%    BG Diary: 10/11/2021 Checks 1-2 times per week.  Before Breakfast: 107-116.    Weight is unchanged from last appointment.     Diabetes Complications   Retinopathy: No known retinopathy.  Last eye exam: Completed 2021 with Dr. Fuller.  Neuropathy: R foot numbness in feet r/t low back injury and pain.  Denies any foot wounds.  Exercise: Avid hiker and Cross Country Skier  Diet: Fair.    /80.  No current ACE or ARB therapy.     Ref. Range 6/10/2021 13:17   Micro Alb Creat Ratio Latest Ref Range: 0 - 30 mg/g 15   Creatinine, Urine Latest Units: mg/dL 79.39   Microalbumin, Urine Random Latest Units: mg/dL 1.2       2. Hypothyroidism   Currently taking levothyroxine 200mcg x 5 days. Changed from name brand synthroid 6 months ago.  Patient has been on this same dosage for several years.  Adequate compliance and he takes thyroid hormone on empty stomach 45 minutes prior to breakfast.   Denies heart palpitations, tremor and/or anxiousness. Patient denies cold intolerance, mental fogginess and constipation. Patient denies dysphagia, SOB and anterior neck pain.      Ref. Range 2022 07:37   TSH Latest Ref Range: 0.380 - 5.330 uIU/mL 0.120 (L)   Free T-4 Latest Ref Range: 0.93 - 1.70 ng/dL 1.53   T3,Free Latest Ref Range: 2.00 - 4.40 pg/mL 3.12         3. Dyslipidemia   Simvastatin 40mg QD. Patient has tolerated statin therapy well for over 2  years.   Denies myalgias and muscle cramps.       Ref. Range 2/3/2021 06:59   Cholesterol,Tot Latest Ref Range: 100 - 199 mg/dL 134   Triglycerides Latest Ref Range: 0 - 149 mg/dL 105   HDL Latest Ref Range: >=40 mg/dL 41   LDL Latest Ref Range: <100 mg/dL 72       4. Vitamin D Deficiency  Currently taking Vitamin D 3000IU daily.      Ref. Range 2/3/2021 06:59   25-Hydroxy   Vitamin D 25 Latest Ref Range: 30 - 100 ng/mL 78       Patient's medications, allergies, and social histories were reviewed and updated as appropriate.    ROS:     CONS:     No fever, no chills   EYES:     No diplopia, no blurry vision   CV:           No chest pain, no palpitations   PULM:     No SOB, no cough, no hemoptysis.   GI:            No nausea, no vomiting, no diarrhea, no constipation   ENDO:     No polyuria, no polydipsia, no heat intolerance, no cold intolerance       Past Medical History:  Problem List:  2021-08: Type 2 diabetes mellitus with diabetic mononeuropathy,   without long-term current use of insulin ([facility])  2020-05: Obesity (BMI 30-39.9)  2019-05: Uncomplicated alcohol dependence ([facility])  2019-05: Essential hypertension  2018-06: Obesity (BMI 30-39.9)  2018-03: Carotid atherosclerosis  2018-01: Left cervical radiculopathy  2018-01: Left hand weakness  2016-04: Allergic rhinitis  2016-04: Post-nasal drip  2016-04: Chronic bilateral low back pain with right-sided sciatica  2016-02: Abnormal EKG  2016-02: Cigarette nicotine dependence without complication  2016-02: Obesity (BMI 30.0-34.9)  2016-02: Epigastric abdominal pain  2016-02: Sleep apnea  2016-02: Tenosynovitis of wrist  2013-10: Type 2 diabetes mellitus with hemoglobin A1c goal of less   than 7.0% ([facility])  2012-12: Vitamin D deficiency disease  Dyslipidemia  DIABETES MELLITUS  Hypothyroid  Diverticulitis      Past Surgical History:  Past Surgical History:   Procedure Laterality Date   • APPENDECTOMY     • COLON RESECTION          Allergies:  Patient has no known  "allergies.     Social History:  Social History     Tobacco Use   • Smoking status: Current Every Day Smoker     Packs/day: 0.50     Years: 28.00     Pack years: 14.00   • Smokeless tobacco: Never Used   Vaping Use   • Vaping Use: Never used   Substance Use Topics   • Alcohol use: Yes     Alcohol/week: 8.4 - 12.6 oz     Types: 14 - 21 Cans of beer per week     Comment: 2-3 a day   • Drug use: Yes     Frequency: 4.0 times per week     Types: Marijuana, Inhaled        Family History:   family history includes Cancer in his sister; Diabetes in his brother, mother, sister, and sister; Heart Attack in his father; Heart Disease in his father; Hyperlipidemia in his brother, father, mother, and sister; Hypertension in his brother, father, mother, and sister; Lung Disease in his sister.      PHYSICAL EXAM:   OBJECTIVE:  Vital signs: /80 (BP Location: Right arm, Patient Position: Sitting, BP Cuff Size: Adult)   Pulse 93   Ht 1.676 m (5' 6\")   Wt 82.1 kg (181 lb)   SpO2 95%   BMI 29.21 kg/m²   GENERAL: Well-developed, well-nourished in no apparent distress.   EYE:  No ocular asymmetry, PERRLA  HENT: Pink, moist mucous membranes.    NECK: No thyromegaly.   CARDIOVASCULAR:  No murmurs  LUNGS: Clear breath sounds  ABDOMEN: Soft, nontender   EXTREMITIES: No clubbing, cyanosis, or edema.   NEUROLOGICAL: No gross focal motor abnormalities   LYMPH: No cervical adenopathy palpated.   SKIN: No rashes, lesions.       ASSESSMENT/PLAN:     1. Type 2 diabetes mellitus without complication, without long-term current use of insulin (HCC)  Stable.  Continue the following regimen:   GLP-1 Agent: Ozempic 1 mg daily  SGLT2-Jardiance 10mg daily  Metformin  mg 2 BID  Other: Actos 30 mg daily.    Continue twice weekly glucose checks.   Recommend 2L-3L of water daily.   Continue to exercise 150 minutes per week.   Continue daily foot inspection.     2. Acquired hypothyroidism  Stable  Continue taking levothyroxine 200mcg 5 days per " week.   Repeat labs before next appointment.       3. Dyslipidemia  Stable  Continue taking Simvastatin 40mg QD.      4. Vitamin D deficiency  Stable  Continue taking Vitamin D 3000IU QD.      Complete POC A1c at next appointment.   Follow up appointment in 6 months.      Thank you kindly for allowing me to participate in the diabetes care plan for this patient.    Ashley Osborne, APRN  02/23/2022    CC:   LEON Hernandez

## 2022-03-08 PROBLEM — G31.84 MILD COGNITIVE IMPAIRMENT: Status: ACTIVE | Noted: 2022-03-08

## 2022-03-08 PROBLEM — G58.9 MONONEUROPATHY: Status: ACTIVE | Noted: 2022-03-08

## 2022-03-14 ENCOUNTER — APPOINTMENT (RX ONLY)
Dept: URBAN - METROPOLITAN AREA CLINIC 22 | Facility: CLINIC | Age: 67
Setting detail: DERMATOLOGY
End: 2022-03-14

## 2022-03-14 DIAGNOSIS — Z85.828 PERSONAL HISTORY OF OTHER MALIGNANT NEOPLASM OF SKIN: ICD-10-CM

## 2022-03-14 DIAGNOSIS — Z71.89 OTHER SPECIFIED COUNSELING: ICD-10-CM

## 2022-03-14 DIAGNOSIS — D18.0 HEMANGIOMA: ICD-10-CM

## 2022-03-14 DIAGNOSIS — B35.3 TINEA PEDIS: ICD-10-CM | Status: INADEQUATELY CONTROLLED

## 2022-03-14 DIAGNOSIS — L82.1 OTHER SEBORRHEIC KERATOSIS: ICD-10-CM

## 2022-03-14 DIAGNOSIS — D22 MELANOCYTIC NEVI: ICD-10-CM

## 2022-03-14 DIAGNOSIS — L81.4 OTHER MELANIN HYPERPIGMENTATION: ICD-10-CM

## 2022-03-14 DIAGNOSIS — L57.0 ACTINIC KERATOSIS: ICD-10-CM

## 2022-03-14 PROBLEM — D48.5 NEOPLASM OF UNCERTAIN BEHAVIOR OF SKIN: Status: ACTIVE | Noted: 2022-03-14

## 2022-03-14 PROBLEM — D22.5 MELANOCYTIC NEVI OF TRUNK: Status: ACTIVE | Noted: 2022-03-14

## 2022-03-14 PROBLEM — D18.01 HEMANGIOMA OF SKIN AND SUBCUTANEOUS TISSUE: Status: ACTIVE | Noted: 2022-03-14

## 2022-03-14 PROCEDURE — 17003 DESTRUCT PREMALG LES 2-14: CPT

## 2022-03-14 PROCEDURE — ? BIOPSY BY SHAVE METHOD

## 2022-03-14 PROCEDURE — 17000 DESTRUCT PREMALG LESION: CPT

## 2022-03-14 PROCEDURE — ? LIQUID NITROGEN

## 2022-03-14 PROCEDURE — ? SUNSCREEN RECOMMENDATIONS

## 2022-03-14 PROCEDURE — ? PHOTO-DOCUMENTATION

## 2022-03-14 PROCEDURE — 99213 OFFICE O/P EST LOW 20 MIN: CPT | Mod: 25

## 2022-03-14 PROCEDURE — 69100 BIOPSY OF EXTERNAL EAR: CPT | Mod: 59

## 2022-03-14 PROCEDURE — ? COUNSELING

## 2022-03-14 PROCEDURE — ? ADDITIONAL NOTES

## 2022-03-14 ASSESSMENT — LOCATION SIMPLE DESCRIPTION DERM
LOCATION SIMPLE: LEFT EAR
LOCATION SIMPLE: RIGHT FOREHEAD
LOCATION SIMPLE: RIGHT THIGH
LOCATION SIMPLE: LEFT FOREHEAD
LOCATION SIMPLE: RIGHT PLANTAR SURFACE
LOCATION SIMPLE: LEFT UPPER BACK
LOCATION SIMPLE: RIGHT EAR
LOCATION SIMPLE: LEFT PLANTAR SURFACE
LOCATION SIMPLE: ABDOMEN
LOCATION SIMPLE: RIGHT LOWER BACK

## 2022-03-14 ASSESSMENT — LOCATION DETAILED DESCRIPTION DERM
LOCATION DETAILED: RIGHT SUPERIOR MEDIAL MIDBACK
LOCATION DETAILED: LEFT MEDIAL PLANTAR MIDFOOT
LOCATION DETAILED: RIGHT MEDIAL PLANTAR MIDFOOT
LOCATION DETAILED: LEFT FOREHEAD
LOCATION DETAILED: LEFT SUPERIOR MEDIAL UPPER BACK
LOCATION DETAILED: RIGHT MEDIAL FOREHEAD
LOCATION DETAILED: EPIGASTRIC SKIN
LOCATION DETAILED: RIGHT ANTERIOR PROXIMAL THIGH
LOCATION DETAILED: LEFT INFERIOR HELIX
LOCATION DETAILED: RIGHT SUPERIOR CRUS OF ANTIHELIX
LOCATION DETAILED: LEFT SUPERIOR HELIX
LOCATION DETAILED: RIGHT FOREHEAD
LOCATION DETAILED: RIGHT INFERIOR FOREHEAD

## 2022-03-14 ASSESSMENT — LOCATION ZONE DERM
LOCATION ZONE: TRUNK
LOCATION ZONE: FACE
LOCATION ZONE: EAR
LOCATION ZONE: LEG
LOCATION ZONE: FEET

## 2022-03-14 NOTE — PROCEDURE: ADDITIONAL NOTES
Render Risk Assessment In Note?: no
Additional Notes: Declined prescription medication.
Detail Level: Zone

## 2022-03-14 NOTE — PROCEDURE: LIQUID NITROGEN
Render Note In Bullet Format When Appropriate: No
Detail Level: Detailed
Number Of Freeze-Thaw Cycles: 2 freeze-thaw cycles
Duration Of Freeze Thaw-Cycle (Seconds): 3
Show Applicator Variable?: Yes
Post-Care Instructions: I reviewed with the patient in detail post-care instructions. Patient is to wear sunprotection, and avoid picking at any of the treated lesions. Pt may apply Vaseline to crusted or scabbing areas.
Consent: The patient's consent was obtained including but not limited to risks of crusting, scabbing, blistering, scarring, darker or lighter pigmentary change, recurrence, incomplete removal and infection.

## 2022-03-23 DIAGNOSIS — E11.41 TYPE 2 DIABETES MELLITUS WITH DIABETIC MONONEUROPATHY, WITHOUT LONG-TERM CURRENT USE OF INSULIN (HCC): ICD-10-CM

## 2022-03-24 RX ORDER — SEMAGLUTIDE 1.34 MG/ML
1 INJECTION, SOLUTION SUBCUTANEOUS
Qty: 3 ML | Refills: 11 | Status: SHIPPED | OUTPATIENT
Start: 2022-03-24

## 2022-04-05 ENCOUNTER — APPOINTMENT (RX ONLY)
Dept: URBAN - METROPOLITAN AREA CLINIC 22 | Facility: CLINIC | Age: 67
Setting detail: DERMATOLOGY
End: 2022-04-05

## 2022-04-05 PROBLEM — D04.22 CARCINOMA IN SITU OF SKIN OF LEFT EAR AND EXTERNAL AURICULAR CANAL: Status: ACTIVE | Noted: 2022-04-05

## 2022-04-05 PROCEDURE — ? PRESCRIPTION

## 2022-04-05 PROCEDURE — ? MOHS SURGERY

## 2022-04-05 PROCEDURE — 17312 MOHS ADDL STAGE: CPT

## 2022-04-05 PROCEDURE — 17311 MOHS 1 STAGE H/N/HF/G: CPT

## 2022-04-05 PROCEDURE — 13152 CMPLX RPR E/N/E/L 2.6-7.5 CM: CPT

## 2022-04-05 RX ORDER — DOXYCYCLINE HYCLATE 100 MG/1
1 CAPSULE, GELATIN COATED ORAL BID
Qty: 20 | Refills: 0 | Status: ERX | COMMUNITY
Start: 2022-04-05

## 2022-04-05 RX ADMIN — DOXYCYCLINE HYCLATE 1: 100 CAPSULE, GELATIN COATED ORAL at 00:00

## 2022-04-05 NOTE — PROCEDURE: MOHS SURGERY
Quadrants Reporting?: 0
Oculoplastic Surgeon Procedure Text (A): After obtaining clear surgical margins the patient was sent to oculoplastics for surgical repair.  The patient understands they will receive post-surgical care and follow-up from the referring physician's office.
Quadrant Reporting?: no
Ear Wedge Repair Text: A wedge excision was completed by carrying down an excision through the full thickness of the ear and cartilage with an inward facing Burow's triangle. The wound was then closed in a layered fashion.
Stage 1: Number Of Blocks?: 1
Consent (Nose)/Introductory Paragraph: The rationale for Mohs was explained to the patient and consent was obtained. The risks, benefits and alternatives to therapy were discussed in detail. Specifically, the risks of nasal deformity, changes in the flow of air through the nose, infection, scarring, bleeding, prolonged wound healing, incomplete removal, allergy to anesthesia, nerve injury and recurrence were addressed. Prior to the procedure, the treatment site was clearly identified and confirmed by the patient. All components of Universal Protocol/PAUSE Rule completed.
Closure 3 Information: This tab is for additional flaps and grafts above and beyond our usual structured repairs.  Please note if you enter information here it will not currently bill and you will need to add the billing information manually.
Show Mohs Rapid Report Variable In The Stage Tabs (Ensure You Have This How You Like Before You Hide): Yes
Asc Procedure Text (D): After obtaining clear surgical margins the patient was sent to an ASC for surgical repair.  The patient understands they will receive post-surgical care and follow-up from the ASC physician.
Stage 7: Additional Anesthesia Type: 1% lidocaine with epinephrine
O-L Flap Text: The defect edges were debeveled with a #15 scalpel blade.  Given the location of the defect, shape of the defect and the proximity to free margins an O-L flap was deemed most appropriate.  Using a sterile surgical marker, an appropriate advancement flap was drawn incorporating the defect and placing the expected incisions within the relaxed skin tension lines where possible.    The area thus outlined was incised deep to adipose tissue with a #15 scalpel blade.  The skin margins were undermined to an appropriate distance in all directions utilizing iris scissors.
Muscle Hinge Flap Text: The defect edges were debeveled with a #15 scalpel blade.  Given the size, depth and location of the defect and the proximity to free margins a muscle hinge flap was deemed most appropriate.  Using a sterile surgical marker, an appropriate hinge flap was drawn incorporating the defect. The area thus outlined was incised with a #15 scalpel blade.  The skin margins were undermined to an appropriate distance in all directions utilizing iris scissors.
O-Z Flap Text: The defect edges were debeveled with a #15 scalpel blade.  Given the location of the defect, shape of the defect and the proximity to free margins an O-Z flap was deemed most appropriate.  Using a sterile surgical marker, an appropriate transposition flap was drawn incorporating the defect and placing the expected incisions within the relaxed skin tension lines where possible. The area thus outlined was incised deep to adipose tissue with a #15 scalpel blade.  The skin margins were undermined to an appropriate distance in all directions utilizing iris scissors.
Transposition Flap Text: The defect edges were debeveled with a #15 scalpel blade.  Given the location of the defect and the proximity to free margins a transposition flap was deemed most appropriate.  Using a sterile surgical marker, an appropriate transposition flap was drawn incorporating the defect.    The area thus outlined was incised deep to adipose tissue with a #15 scalpel blade.  The skin margins were undermined to an appropriate distance in all directions utilizing iris scissors.
Anesthesia Type: 1% lidocaine with epinephrine and 0.25% bupivacaine in a 2:3 ration buffered with 8.4% sodium bicarbonate
Burow's Graft Text: The defect edges were debeveled with a #15 scalpel blade.  Given the location of the defect, shape of the defect, the proximity to free margins and the presence of a standing cone deformity a Burow's skin graft was deemed most appropriate. The standing cone was removed and this tissue was then trimmed to the shape of the primary defect. The adipose tissue was also removed until only dermis and epidermis were left.  The skin margins of the secondary defect were undermined to an appropriate distance in all directions utilizing iris scissors.  The secondary defect was closed with interrupted buried subcutaneous sutures.  The skin edges were then re-apposed with running  sutures.  The skin graft was then placed in the primary defect and oriented appropriately.
Mustarde Flap Text: The defect edges were debeveled with a #15 scalpel blade.  Given the size, depth and location of the defect and the proximity to free margins a Mustarde flap was deemed most appropriate.  Using a sterile surgical marker, an appropriate flap was drawn incorporating the defect. The area thus outlined was incised with a #15 scalpel blade.  The skin margins were undermined to an appropriate distance in all directions utilizing iris scissors.
Eye Protection Verbiage: Before proceeding with the stage, a plastic scleral shield was inserted. The globe was anesthetized with a few drops of 1% lidocaine with 1:100,000 epinephrine. Then, an appropriate sized scleral shield was chosen and coated with lacrilube ointment. The shield was gently inserted and left in place for the duration of each stage. After the stage was completed, the shield was gently removed.
Special Stains Stage 12 - Results: Base On Clearance Noted Above
Mohs Case Number: [id number]
Cheiloplasty (Complex) Text: A decision was made to reconstruct the defect with a  cheiloplasty.  The defect was undermined extensively.  Additional obicularis oris muscle was excised with a 15 blade scalpel.  The defect was converted into a full thickness wedge to facilite a better cosmetic result.  Small vessels were then tied off with 5-0 monocyrl. The obicularis oris, superficial fascia, adipose and dermis were then reapproximated.  After the deeper layers were approximated the epidermis was reapproximated with particular care given to realign the vermilion border.
Referring Physician (Optional): MYLENE Lindsey
Otolaryngologist Procedure Text (F): After obtaining clear surgical margins the patient was sent to otolaryngology for surgical repair.  The patient understands they will receive post-surgical care and follow-up from the referring physician's office.
Location Indication Override (Is Already Calculated Based On Selected Body Location): Area H
Bi-Rhombic Flap Text: The defect edges were debeveled with a #15 scalpel blade.  Given the location of the defect and the proximity to free margins a bi-rhombic flap was deemed most appropriate.  Using a sterile surgical marker, an appropriate rhombic flap was drawn incorporating the defect. The area thus outlined was incised deep to adipose tissue with a #15 scalpel blade.  The skin margins were undermined to an appropriate distance in all directions utilizing iris scissors.
Nasalis-Muscle-Based Myocutaneous Island Pedicle Flap Text: Using a #15 blade, an incision was made around the donor flap to the level of the nasalis muscle. Wide lateral undermining was then performed in both the subcutaneous plane above the nasalis muscle, and in a submuscular plane just above periosteum. This allowed the formation of a free nasalis muscle axial pedicle (based on the angular artery) which was still attached to the actual cutaneous flap, increasing its mobility and vascular viability. Hemostasis was obtained with pinpoint electrocoagulation. The flap was mobilized into position and the pivotal anchor points positioned and stabilized with buried interrupted sutures. Subcutaneous and dermal tissues were closed in a multilayered fashion with sutures. Tissue redundancies were excised, and the epidermal edges were apposed without significant tension and sutured with sutures.
Cheiloplasty (Less Than 50%) Text: A decision was made to reconstruct the defect with a  cheiloplasty.  The defect was undermined extensively.  Additional obicularis oris muscle was excised with a 15 blade scalpel.  The defect was converted into a full thickness wedge, of less than 50% of the vertical height of the lip, to facilite a better cosmetic result.  Small vessels were then tied off with 5-0 monocyrl. The obicularis oris, superficial fascia, adipose and dermis were then reapproximated.  After the deeper layers were approximated the epidermis was reapproximated with particular care given to realign the vermilion border.
Mohs Rapid Report Verbiage: The area of clinically evident tumor was marked with skin marking ink and appropriately hatched.  The initial incision was made following the Mohs approach through the skin.  The specimen was taken to the lab, divided into the necessary number of pieces, chromacoded and processed according to the Mohs protocol.  This was repeated in successive stages until a tumor free defect was achieved.
Plastic Surgeon Procedure Text (C): After obtaining clear surgical margins the patient was sent to plastics for surgical repair.  The patient understands they will receive post-surgical care and follow-up from the referring physician's office.
Chonodrocutaneous Helical Advancement Flap Text: The defect edges were debeveled with a #15 scalpel blade.  Given the location of the defect and the proximity to free margins a chondrocutaneous helical advancement flap was deemed most appropriate.  Using a sterile surgical marker, the appropriate advancement flap was drawn incorporating the defect and placing the expected incisions within the relaxed skin tension lines where possible.    The area thus outlined was incised deep to adipose tissue with a #15 scalpel blade.  The skin margins were undermined to an appropriate distance in all directions utilizing iris scissors.
Vermilion Border Text: The closure involved the vermilion border.
Epidermal Closure Graft Donor Site (Optional): running
Mastoid Interpolation Flap Text: A decision was made to reconstruct the defect utilizing an interpolation axial flap and a staged reconstruction.  A telfa template was made of the defect.  This telfa template was then used to outline the mastoid interpolation flap.  The donor area for the pedicle flap was then injected with anesthesia.  The flap was excised through the skin and subcutaneous tissue down to the layer of the underlying musculature.  The pedicle flap was carefully excised within this deep plane to maintain its blood supply.  The edges of the donor site were undermined.   The donor site was closed in a primary fashion.  The pedicle was then rotated into position and sutured.  Once the tube was sutured into place, adequate blood supply was confirmed with blanching and refill.  The pedicle was then wrapped with xeroform gauze and dressed appropriately with a telfa and gauze bandage to ensure continued blood supply and protect the attached pedicle.
Consent 3/Introductory Paragraph: I gave the patient a chance to ask questions they had about the procedure.  Following this I explained the Mohs procedure and consent was obtained. The risks, benefits and alternatives to therapy were discussed in detail. Specifically, the risks of infection, scarring, bleeding, prolonged wound healing, incomplete removal, allergy to anesthesia, nerve injury and recurrence were addressed. Prior to the procedure, the treatment site was clearly identified and confirmed by the patient. All components of Universal Protocol/PAUSE Rule completed.
Where Do You Want The Question To Include Opioid Counseling Located?: Case Summary Tab
Epidermal Autograft Text: The defect edges were debeveled with a #15 scalpel blade.  Given the location of the defect, shape of the defect and the proximity to free margins an epidermal autograft was deemed most appropriate.  Using a sterile surgical marker, the primary defect shape was transferred to the donor site. The epidermal graft was then harvested.  The skin graft was then placed in the primary defect and oriented appropriately.
Island Pedicle Flap-Requiring Vessel Identification Text: The defect edges were debeveled with a #15 scalpel blade.  Given the location of the defect, shape of the defect and the proximity to free margins an island pedicle advancement flap was deemed most appropriate.  Using a sterile surgical marker, an appropriate advancement flap was drawn, based on the axial vessel mentioned above, incorporating the defect, outlining the appropriate donor tissue and placing the expected incisions within the relaxed skin tension lines where possible.    The area thus outlined was incised deep to adipose tissue with a #15 scalpel blade.  The skin margins were undermined to an appropriate distance in all directions around the primary defect and laterally outward around the island pedicle utilizing iris scissors.  There was minimal undermining beneath the pedicle flap.
No Repair - Repaired With Adjacent Surgical Defect Text (Leave Blank If You Do Not Want): After obtaining clear surgical margins the defect was repaired concurrently with another surgical defect which was in close approximation.
Ftsg Text: The defect edges were debeveled with a #15 scalpel blade.  Given the location of the defect, shape of the defect and the proximity to free margins a full thickness skin graft was deemed most appropriate.  Using a sterile surgical marker, the primary defect shape was transferred to the donor site. The area thus outlined was incised deep to adipose tissue with a #15 scalpel blade.  The harvested graft was then trimmed of adipose tissue until only dermis and epidermis was left.  The skin margins of the secondary defect were undermined to an appropriate distance in all directions utilizing iris scissors.  The secondary defect was closed with interrupted buried subcutaneous sutures.  The skin edges were then re-apposed with running  sutures.  The skin graft was then placed in the primary defect and oriented appropriately.
Consent 2/Introductory Paragraph: Mohs surgery was explained to the patient and consent was obtained. The risks, benefits and alternatives to therapy were discussed in detail. Specifically, the risks of infection, scarring, bleeding, prolonged wound healing, incomplete removal, allergy to anesthesia, nerve injury and recurrence were addressed. Prior to the procedure, the treatment site was clearly identified and confirmed by the patient. All components of Universal Protocol/PAUSE Rule completed.
Double O-Z Flap Text: The defect edges were debeveled with a #15 scalpel blade.  Given the location of the defect, shape of the defect and the proximity to free margins a Double O-Z flap was deemed most appropriate.  Using a sterile surgical marker, an appropriate transposition flap was drawn incorporating the defect and placing the expected incisions within the relaxed skin tension lines where possible. The area thus outlined was incised deep to adipose tissue with a #15 scalpel blade.  The skin margins were undermined to an appropriate distance in all directions utilizing iris scissors.
Pain Refusal Text: I offered to prescribe pain medication but the patient refused to take this medication.
Why Was The Change Made?: Please Select the Appropriate Response
Purse String (Intermediate) Text: Given the location of the defect and the characteristics of the surrounding skin a purse string intermediate closure was deemed most appropriate.  Undermining was performed circumfirentially around the surgical defect.  A purse string suture was then placed and tightened.
M-Plasty Complex Repair Preamble Text (Leave Blank If You Do Not Want): Extensive wide undermining was performed.
Rhomboid Transposition Flap Text: The defect edges were debeveled with a #15 scalpel blade.  Given the location of the defect and the proximity to free margins a rhomboid transposition flap was deemed most appropriate.  Using a sterile surgical marker, an appropriate rhomboid flap was drawn incorporating the defect.    The area thus outlined was incised deep to adipose tissue with a #15 scalpel blade.  The skin margins were undermined to an appropriate distance in all directions utilizing iris scissors.
Rhombic Flap Text: The defect edges were debeveled with a #15 scalpel blade.  Given the location of the defect and the proximity to free margins a rhombic flap was deemed most appropriate.  Using a sterile surgical marker, an appropriate rhombic flap was drawn incorporating the defect.    The area thus outlined was incised deep to adipose tissue with a #15 scalpel blade.  The skin margins were undermined to an appropriate distance in all directions utilizing iris scissors.
Split-Thickness Skin Graft Text: The defect edges were debeveled with a #15 scalpel blade.  Given the location of the defect, shape of the defect and the proximity to free margins a split thickness skin graft was deemed most appropriate.  Using a sterile surgical marker, the primary defect shape was transferred to the donor site. The split thickness graft was then harvested.  The skin graft was then placed in the primary defect and oriented appropriately.
Crescentic Advancement Flap Text: The defect edges were debeveled with a #15 scalpel blade.  Given the location of the defect and the proximity to free margins a crescentic advancement flap was deemed most appropriate.  Using a sterile surgical marker, the appropriate advancement flap was drawn incorporating the defect and placing the expected incisions within the relaxed skin tension lines where possible.    The area thus outlined was incised deep to adipose tissue with a #15 scalpel blade.  The skin margins were undermined to an appropriate distance in all directions utilizing iris scissors.
Retention Suture Bite Size: 1 mm
Complex Requirements: Involvement Of Free Margin?: Helical Rim
Tarsorrhaphy Text: A tarsorrhaphy was performed using Frost sutures.
Information: Selecting Yes will display possible errors in your note based on the variables you have selected. This validation is only offered as a suggestion for you. PLEASE NOTE THAT THE VALIDATION TEXT WILL BE REMOVED WHEN YOU FINALIZE YOUR NOTE. IF YOU WANT TO FAX A PRELIMINARY NOTE YOU WILL NEED TO TOGGLE THIS TO 'NO' IF YOU DO NOT WANT IT IN YOUR FAXED NOTE.
A-T Advancement Flap Text: The defect edges were debeveled with a #15 scalpel blade.  Given the location of the defect, shape of the defect and the proximity to free margins an A-T advancement flap was deemed most appropriate.  Using a sterile surgical marker, an appropriate advancement flap was drawn incorporating the defect and placing the expected incisions within the relaxed skin tension lines where possible.    The area thus outlined was incised deep to adipose tissue with a #15 scalpel blade.  The skin margins were undermined to an appropriate distance in all directions utilizing iris scissors.
Nostril Rim Text: The closure involved the nostril rim.
Bilateral Helical Rim Advancement Flap Text: The defect edges were debeveled with a #15 blade scalpel.  Given the location of the defect and the proximity to free margins (helical rim) a bilateral helical rim advancement flap was deemed most appropriate.  Using a sterile surgical marker, the appropriate advancement flaps were drawn incorporating the defect and placing the expected incisions between the helical rim and antihelix where possible.  The area thus outlined was incised through and through with a #15 scalpel blade.  With a skin hook and iris scissors, the flaps were gently and sharply undermined and freed up.
Hemigard Retention Suture: 0-0 Nylon
Interpolation Flap Text: A decision was made to reconstruct the defect utilizing an interpolation axial flap and a staged reconstruction.  A telfa template was made of the defect.  This telfa template was then used to outline the interpolation flap.  The donor area for the pedicle flap was then injected with anesthesia.  The flap was excised through the skin and subcutaneous tissue down to the layer of the underlying musculature.  The interpolation flap was carefully excised within this deep plane to maintain its blood supply.  The edges of the donor site were undermined.   The donor site was closed in a primary fashion.  The pedicle was then rotated into position and sutured.  Once the tube was sutured into place, adequate blood supply was confirmed with blanching and refill.  The pedicle was then wrapped with xeroform gauze and dressed appropriately with a telfa and gauze bandage to ensure continued blood supply and protect the attached pedicle.
Dressing (No Sutures): pressure dressing with telfa
Consent (Temporal Branch)/Introductory Paragraph: The rationale for Mohs was explained to the patient and consent was obtained. The risks, benefits and alternatives to therapy were discussed in detail. Specifically, the risks of damage to the temporal branch of the facial nerve, infection, scarring, bleeding, prolonged wound healing, incomplete removal, allergy to anesthesia, and recurrence were addressed. Prior to the procedure, the treatment site was clearly identified and confirmed by the patient. All components of Universal Protocol/PAUSE Rule completed.
Zygomaticofacial Flap Text: Given the location of the defect, shape of the defect and the proximity to free margins a zygomaticofacial flap was deemed most appropriate for repair.  Using a sterile surgical marker, the appropriate flap was drawn incorporating the defect and placing the expected incisions within the relaxed skin tension lines where possible. The area thus outlined was incised deep to adipose tissue with a #15 scalpel blade with preservation of a vascular pedicle.  The skin margins were undermined to an appropriate distance in all directions utilizing iris scissors.  The flap was then placed into the defect and anchored with interrupted buried subcutaneous sutures.
Suturegard Body: The suture ends were repeatedly re-tightened and re-clamped to achieve the desired tissue expansion.
Hemostasis: Electrocautery
V-Y Flap Text: The defect edges were debeveled with a #15 scalpel blade.  Given the location of the defect, shape of the defect and the proximity to free margins a V-Y flap was deemed most appropriate.  Using a sterile surgical marker, an appropriate advancement flap was drawn incorporating the defect and placing the expected incisions within the relaxed skin tension lines where possible.    The area thus outlined was incised deep to adipose tissue with a #15 scalpel blade.  The skin margins were undermined to an appropriate distance in all directions utilizing iris scissors.
Surgeon Performing Repair (Optional): Floyd Jackson MD
Area H Indication Text: Tumors in this location are included in Area H (eyelids, eyebrows, nose, lips, chin, ear, pre-auricular, post-auricular, temple, genitalia, hands, feet, ankles and areola).  Tissue conservation is critical in these anatomic locations.
Primary Defect Length In Cm (Final Defect Size - Required For Flaps/Grafts): 1.4
Post-Care Instructions: I reviewed with the patient in detail post-care instructions. Patient is not to engage in any heavy lifting, exercise, or swimming for the next 14 days. Should the patient develop any fevers, chills, bleeding, severe pain patient will contact the office immediately.
M-Plasty Intermediate Repair Preamble Text (Leave Blank If You Do Not Want): Undermining was performed with blunt dissection.
Xenograft Text: The defect edges were debeveled with a #15 scalpel blade.  Given the location of the defect, shape of the defect and the proximity to free margins a xenograft was deemed most appropriate.  The graft was then trimmed to fit the size of the defect.  The graft was then placed in the primary defect and oriented appropriately.
S Plasty Text: Given the location and shape of the defect, and the orientation of relaxed skin tension lines, an S-plasty was deemed most appropriate for repair.  Using a sterile surgical marker, the appropriate outline of the S-plasty was drawn, incorporating the defect and placing the expected incisions within the relaxed skin tension lines where possible.  The area thus outlined was incised deep to adipose tissue with a #15 scalpel blade.  The skin margins were undermined to an appropriate distance in all directions utilizing iris scissors. The skin flaps were advanced over the defect.  The opposing margins were then approximated with interrupted buried subcutaneous sutures.
Double O-Z Plasty Text: The defect edges were debeveled with a #15 scalpel blade.  Given the location of the defect, shape of the defect and the proximity to free margins a Double O-Z plasty (double transposition flap) was deemed most appropriate.  Using a sterile surgical marker, the appropriate transposition flaps were drawn incorporating the defect and placing the expected incisions within the relaxed skin tension lines where possible. The area thus outlined was incised deep to adipose tissue with a #15 scalpel blade.  The skin margins were undermined to an appropriate distance in all directions utilizing iris scissors.  Hemostasis was achieved with electrocautery.  The flaps were then transposed into place, one clockwise and the other counterclockwise, and anchored with interrupted buried subcutaneous sutures.
H Plasty Text: Given the location of the defect, shape of the defect and the proximity to free margins a H-plasty was deemed most appropriate for repair.  Using a sterile surgical marker, the appropriate advancement arms of the H-plasty were drawn incorporating the defect and placing the expected incisions within the relaxed skin tension lines where possible. The area thus outlined was incised deep to adipose tissue with a #15 scalpel blade. The skin margins were undermined to an appropriate distance in all directions utilizing iris scissors.  The opposing advancement arms were then advanced into place in opposite direction and anchored with interrupted buried subcutaneous sutures.
Repair Anesthesia Method: local infiltration
Nasal Turnover Hinge Flap Text: The defect edges were debeveled with a #15 scalpel blade.  Given the size, depth, location of the defect and the defect being full thickness a nasal turnover hinge flap was deemed most appropriate.  Using a sterile surgical marker, an appropriate hinge flap was drawn incorporating the defect. The area thus outlined was incised with a #15 scalpel blade. The flap was designed to recreate the nasal mucosal lining and the alar rim. The skin margins were undermined to an appropriate distance in all directions utilizing iris scissors.
Purse String (Simple) Text: Given the location of the defect and the characteristics of the surrounding skin a purse string closure was deemed most appropriate.  Undermining was performed circumfirentially around the surgical defect.  A purse string suture was then placed and tightened.
Repair Hemostasis (Optional): Pinpoint electrocautery
Rotation Flap Text: The defect edges were debeveled with a #15 scalpel blade.  Given the location of the defect, shape of the defect and the proximity to free margins a rotation flap was deemed most appropriate.  Using a sterile surgical marker, an appropriate rotation flap was drawn incorporating the defect and placing the expected incisions within the relaxed skin tension lines where possible.    The area thus outlined was incised deep to adipose tissue with a #15 scalpel blade.  The skin margins were undermined to an appropriate distance in all directions utilizing iris scissors.
Mid-Level Procedure Text (A): After obtaining clear surgical margins the patient was sent to a mid-level provider for surgical repair.  The patient understands they will receive post-surgical care and follow-up from the mid-level provider.
Peng Advancement Flap Text: The defect edges were debeveled with a #15 scalpel blade.  Given the location of the defect, shape of the defect and the proximity to free margins a Peng advancement flap was deemed most appropriate.  Using a sterile surgical marker, an appropriate advancement flap was drawn incorporating the defect and placing the expected incisions within the relaxed skin tension lines where possible. The area thus outlined was incised deep to adipose tissue with a #15 scalpel blade.  The skin margins were undermined to an appropriate distance in all directions utilizing iris scissors.
Spiral Flap Text: The defect edges were debeveled with a #15 scalpel blade.  Given the location of the defect, shape of the defect and the proximity to free margins a spiral flap was deemed most appropriate.  Using a sterile surgical marker, an appropriate rotation flap was drawn incorporating the defect and placing the expected incisions within the relaxed skin tension lines where possible. The area thus outlined was incised deep to adipose tissue with a #15 scalpel blade.  The skin margins were undermined to an appropriate distance in all directions utilizing iris scissors.
Helical Rim Text: The closure involved the helical rim.
Area L Indication Text: Tumors in this location are included in Area L (trunk and extremities).  Mohs surgery is indicated for larger tumors, or tumors with aggressive histologic features, in these anatomic locations.
Closure 2 Information: This tab is for additional flaps and grafts, including complex repair and grafts and complex repair and flaps. You can also specify a different location for the additional defect, if the location is the same you do not need to select a new one. We will insert the automated text for the repair you select below just as we do for solitary flaps and grafts. Please note that at this time if you select a location with a different insurance zone you will need to override the ICD10 and CPT if appropriate.
O-T Plasty Text: The defect edges were debeveled with a #15 scalpel blade.  Given the location of the defect, shape of the defect and the proximity to free margins an O-T plasty was deemed most appropriate.  Using a sterile surgical marker, an appropriate O-T plasty was drawn incorporating the defect and placing the expected incisions within the relaxed skin tension lines where possible.    The area thus outlined was incised deep to adipose tissue with a #15 scalpel blade.  The skin margins were undermined to an appropriate distance in all directions utilizing iris scissors.
Previous Accession (Optional): [id number]
Repair Type: Complex Repair
Advancement Flap (Single) Text: The defect edges were debeveled with a #15 scalpel blade.  Given the location of the defect and the proximity to free margins a single advancement flap was deemed most appropriate.  Using a sterile surgical marker, an appropriate advancement flap was drawn incorporating the defect and placing the expected incisions within the relaxed skin tension lines where possible.    The area thus outlined was incised deep to adipose tissue with a #15 scalpel blade.  The skin margins were undermined to an appropriate distance in all directions utilizing iris scissors.
Graft Cartilage Fenestration Text: The cartilage was fenestrated with a 2mm punch biopsy to help facilitate graft survival and healing.
Unna Boot Text: An Unna boot was placed to help immobilize the limb and facilitate more rapid healing.
Alternatives Discussed Intro (Do Not Add Period): I discussed alternative treatments to Mohs surgery and specifically discussed the risks and benefits of
Non-Graft Cartilage Fenestration Text: The cartilage was fenestrated with a 2mm punch biopsy to help facilitate healing.
No Residual Tumor Seen Histology Text: There were no malignant cells seen in the sections examined.
Secondary Intention Text (Leave Blank If You Do Not Want): The defect will heal with secondary intention.
Advancement-Rotation Flap Text: The defect edges were debeveled with a #15 scalpel blade.  Given the location of the defect, shape of the defect and the proximity to free margins an advancement-rotation flap was deemed most appropriate.  Using a sterile surgical marker, an appropriate flap was drawn incorporating the defect and placing the expected incisions within the relaxed skin tension lines where possible. The area thus outlined was incised deep to adipose tissue with a #15 scalpel blade.  The skin margins were undermined to an appropriate distance in all directions utilizing iris scissors.
Postop Diagnosis: same
Partial Purse String (Intermediate) Text: Given the location of the defect and the characteristics of the surrounding skin an intermediate purse string closure was deemed most appropriate.  Undermining was performed circumfirentially around the surgical defect.  A purse string suture was then placed and tightened. Wound tension only allowed a partial closure of the circular defect.
Burow's Advancement Flap Text: The defect edges were debeveled with a #15 scalpel blade.  Given the location of the defect and the proximity to free margins a Burow's advancement flap was deemed most appropriate.  Using a sterile surgical marker, the appropriate advancement flap was drawn incorporating the defect and placing the expected incisions within the relaxed skin tension lines where possible.    The area thus outlined was incised deep to adipose tissue with a #15 scalpel blade.  The skin margins were undermined to an appropriate distance in all directions utilizing iris scissors.
Mart-1 - Positive Histology Text: MART-1 staining demonstrates areas of higher density and clustering of melanocytes with Pagetoid spread upwards within the epidermis. The surgical margins are positive for tumor cells.
Bcc Infiltrative Histology Text: There were numerous aggregates of basaloid cells demonstrating an infiltrative pattern.
Tumor Depth: Less than 6mm from granular layer and no invasion beyond the subcutaneous fat
Simple / Intermediate / Complex Repair - Final Wound Length In Cm: 4.4
Provider Procedure Text (A): After obtaining clear surgical margins the defect was repaired by another provider.
Helical Rim Advancement Flap Text: The defect edges were debeveled with a #15 blade scalpel.  Given the location of the defect and the proximity to free margins (helical rim) a double helical rim advancement flap was deemed most appropriate.  Using a sterile surgical marker, the appropriate advancement flaps were drawn incorporating the defect and placing the expected incisions between the helical rim and antihelix where possible.  The area thus outlined was incised through and through with a #15 scalpel blade.  With a skin hook and iris scissors, the flaps were gently and sharply undermined and freed up.
Debridement Text: The wound edges were debrided prior to proceeding with the closure to facilitate wound healing.
O-T Advancement Flap Text: The defect edges were debeveled with a #15 scalpel blade.  Given the location of the defect, shape of the defect and the proximity to free margins an O-T advancement flap was deemed most appropriate.  Using a sterile surgical marker, an appropriate advancement flap was drawn incorporating the defect and placing the expected incisions within the relaxed skin tension lines where possible.    The area thus outlined was incised deep to adipose tissue with a #15 scalpel blade.  The skin margins were undermined to an appropriate distance in all directions utilizing iris scissors.
Alar Island Pedicle Flap Text: The defect edges were debeveled with a #15 scalpel blade.  Given the location of the defect, shape of the defect and the proximity to the alar rim an island pedicle advancement flap was deemed most appropriate.  Using a sterile surgical marker, an appropriate advancement flap was drawn incorporating the defect, outlining the appropriate donor tissue and placing the expected incisions within the nasal ala running parallel to the alar rim. The area thus outlined was incised with a #15 scalpel blade.  The skin margins were undermined minimally to an appropriate distance in all directions around the primary defect and laterally outward around the island pedicle utilizing iris scissors.  There was minimal undermining beneath the pedicle flap.
Brow Lift Text: A midfrontal incision was made medially to the defect to allow access to the tissues just superior to the left eyebrow. Following careful dissection inferiorly in a supraperiosteal plane to the level of the left eyebrow, several 3-0 monocryl sutures were used to resuspend the eyebrow orbicularis oculi muscular unit to the superior frontal bone periosteum. This resulted in an appropriate reapproximation of static eyebrow symmetry and correction of the left brow ptosis.
Banner Transposition Flap Text: The defect edges were debeveled with a #15 scalpel blade.  Given the location of the defect and the proximity to free margins a Banner transposition flap was deemed most appropriate.  Using a sterile surgical marker, an appropriate flap drawn around the defect. The area thus outlined was incised deep to adipose tissue with a #15 scalpel blade.  The skin margins were undermined to an appropriate distance in all directions utilizing iris scissors.
Melolabial Interpolation Flap Text: A decision was made to reconstruct the defect utilizing an interpolation axial flap and a staged reconstruction.  A telfa template was made of the defect.  This telfa template was then used to outline the melolabial interpolation flap.  The donor area for the pedicle flap was then injected with anesthesia.  The flap was excised through the skin and subcutaneous tissue down to the layer of the underlying musculature.  The pedicle flap was carefully excised within this deep plane to maintain its blood supply.  The edges of the donor site were undermined.   The donor site was closed in a primary fashion.  The pedicle was then rotated into position and sutured.  Once the tube was sutured into place, adequate blood supply was confirmed with blanching and refill.  The pedicle was then wrapped with xeroform gauze and dressed appropriately with a telfa and gauze bandage to ensure continued blood supply and protect the attached pedicle.
Mucosal Advancement Flap Text: Given the location of the defect, shape of the defect and the proximity to free margins a mucosal advancement flap was deemed most appropriate. Incisions were made with a 15 blade scalpel in the appropriate fashion along the cutaneous vermilion border and the mucosal lip. The remaining actinically damaged mucosal tissue was excised.  The mucosal advancement flap was then elevated to the gingival sulcus with care taken to preserve the neurovascular structures and advanced into the primary defect. Care was taken to ensure that precise realignment of the vermilion border was achieved.
Star Wedge Flap Text: The defect edges were debeveled with a #15 scalpel blade.  Given the location of the defect, shape of the defect and the proximity to free margins a star wedge flap was deemed most appropriate.  Using a sterile surgical marker, an appropriate rotation flap was drawn incorporating the defect and placing the expected incisions within the relaxed skin tension lines where possible. The area thus outlined was incised deep to adipose tissue with a #15 scalpel blade.  The skin margins were undermined to an appropriate distance in all directions utilizing iris scissors.
X Size Of Lesion In Cm (Optional): 0.3
Depth Of Tumor Invasion (For Histology): dermis
Hemigard Postcare Instructions: The HEMIGARD strips are to remain completely dry for at least 5-7 days.
Consent (Marginal Mandibular)/Introductory Paragraph: The rationale for Mohs was explained to the patient and consent was obtained. The risks, benefits and alternatives to therapy were discussed in detail. Specifically, the risks of damage to the marginal mandibular branch of the facial nerve, infection, scarring, bleeding, prolonged wound healing, incomplete removal, allergy to anesthesia, and recurrence were addressed. Prior to the procedure, the treatment site was clearly identified and confirmed by the patient. All components of Universal Protocol/PAUSE Rule completed.
Consent Type: Consent 1 (Standard)
Mohs Method Verbiage: An incision at a 45 degree angle following the standard Mohs approach was done and the specimen was harvested as a microscopic controlled layer.
Surgeon/Pathologist Verbiage (Will Incorporate Name Of Surgeon From Intro If Not Blank): operated in two distinct and integrated capacities as the surgeon and pathologist.
Subsequent Stages Histo Method Verbiage: Using a similar technique to that described above, a thin layer of tissue was removed from all areas where tumor was visible on the previous stage.  The tissue was again oriented, mapped, dyed, and processed as above.
Z Plasty Text: The lesion was extirpated to the level of the fat with a #15 scalpel blade.  Given the location of the defect, shape of the defect and the proximity to free margins a Z-plasty was deemed most appropriate for repair.  Using a sterile surgical marker, the appropriate transposition arms of the Z-plasty were drawn incorporating the defect and placing the expected incisions within the relaxed skin tension lines where possible.    The area thus outlined was incised deep to adipose tissue with a #15 scalpel blade.  The skin margins were undermined to an appropriate distance in all directions utilizing iris scissors.  The opposing transposition arms were then transposed into place in opposite direction and anchored with interrupted buried subcutaneous sutures.
Double Island Pedicle Flap Text: The defect edges were debeveled with a #15 scalpel blade.  Given the location of the defect, shape of the defect and the proximity to free margins a double island pedicle advancement flap was deemed most appropriate.  Using a sterile surgical marker, an appropriate advancement flap was drawn incorporating the defect, outlining the appropriate donor tissue and placing the expected incisions within the relaxed skin tension lines where possible.    The area thus outlined was incised deep to adipose tissue with a #15 scalpel blade.  The skin margins were undermined to an appropriate distance in all directions around the primary defect and laterally outward around the island pedicle utilizing iris scissors.  There was minimal undermining beneath the pedicle flap.
Date Of Previous Biopsy (Optional): 3/14/22
Composite Graft Text: The defect edges were debeveled with a #15 scalpel blade.  Given the location of the defect, shape of the defect, the proximity to free margins and the fact the defect was full thickness a composite graft was deemed most appropriate.  The defect was outline and then transferred to the donor site.  A full thickness graft was then excised from the donor site. The graft was then placed in the primary defect, oriented appropriately and then sutured into place.  The secondary defect was then repaired using a primary closure.
Graft Donor Site Epidermal Sutures (Optional): 5-0 Surgipro
Area M Indication Text: Tumors in this location are included in Area M (cheek, forehead, scalp, neck, jawline and pretibial skin).  Mohs surgery is indicated for tumors in these anatomic locations.
Cheek Interpolation Flap Text: A decision was made to reconstruct the defect utilizing an interpolation axial flap and a staged reconstruction.  A telfa template was made of the defect.  This telfa template was then used to outline the Cheek Interpolation flap.  The donor area for the pedicle flap was then injected with anesthesia.  The flap was excised through the skin and subcutaneous tissue down to the layer of the underlying musculature.  The interpolation flap was carefully excised within this deep plane to maintain its blood supply.  The edges of the donor site were undermined.   The donor site was closed in a primary fashion.  The pedicle was then rotated into position and sutured.  Once the tube was sutured into place, adequate blood supply was confirmed with blanching and refill.  The pedicle was then wrapped with xeroform gauze and dressed appropriately with a telfa and gauze bandage to ensure continued blood supply and protect the attached pedicle.
Detail Level: Detailed
Partial Purse String (Simple) Text: Given the location of the defect and the characteristics of the surrounding skin a simple purse string closure was deemed most appropriate.  Undermining was performed circumfirentially around the surgical defect.  A purse string suture was then placed and tightened. Wound tension only allowed a partial closure of the circular defect.
Manual Repair Warning Statement: We plan on removing the manually selected variable below in favor of our much easier automatic structured text blocks found in the previous tab. We decided to do this to help make the flow better and give you the full power of structured data. Manual selection is never going to be ideal in our platform and I would encourage you to avoid using manual selection from this point on, especially since I will be sunsetting this feature. It is important that you do one of two things with the customized text below. First, you can save all of the text in a word file so you can have it for future reference. Second, transfer the text to the appropriate area in the Library tab. Lastly, if there is a flap or graft type which we do not have you need to let us know right away so I can add it in before the variable is hidden. No need to panic, we plan to give you roughly 6 months to make the change.
Deep Sutures: 5-0 Polysorb
Orbicularis Oris Muscle Flap Text: The defect edges were debeveled with a #15 scalpel blade.  Given that the defect affected the competency of the oral sphincter an orbicularis oris muscle flap was deemed most appropriate to restore this competency and normal muscle function.  Using a sterile surgical marker, an appropriate flap was drawn incorporating the defect. The area thus outlined was incised with a #15 scalpel blade.
Paramedian Forehead Flap Text: A decision was made to reconstruct the defect utilizing an interpolation axial flap and a staged reconstruction.  A telfa template was made of the defect.  This telfa template was then used to outline the paramedian forehead pedicle flap.  The donor area for the pedicle flap was then injected with anesthesia.  The flap was excised through the skin and subcutaneous tissue down to the layer of the underlying musculature.  The pedicle flap was carefully excised within this deep plane to maintain its blood supply.  The edges of the donor site were undermined.   The donor site was closed in a primary fashion.  The pedicle was then rotated into position and sutured.  Once the tube was sutured into place, adequate blood supply was confirmed with blanching and refill.  The pedicle was then wrapped with xeroform gauze and dressed appropriately with a telfa and gauze bandage to ensure continued blood supply and protect the attached pedicle.
Donor Site Anesthesia Type: same as repair anesthesia
Complex Repair And Graft Additional Text (Will Appearing After The Standard Complex Repair Text): The complex repair was not sufficient to completely close the primary defect. The remaining additional defect was repaired with the graft mentioned below.
Consent (Scalp)/Introductory Paragraph: The rationale for Mohs was explained to the patient and consent was obtained. The risks, benefits and alternatives to therapy were discussed in detail. Specifically, the risks of changes in hair growth pattern secondary to repair, infection, scarring, bleeding, prolonged wound healing, incomplete removal, allergy to anesthesia, nerve injury and recurrence were addressed. Prior to the procedure, the treatment site was clearly identified and confirmed by the patient. All components of Universal Protocol/PAUSE Rule completed.
Bilobed Transposition Flap Text: The defect edges were debeveled with a #15 scalpel blade.  Given the location of the defect and the proximity to free margins a bilobed transposition flap was deemed most appropriate.  Using a sterile surgical marker, an appropriate bilobe flap drawn around the defect.    The area thus outlined was incised deep to adipose tissue with a #15 scalpel blade.  The skin margins were undermined to an appropriate distance in all directions utilizing iris scissors.
Consent (Ear)/Introductory Paragraph: The rationale for Mohs was explained to the patient and consent was obtained. The risks, benefits and alternatives to therapy were discussed in detail. Specifically, the risks of ear deformity, infection, scarring, bleeding, prolonged wound healing, incomplete removal, allergy to anesthesia, nerve injury and recurrence were addressed. Prior to the procedure, the treatment site was clearly identified and confirmed by the patient. All components of Universal Protocol/PAUSE Rule completed.
Melolabial Transposition Flap Text: The defect edges were debeveled with a #15 scalpel blade.  Given the location of the defect and the proximity to free margins a melolabial flap was deemed most appropriate.  Using a sterile surgical marker, an appropriate melolabial transposition flap was drawn incorporating the defect.    The area thus outlined was incised deep to adipose tissue with a #15 scalpel blade.  The skin margins were undermined to an appropriate distance in all directions utilizing iris scissors.
Hemigard Intro: Due to skin fragility and wound tension, it was decided to use HEMIGARD adhesive retention suture devices to permit a linear closure. The skin was cleaned and dried for a 6cm distance away from the wound. Excessive hair, if present, was removed to allow for adhesion.
Cheek-To-Nose Interpolation Flap Text: A decision was made to reconstruct the defect utilizing an interpolation axial flap and a staged reconstruction.  A telfa template was made of the defect.  This telfa template was then used to outline the Cheek-To-Nose Interpolation flap.  The donor area for the pedicle flap was then injected with anesthesia.  The flap was excised through the skin and subcutaneous tissue down to the layer of the underlying musculature.  The interpolation flap was carefully excised within this deep plane to maintain its blood supply.  The edges of the donor site were undermined.   The donor site was closed in a primary fashion.  The pedicle was then rotated into position and sutured.  Once the tube was sutured into place, adequate blood supply was confirmed with blanching and refill.  The pedicle was then wrapped with xeroform gauze and dressed appropriately with a telfa and gauze bandage to ensure continued blood supply and protect the attached pedicle.
Inflammation Suggestive Of Cancer Camouflage Histology Text: There was a dense lymphocytic infiltrate which prevented adequate histologic evaluation of adjacent structures.
O-Z Plasty Text: The defect edges were debeveled with a #15 scalpel blade.  Given the location of the defect, shape of the defect and the proximity to free margins an O-Z plasty (double transposition flap) was deemed most appropriate.  Using a sterile surgical marker, the appropriate transposition flaps were drawn incorporating the defect and placing the expected incisions within the relaxed skin tension lines where possible.    The area thus outlined was incised deep to adipose tissue with a #15 scalpel blade.  The skin margins were undermined to an appropriate distance in all directions utilizing iris scissors.  Hemostasis was achieved with electrocautery.  The flaps were then transposed into place, one clockwise and the other counterclockwise, and anchored with interrupted buried subcutaneous sutures.
Keystone Flap Text: The defect edges were debeveled with a #15 scalpel blade.  Given the location of the defect, shape of the defect a keystone flap was deemed most appropriate.  Using a sterile surgical marker, an appropriate keystone flap was drawn incorporating the defect, outlining the appropriate donor tissue and placing the expected incisions within the relaxed skin tension lines where possible. The area thus outlined was incised deep to adipose tissue with a #15 scalpel blade.  The skin margins were undermined to an appropriate distance in all directions around the primary defect and laterally outward around the flap utilizing iris scissors.
Island Pedicle Flap Text: The defect edges were debeveled with a #15 scalpel blade.  Given the location of the defect, shape of the defect and the proximity to free margins an island pedicle advancement flap was deemed most appropriate.  Using a sterile surgical marker, an appropriate advancement flap was drawn incorporating the defect, outlining the appropriate donor tissue and placing the expected incisions within the relaxed skin tension lines where possible.    The area thus outlined was incised deep to adipose tissue with a #15 scalpel blade.  The skin margins were undermined to an appropriate distance in all directions around the primary defect and laterally outward around the island pedicle utilizing iris scissors.  There was minimal undermining beneath the pedicle flap.
Suture Removal: 10 days
Dermal Autograft Text: The defect edges were debeveled with a #15 scalpel blade.  Given the location of the defect, shape of the defect and the proximity to free margins a dermal autograft was deemed most appropriate.  Using a sterile surgical marker, the primary defect shape was transferred to the donor site. The area thus outlined was incised deep to adipose tissue with a #15 scalpel blade.  The harvested graft was then trimmed of adipose and epidermal tissue until only dermis was left.  The skin graft was then placed in the primary defect and oriented appropriately.
Full Thickness Lip Wedge Repair (Flap) Text: Given the location of the defect and the proximity to free margins a full thickness wedge repair was deemed most appropriate.  Using a sterile surgical marker, the appropriate repair was drawn incorporating the defect and placing the expected incisions perpendicular to the vermilion border.  The vermilion border was also meticulously outlined to ensure appropriate reapproximation during the repair.  The area thus outlined was incised through and through with a #15 scalpel blade.  The muscularis and dermis were reaproximated with deep sutures following hemostasis. Care was taken to realign the vermilion border before proceeding with the superficial closure.  Once the vermilion was realigned the superfical and mucosal closure was finished.
Posterior Auricular Interpolation Flap Text: A decision was made to reconstruct the defect utilizing an interpolation axial flap and a staged reconstruction.  A telfa template was made of the defect.  This telfa template was then used to outline the posterior auricular interpolation flap.  The donor area for the pedicle flap was then injected with anesthesia.  The flap was excised through the skin and subcutaneous tissue down to the layer of the underlying musculature.  The pedicle flap was carefully excised within this deep plane to maintain its blood supply.  The edges of the donor site were undermined.   The donor site was closed in a primary fashion.  The pedicle was then rotated into position and sutured.  Once the tube was sutured into place, adequate blood supply was confirmed with blanching and refill.  The pedicle was then wrapped with xeroform gauze and dressed appropriately with a telfa and gauze bandage to ensure continued blood supply and protect the attached pedicle.
Suturegard Intro: Intraoperative tissue expansion was performed, utilizing the SUTUREGARD device, in order to reduce wound tension.
Undermining Type: Entire Wound
Undermining Location (Optional): in the superficial subcutaneous fat
Mercedes Flap Text: The defect edges were debeveled with a #15 scalpel blade.  Given the location of the defect, shape of the defect and the proximity to free margins a Mercedes flap was deemed most appropriate.  Using a sterile surgical marker, an appropriate advancement flap was drawn incorporating the defect and placing the expected incisions within the relaxed skin tension lines where possible. The area thus outlined was incised deep to adipose tissue with a #15 scalpel blade.  The skin margins were undermined to an appropriate distance in all directions utilizing iris scissors.
Trilobed Flap Text: The defect edges were debeveled with a #15 scalpel blade.  Given the location of the defect and the proximity to free margins a trilobed flap was deemed most appropriate.  Using a sterile surgical marker, an appropriate trilobed flap drawn around the defect.    The area thus outlined was incised deep to adipose tissue with a #15 scalpel blade.  The skin margins were undermined to an appropriate distance in all directions utilizing iris scissors.
Localized Dermabrasion With Wire Brush Text: The patient was draped in routine manner.  Localized dermabrasion using 3 x 17 mm wire brush was performed in routine manner to papillary dermis. This spot dermabrasion is being performed to complete skin cancer reconstruction. It also will eliminate the other sun damaged precancerous cells that are known to be part of the regional effect of a lifetime's worth of sun exposure. This localized dermabrasion is therapeutic and should not be considered cosmetic in any regard.
Wound Care: Vaseline
Number Of Stages: 2
Skin Substitute Text: The defect edges were debeveled with a #15 scalpel blade.  Given the location of the defect, shape of the defect and the proximity to free margins a skin substitute graft was deemed most appropriate.  The graft material was trimmed to fit the size of the defect. The graft was then placed in the primary defect and oriented appropriately.
Ear Star Wedge Flap Text: The defect edges were debeveled with a #15 blade scalpel.  Given the location of the defect and the proximity to free margins (helical rim) an ear star wedge flap was deemed most appropriate.  Using a sterile surgical marker, the appropriate flap was drawn incorporating the defect and placing the expected incisions between the helical rim and antihelix where possible.  The area thus outlined was incised through and through with a #15 scalpel blade.
Bilobed Flap Text: The defect edges were debeveled with a #15 scalpel blade.  Given the location of the defect and the proximity to free margins a bilobe flap was deemed most appropriate.  Using a sterile surgical marker, an appropriate bilobe flap drawn around the defect.    The area thus outlined was incised deep to adipose tissue with a #15 scalpel blade.  The skin margins were undermined to an appropriate distance in all directions utilizing iris scissors.
Consent 1/Introductory Paragraph: The rationale for Mohs was explained to the patient and consent was obtained. The risks, benefits and alternatives to therapy were discussed in detail. Specifically, the risks of infection, scarring, bleeding, prolonged wound healing, incomplete removal, allergy to anesthesia, nerve injury and recurrence were addressed. Prior to the procedure, the treatment site was clearly identified and confirmed by the patient. All components of Universal Protocol/PAUSE Rule completed.
Epidermal Closure: running and interrupted
Primary Defect Width In Cm (Final Defect Size - Required For Flaps/Grafts): 1.1
Mauc Instructions: By selecting yes to the question below the MAUC number will be added into the note.  This will be calculated automatically based on the diagnosis chosen, the size entered, the body zone selected (H,M,L) and the specific indications you chose. You will also have the option to override the Mohs AUC if you disagree with the automatically calculated number and this option is found in the Case Summary tab.
Advancement Flap (Double) Text: The defect edges were debeveled with a #15 scalpel blade.  Given the location of the defect and the proximity to free margins a double advancement flap was deemed most appropriate.  Using a sterile surgical marker, the appropriate advancement flaps were drawn incorporating the defect and placing the expected incisions within the relaxed skin tension lines where possible.    The area thus outlined was incised deep to adipose tissue with a #15 scalpel blade.  The skin margins were undermined to an appropriate distance in all directions utilizing iris scissors.
Island Pedicle Flap With Canthal Suspension Text: The defect edges were debeveled with a #15 scalpel blade.  Given the location of the defect, shape of the defect and the proximity to free margins an island pedicle advancement flap was deemed most appropriate.  Using a sterile surgical marker, an appropriate advancement flap was drawn incorporating the defect, outlining the appropriate donor tissue and placing the expected incisions within the relaxed skin tension lines where possible. The area thus outlined was incised deep to adipose tissue with a #15 scalpel blade.  The skin margins were undermined to an appropriate distance in all directions around the primary defect and laterally outward around the island pedicle utilizing iris scissors.  There was minimal undermining beneath the pedicle flap. A suspension suture was placed in the canthal tendon to prevent tension and prevent ectropion.
Bcc Histology Text: There were numerous aggregates of basaloid cells.
Graft Donor Site Bandage (Optional-Leave Blank If You Don't Want In Note): Aquaplast was fitted to the graft site and sewn into place. A pressure bandage were applied to the donor site and over the aquaplast bolster.
Dorsal Nasal Flap Text: The defect edges were debeveled with a #15 scalpel blade.  Given the location of the defect and the proximity to free margins a dorsal nasal flap was deemed most appropriate.  Using a sterile surgical marker, an appropriate dorsal nasal flap was drawn around the defect.    The area thus outlined was incised deep to adipose tissue with a #15 scalpel blade.  The skin margins were undermined to an appropriate distance in all directions utilizing iris scissors.
Hatchet Flap Text: The defect edges were debeveled with a #15 scalpel blade.  Given the location of the defect, shape of the defect and the proximity to free margins a hatchet flap was deemed most appropriate.  Using a sterile surgical marker, an appropriate hatchet flap was drawn incorporating the defect and placing the expected incisions within the relaxed skin tension lines where possible.    The area thus outlined was incised deep to adipose tissue with a #15 scalpel blade.  The skin margins were undermined to an appropriate distance in all directions utilizing iris scissors.
Cartilage Graft Text: The defect edges were debeveled with a #15 scalpel blade.  Given the location of the defect, shape of the defect, the fact the defect involved a full thickness cartilage defect a cartilage graft was deemed most appropriate.  An appropriate donor site was identified, cleansed, and anesthetized. The cartilage graft was then harvested and transferred to the recipient site, oriented appropriately and then sutured into place.  The secondary defect was then repaired using a primary closure.
Modified Advancement Flap Text: The defect edges were debeveled with a #15 scalpel blade.  Given the location of the defect, shape of the defect and the proximity to free margins a modified advancement flap was deemed most appropriate.  Using a sterile surgical marker, an appropriate advancement flap was drawn incorporating the defect and placing the expected incisions within the relaxed skin tension lines where possible.    The area thus outlined was incised deep to adipose tissue with a #15 scalpel blade.  The skin margins were undermined to an appropriate distance in all directions utilizing iris scissors.
Staging Info: By selecting yes to the question above you will include information on AJCC 8 tumor staging in your Mohs note. Information on tumor staging will be automatically added for SCCs on the head and neck. AJCC 8 includes tumor size, tumor depth, perineural involvement and bone invasion.
Tissue Cultured Epidermal Autograft Text: The defect edges were debeveled with a #15 scalpel blade.  Given the location of the defect, shape of the defect and the proximity to free margins a tissue cultured epidermal autograft was deemed most appropriate.  The graft was then trimmed to fit the size of the defect.  The graft was then placed in the primary defect and oriented appropriately.
Retention Suture Text: Retention sutures were placed to support the closure and prevent dehiscence.
Staged Advancement Flap Text: The defect edges were debeveled with a #15 scalpel blade.  Given the location of the defect, shape of the defect and the proximity to free margins a staged advancement flap was deemed most appropriate.  Using a sterile surgical marker, an appropriate advancement flap was drawn incorporating the defect and placing the expected incisions within the relaxed skin tension lines where possible. The area thus outlined was incised deep to adipose tissue with a #15 scalpel blade.  The skin margins were undermined to an appropriate distance in all directions utilizing iris scissors.
Mohs Histo Method Verbiage: Each section was then chromacoded and processed in the Mohs lab using the Mohs protocol and submitted for frozen section.
Medical Necessity Statement: Based on my medical judgement, Mohs surgery is the most appropriate treatment for this cancer compared to other treatments.
Consent (Near Eyelid Margin)/Introductory Paragraph: The rationale for Mohs was explained to the patient and consent was obtained. The risks, benefits and alternatives to therapy were discussed in detail. Specifically, the risks of ectropion or eyelid deformity, infection, scarring, bleeding, prolonged wound healing, incomplete removal, allergy to anesthesia, nerve injury and recurrence were addressed. Prior to the procedure, the treatment site was clearly identified and confirmed by the patient. All components of Universal Protocol/PAUSE Rule completed.
Consent (Spinal Accessory)/Introductory Paragraph: The rationale for Mohs was explained to the patient and consent was obtained. The risks, benefits and alternatives to therapy were discussed in detail. Specifically, the risks of damage to the spinal accessory nerve, infection, scarring, bleeding, prolonged wound healing, incomplete removal, allergy to anesthesia, and recurrence were addressed. Prior to the procedure, the treatment site was clearly identified and confirmed by the patient. All components of Universal Protocol/PAUSE Rule completed.
Wound Care (No Sutures): Petrolatum
Consent (Lip)/Introductory Paragraph: The rationale for Mohs was explained to the patient and consent was obtained. The risks, benefits and alternatives to therapy were discussed in detail. Specifically, the risks of lip deformity, changes in the oral aperture, infection, scarring, bleeding, prolonged wound healing, incomplete removal, allergy to anesthesia, nerve injury and recurrence were addressed. Prior to the procedure, the treatment site was clearly identified and confirmed by the patient. All components of Universal Protocol/PAUSE Rule completed.
W Plasty Text: The lesion was extirpated to the level of the fat with a #15 scalpel blade.  Given the location of the defect, shape of the defect and the proximity to free margins a W-plasty was deemed most appropriate for repair.  Using a sterile surgical marker, the appropriate transposition arms of the W-plasty were drawn incorporating the defect and placing the expected incisions within the relaxed skin tension lines where possible.    The area thus outlined was incised deep to adipose tissue with a #15 scalpel blade.  The skin margins were undermined to an appropriate distance in all directions utilizing iris scissors.  The opposing transposition arms were then transposed into place in opposite direction and anchored with interrupted buried subcutaneous sutures.
Mart-1 - Negative Histology Text: MART-1 staining demonstrates a normal density and pattern of melanocytes along the dermal-epidermal junction. The surgical margins are negative for tumor cells.
Estimated Blood Loss (Cc): minimal
Initial Size Of Lesion: 0.4
Home Suture Removal Text: Patient was provided instructions on removing sutures and will remove their sutures at home.  If they have any questions or difficulties they will call the office.
Same Histology In Subsequent Stages Text: The pattern and morphology of the tumor is as described in the first stage.
V-Y Plasty Text: The defect edges were debeveled with a #15 scalpel blade.  Given the location of the defect, shape of the defect and the proximity to free margins an V-Y advancement flap was deemed most appropriate.  Using a sterile surgical marker, an appropriate advancement flap was drawn incorporating the defect and placing the expected incisions within the relaxed skin tension lines where possible.    The area thus outlined was incised deep to adipose tissue with a #15 scalpel blade.  The skin margins were undermined to an appropriate distance in all directions utilizing iris scissors.
Complex Repair And Flap Additional Text (Will Appearing After The Standard Complex Repair Text): The complex repair was not sufficient to completely close the primary defect. The remaining additional defect was repaired with the flap mentioned below.
Adjacent Tissue Transfer Text: The defect edges were debeveled with a #15 scalpel blade.  Given the location of the defect and the proximity to free margins an adjacent tissue transfer was deemed most appropriate.  Using a sterile surgical marker, an appropriate flap was drawn incorporating the defect and placing the expected incisions within the relaxed skin tension lines where possible.    The area thus outlined was incised deep to adipose tissue with a #15 scalpel blade.  The skin margins were undermined to an appropriate distance in all directions utilizing iris scissors.

## 2022-04-19 ENCOUNTER — APPOINTMENT (RX ONLY)
Dept: URBAN - METROPOLITAN AREA CLINIC 22 | Facility: CLINIC | Age: 67
Setting detail: DERMATOLOGY
End: 2022-04-19

## 2022-04-19 DIAGNOSIS — Z48.02 ENCOUNTER FOR REMOVAL OF SUTURES: ICD-10-CM

## 2022-04-19 PROCEDURE — ? SUTURE REMOVAL (GLOBAL PERIOD)

## 2022-04-19 ASSESSMENT — LOCATION ZONE DERM: LOCATION ZONE: EAR

## 2022-04-19 ASSESSMENT — LOCATION DETAILED DESCRIPTION DERM: LOCATION DETAILED: LEFT SUPERIOR CRUS OF ANTIHELIX

## 2022-04-19 ASSESSMENT — LOCATION SIMPLE DESCRIPTION DERM: LOCATION SIMPLE: LEFT EAR

## 2022-04-19 NOTE — PROCEDURE: SUTURE REMOVAL (GLOBAL PERIOD)
Detail Level: Simple
Add 24727 Cpt? (Important Note: In 2017 The Use Of 31083 Is Being Tracked By Cms To Determine Future Global Period Reimbursement For Global Periods): no

## 2022-04-19 NOTE — PROCEDURE: MIPS QUALITY
Quality 130: Documentation Of Current Medications In The Medical Record: Current Medications Documented
Quality 431: Preventive Care And Screening: Unhealthy Alcohol Use - Screening: Patient not identified as an unhealthy alcohol user when screened for unhealthy alcohol use using a systematic screening method
Quality 111:Pneumonia Vaccination Status For Older Adults: Pneumococcal Vaccination Previously Received
Detail Level: Detailed
Quality 226: Preventive Care And Screening: Tobacco Use: Screening And Cessation Intervention: Patient screened for tobacco use and is an ex/non-smoker

## 2022-04-20 DIAGNOSIS — E53.8 VITAMIN B 12 DEFICIENCY: ICD-10-CM

## 2022-04-20 DIAGNOSIS — E11.9 TYPE 2 DIABETES MELLITUS WITH HEMOGLOBIN A1C GOAL OF LESS THAN 7.0% (HCC): ICD-10-CM

## 2022-04-20 RX ORDER — METFORMIN HYDROCHLORIDE 500 MG/1
TABLET, EXTENDED RELEASE ORAL
Qty: 400 TABLET | Refills: 11 | Status: SHIPPED | OUTPATIENT
Start: 2022-04-20 | End: 2022-10-18 | Stop reason: SDUPTHER

## 2022-07-13 DIAGNOSIS — E11.9 TYPE 2 DIABETES MELLITUS WITH HEMOGLOBIN A1C GOAL OF LESS THAN 7.0% (HCC): ICD-10-CM

## 2022-07-13 RX ORDER — LEVOTHYROXINE SODIUM 0.2 MG/1
200 TABLET ORAL
Qty: 90 TABLET | Refills: 2 | Status: SHIPPED
Start: 2022-07-13 | End: 2022-10-18

## 2022-07-16 NOTE — PATIENT INSTRUCTIONS
Follow up in 3 months. DO the lab work prior to next visit. About 1 week prior   Please do fasting lipid profile this week.    Start taking vitamin B12 daily.    Awake/Alert/Cooperative

## 2022-07-27 ENCOUNTER — TELEPHONE (OUTPATIENT)
Dept: MEDICAL GROUP | Facility: MEDICAL CENTER | Age: 67
End: 2022-07-27
Payer: MEDICARE

## 2022-07-27 SDOH — ECONOMIC STABILITY: INCOME INSECURITY: IN THE LAST 12 MONTHS, WAS THERE A TIME WHEN YOU WERE NOT ABLE TO PAY THE MORTGAGE OR RENT ON TIME?: NO

## 2022-07-27 SDOH — HEALTH STABILITY: PHYSICAL HEALTH: ON AVERAGE, HOW MANY MINUTES DO YOU ENGAGE IN EXERCISE AT THIS LEVEL?: 90 MIN

## 2022-07-27 SDOH — ECONOMIC STABILITY: TRANSPORTATION INSECURITY
IN THE PAST 12 MONTHS, HAS LACK OF TRANSPORTATION KEPT YOU FROM MEETINGS, WORK, OR FROM GETTING THINGS NEEDED FOR DAILY LIVING?: NO

## 2022-07-27 SDOH — ECONOMIC STABILITY: HOUSING INSECURITY
IN THE LAST 12 MONTHS, WAS THERE A TIME WHEN YOU DID NOT HAVE A STEADY PLACE TO SLEEP OR SLEPT IN A SHELTER (INCLUDING NOW)?: NO

## 2022-07-27 SDOH — ECONOMIC STABILITY: TRANSPORTATION INSECURITY
IN THE PAST 12 MONTHS, HAS THE LACK OF TRANSPORTATION KEPT YOU FROM MEDICAL APPOINTMENTS OR FROM GETTING MEDICATIONS?: NO

## 2022-07-27 SDOH — ECONOMIC STABILITY: FOOD INSECURITY: WITHIN THE PAST 12 MONTHS, THE FOOD YOU BOUGHT JUST DIDN'T LAST AND YOU DIDN'T HAVE MONEY TO GET MORE.: NEVER TRUE

## 2022-07-27 SDOH — ECONOMIC STABILITY: HOUSING INSECURITY: IN THE LAST 12 MONTHS, HOW MANY PLACES HAVE YOU LIVED?: 2

## 2022-07-27 SDOH — HEALTH STABILITY: PHYSICAL HEALTH: ON AVERAGE, HOW MANY DAYS PER WEEK DO YOU ENGAGE IN MODERATE TO STRENUOUS EXERCISE (LIKE A BRISK WALK)?: 7 DAYS

## 2022-07-27 SDOH — ECONOMIC STABILITY: INCOME INSECURITY: HOW HARD IS IT FOR YOU TO PAY FOR THE VERY BASICS LIKE FOOD, HOUSING, MEDICAL CARE, AND HEATING?: NOT VERY HARD

## 2022-07-27 SDOH — ECONOMIC STABILITY: FOOD INSECURITY: WITHIN THE PAST 12 MONTHS, YOU WORRIED THAT YOUR FOOD WOULD RUN OUT BEFORE YOU GOT MONEY TO BUY MORE.: NEVER TRUE

## 2022-07-27 SDOH — ECONOMIC STABILITY: TRANSPORTATION INSECURITY
IN THE PAST 12 MONTHS, HAS LACK OF RELIABLE TRANSPORTATION KEPT YOU FROM MEDICAL APPOINTMENTS, MEETINGS, WORK OR FROM GETTING THINGS NEEDED FOR DAILY LIVING?: NO

## 2022-07-27 SDOH — HEALTH STABILITY: MENTAL HEALTH
STRESS IS WHEN SOMEONE FEELS TENSE, NERVOUS, ANXIOUS, OR CAN'T SLEEP AT NIGHT BECAUSE THEIR MIND IS TROUBLED. HOW STRESSED ARE YOU?: ONLY A LITTLE

## 2022-07-27 ASSESSMENT — LIFESTYLE VARIABLES
HOW MANY STANDARD DRINKS CONTAINING ALCOHOL DO YOU HAVE ON A TYPICAL DAY: 1 OR 2
SKIP TO QUESTIONS 9-10: 0
HOW OFTEN DO YOU HAVE SIX OR MORE DRINKS ON ONE OCCASION: MONTHLY
AUDIT-C TOTAL SCORE: 6
HOW OFTEN DO YOU HAVE A DRINK CONTAINING ALCOHOL: 4 OR MORE TIMES A WEEK

## 2022-07-27 ASSESSMENT — SOCIAL DETERMINANTS OF HEALTH (SDOH)
WITHIN THE PAST 12 MONTHS, YOU WORRIED THAT YOUR FOOD WOULD RUN OUT BEFORE YOU GOT THE MONEY TO BUY MORE: NEVER TRUE
DO YOU BELONG TO ANY CLUBS OR ORGANIZATIONS SUCH AS CHURCH GROUPS UNIONS, FRATERNAL OR ATHLETIC GROUPS, OR SCHOOL GROUPS?: NO
DO YOU BELONG TO ANY CLUBS OR ORGANIZATIONS SUCH AS CHURCH GROUPS UNIONS, FRATERNAL OR ATHLETIC GROUPS, OR SCHOOL GROUPS?: NO
IN A TYPICAL WEEK, HOW MANY TIMES DO YOU TALK ON THE PHONE WITH FAMILY, FRIENDS, OR NEIGHBORS?: THREE TIMES A WEEK
HOW OFTEN DO YOU GET TOGETHER WITH FRIENDS OR RELATIVES?: PATIENT DECLINED
HOW MANY DRINKS CONTAINING ALCOHOL DO YOU HAVE ON A TYPICAL DAY WHEN YOU ARE DRINKING: 1 OR 2
HOW OFTEN DO YOU HAVE A DRINK CONTAINING ALCOHOL: 4 OR MORE TIMES A WEEK
HOW HARD IS IT FOR YOU TO PAY FOR THE VERY BASICS LIKE FOOD, HOUSING, MEDICAL CARE, AND HEATING?: NOT VERY HARD
HOW OFTEN DO YOU ATTENT MEETINGS OF THE CLUB OR ORGANIZATION YOU BELONG TO?: NEVER
HOW OFTEN DO YOU ATTENT MEETINGS OF THE CLUB OR ORGANIZATION YOU BELONG TO?: NEVER
IN A TYPICAL WEEK, HOW MANY TIMES DO YOU TALK ON THE PHONE WITH FAMILY, FRIENDS, OR NEIGHBORS?: THREE TIMES A WEEK
HOW OFTEN DO YOU HAVE SIX OR MORE DRINKS ON ONE OCCASION: MONTHLY
HOW OFTEN DO YOU ATTEND CHURCH OR RELIGIOUS SERVICES?: NEVER
HOW OFTEN DO YOU GET TOGETHER WITH FRIENDS OR RELATIVES?: PATIENT DECLINED
HOW OFTEN DO YOU ATTEND CHURCH OR RELIGIOUS SERVICES?: NEVER

## 2022-07-28 ENCOUNTER — OFFICE VISIT (OUTPATIENT)
Dept: MEDICAL GROUP | Facility: MEDICAL CENTER | Age: 67
End: 2022-07-28
Payer: MEDICARE

## 2022-07-28 VITALS
DIASTOLIC BLOOD PRESSURE: 64 MMHG | HEIGHT: 66 IN | BODY MASS INDEX: 27.8 KG/M2 | WEIGHT: 173 LBS | HEART RATE: 90 BPM | OXYGEN SATURATION: 97 % | SYSTOLIC BLOOD PRESSURE: 104 MMHG | TEMPERATURE: 97.5 F

## 2022-07-28 DIAGNOSIS — E55.9 VITAMIN D DEFICIENCY: Chronic | ICD-10-CM

## 2022-07-28 DIAGNOSIS — E03.4 HYPOTHYROIDISM DUE TO ACQUIRED ATROPHY OF THYROID: ICD-10-CM

## 2022-07-28 DIAGNOSIS — M54.41 CHRONIC BILATERAL LOW BACK PAIN WITH RIGHT-SIDED SCIATICA: Chronic | ICD-10-CM

## 2022-07-28 DIAGNOSIS — F10.20 UNCOMPLICATED ALCOHOL DEPENDENCE (HCC): Chronic | ICD-10-CM

## 2022-07-28 DIAGNOSIS — Z00.00 MEDICARE ANNUAL WELLNESS VISIT, SUBSEQUENT: Primary | ICD-10-CM

## 2022-07-28 DIAGNOSIS — B35.4 TINEA CORPORIS: ICD-10-CM

## 2022-07-28 DIAGNOSIS — G89.29 CHRONIC BILATERAL LOW BACK PAIN WITH RIGHT-SIDED SCIATICA: Chronic | ICD-10-CM

## 2022-07-28 DIAGNOSIS — Z23 NEED FOR VACCINATION: ICD-10-CM

## 2022-07-28 DIAGNOSIS — F17.210 CIGARETTE NICOTINE DEPENDENCE WITHOUT COMPLICATION: Chronic | ICD-10-CM

## 2022-07-28 DIAGNOSIS — E78.5 DYSLIPIDEMIA: Chronic | ICD-10-CM

## 2022-07-28 DIAGNOSIS — Z13.6 SCREENING FOR AAA (ABDOMINAL AORTIC ANEURYSM): ICD-10-CM

## 2022-07-28 DIAGNOSIS — G47.30 SLEEP APNEA, UNSPECIFIED TYPE: ICD-10-CM

## 2022-07-28 DIAGNOSIS — E03.9 ACQUIRED HYPOTHYROIDISM: Chronic | ICD-10-CM

## 2022-07-28 DIAGNOSIS — E11.41 TYPE 2 DIABETES MELLITUS WITH DIABETIC MONONEUROPATHY, WITHOUT LONG-TERM CURRENT USE OF INSULIN (HCC): Chronic | ICD-10-CM

## 2022-07-28 DIAGNOSIS — I65.23 ATHEROSCLEROSIS OF BOTH CAROTID ARTERIES: ICD-10-CM

## 2022-07-28 PROCEDURE — G0439 PPPS, SUBSEQ VISIT: HCPCS | Performed by: NURSE PRACTITIONER

## 2022-07-28 PROCEDURE — 90677 PCV20 VACCINE IM: CPT | Performed by: NURSE PRACTITIONER

## 2022-07-28 PROCEDURE — G0009 ADMIN PNEUMOCOCCAL VACCINE: HCPCS | Performed by: NURSE PRACTITIONER

## 2022-07-28 RX ORDER — CLOTRIMAZOLE 1 %
1 CREAM (GRAM) TOPICAL 2 TIMES DAILY
Qty: 30 G | Refills: 0 | Status: SHIPPED | OUTPATIENT
Start: 2022-07-28 | End: 2022-08-04

## 2022-07-28 ASSESSMENT — ENCOUNTER SYMPTOMS: GENERAL WELL-BEING: EXCELLENT

## 2022-07-28 ASSESSMENT — PATIENT HEALTH QUESTIONNAIRE - PHQ9: CLINICAL INTERPRETATION OF PHQ2 SCORE: 0

## 2022-07-28 ASSESSMENT — ACTIVITIES OF DAILY LIVING (ADL): BATHING_REQUIRES_ASSISTANCE: 0

## 2022-07-28 NOTE — PROGRESS NOTES
Chief Complaint   Patient presents with    Annual Wellness Visit         HPI:  Kristian ALDRIDGE is a 67 y.o. here for Medicare Annual Wellness Visit      Completed retinal exam in March-Requesting records.   Has ivelisse tin October for Endocrinology.     Patient Active Problem List    Diagnosis Date Noted    Mild cognitive impairment 03/08/2022    Mononeuropathy 03/08/2022    Type 2 diabetes mellitus with diabetic mononeuropathy, without long-term current use of insulin (HCC) 08/09/2021    Uncomplicated alcohol dependence ([facility]) 05/08/2019    Carotid atherosclerosis 03/27/2018    Left cervical radiculopathy 01/29/2018    Allergic rhinitis 04/18/2016    Chronic bilateral low back pain with right-sided sciatica 04/18/2016    Abnormal EKG 02/11/2016    Cigarette nicotine dependence without complication 02/11/2016    Sleep apnea 02/11/2016    Tenosynovitis of wrist 02/11/2016    Vitamin D deficiency disease 12/04/2012    Dyslipidemia     Hypothyroid        Current Outpatient Medications   Medication Sig Dispense Refill    levothyroxine (SYNTHROID) 200 MCG Tab Take 1 Tablet by mouth every morning on an empty stomach. 90 Tablet 2    metFORMIN ER (GLUCOPHAGE XR) 500 MG TABLET SR 24 HR TAKE TWO TABLETS BY MOUTH TWICE A  Tablet 11    Semaglutide, 1 MG/DOSE, (OZEMPIC, 1 MG/DOSE,) 4 MG/3ML Solution Pen-injector Inject 1 mg under the skin every 7 days. 3 mL 11    Empagliflozin (JARDIANCE) 10 MG Tab Take 1 Tablet by mouth every day. 100 Tablet 3    simvastatin (ZOCOR) 40 MG Tab TAKE ONE TABLET BY MOUTH EVERY EVENING 100 Tablet 0    pioglitazone (ACTOS) 30 MG Tab Take 1 Tablet by mouth every day. 100 Tablet 3    cyanocobalamin (VITAMIN B12) 1000 MCG Tab Take 1 tablet by mouth every day. 30 tablet 11    FREESTYLE TEST STRIPS strip USE ONE NEW TEST STRIP EACH TIME TO TEST BLOOD SUGAR TWO TIMES A DAY AND AS NEEDED IF SYMPTOMS FOR HIGH OR LOW BLOOD SUGAR 100 Strip 0    Lancets Use one Freestyle lancet to test blood sugar twice daily. 100  Each 4    Blood Glucose Meter Kit Test blood sugar as recommended by provider. Freestyle blood glucose monitoring kit. 1 Kit 0    vitamin D (CHOLECALCIFEROL) 1000 UNIT Tab Take 3 Tabs by mouth every day. 60 Tab      No current facility-administered medications for this visit.        Patient is taking medications as noted in medication list.  Current supplements as per medication list.     Allergies: Patient has no known allergies.    Current social contact/activities: family, visit with at [place],     Is patient current with immunizations? Yes.    He  reports that he has been smoking. He has a 14.00 pack-year smoking history. He has never used smokeless tobacco. He reports current alcohol use of about 8.4 - 12.6 oz per week. He reports current drug use. Frequency: 4.00 times per week. Drugs: Marijuana and Inhaled.  Ready to quit: Not Answered  Counseling given: Not Answered        DPA/Advanced directive: Patient has Durable Power of  on file.     ROS:    Gait: Uses no assistive device   Ostomy: No   Other tubes: No   Amputations: No   Chronic oxygen use No   Last eye exam 3/2022   Wears hearing aids: No   : Denies any urinary leakage during the last 6 months      Screening:    Annual Health Assessment Questions:    1.  Are you currently engaging in any exercise or physical activity? Yes    2.  How would you describe your mood or emotional well-being today? good    3.  Have you had any falls in the last year? No    4.  Have you noticed any problems with your balance or had difficulty walking? No    5.  In the last six months have you experienced any leakage of urine? No    6. DPA/Advanced Directive: Patient has Durable Power of  on file.       Depression Screening  Little interest or pleasure in doing things?  0 - not at all  Feeling down, depressed, or hopeless? 0 - not at all  Patient Health Questionnaire Score: 0    If depressive symptoms identified deferred to follow up visit unless  specifically addressed in assessment and plan.    Interpretation of PHQ-9 Total Score   Score Severity   1-4 No Depression   5-9 Mild Depression   10-14 Moderate Depression   15-19 Moderately Severe Depression   20-27 Severe Depression    Screening for Cognitive Impairment  Three Minute Recall (daughter, heaven, mountain)  2/3    Finn clock face with all 12 numbers and set the hands to show 10 past 11.  Yes    If cognitive concerns identified, deferred for follow up unless specifically addressed in assessment and plan.    Fall Risk Assessment  Has the patient had two or more falls in the last year or any fall with injury in the last year?  No  If fall risk identified, deferred for follow up unless specifically addressed in assessment and plan.    Safety Assessment  Throw rugs on floor.  Yes  Handrails on all stairs.  Yes  Good lighting in all hallways.  Yes  Difficulty hearing.  No  Patient counseled about all safety risks that were identified.    Functional Assessment ADLs  Are there any barriers preventing you from cooking for yourself or meeting nutritional needs?  No.    Are there any barriers preventing you from driving safely or obtaining transportation?  No.    Are there any barriers preventing you from using a telephone or calling for help?  No.    Are there any barriers preventing you from shopping?  No.    Are there any barriers preventing you from taking care of your own finances?  No.    Are there any barriers preventing you from managing your medications?  No.    Are there any barriers preventing you from showering, bathing or dressing yourself?  No.    Are you currently engaging in any exercise or physical activity?  Yes.     What is your perception of your health?  Excellent.    Health Maintenance Summary            Overdue - RETINAL SCREENING (Yearly) Overdue since 2/18/2022 02/18/2021  Done    02/18/2020  REFERRAL FOR RETINAL SCREENING EXAM    02/15/2019  REFERRAL FOR RETINAL SCREENING EXAM     02/13/2018  REFERRAL FOR RETINAL SCREENING EXAM    02/07/2017  REFERRAL FOR RETINAL SCREENING EXAM    Only the first 5 history entries have been loaded, but more history exists.              Overdue - A1C SCREENING (Every 6 Months) Overdue since 8/23/2022 02/23/2022  POCT Hemoglobin A1C    10/11/2021  POCT Hemoglobin A1C    06/14/2021  POCT Hemoglobin A1C    02/03/2021  HEMOGLOBIN A1C    11/02/2020  POCT Hemoglobin A1C    Only the first 5 history entries have been loaded, but more history exists.              Overdue - IMM INFLUENZA (1) Overdue since 9/1/2022      10/25/2021  Imm Admin: Influenza Vaccine Adult HD    11/20/2020  Imm Admin: Influenza Vaccine Adult HD    10/01/2019  Imm Admin: Influenza Vaccine Adult HD    09/12/2018  Imm Admin: Influenza Vaccine Quad Inj (Preserved)    10/17/2017  Imm Admin: Influenza Vaccine Quad Inj (Preserved)    Only the first 5 history entries have been loaded, but more history exists.              DIABETES MONOFILAMENT / LE EXAM (Yearly) Due soon on 10/11/2022      10/11/2021  Diabetic Monofilament LE Exam    06/14/2021  Diabetic Monofilament LE Exam    06/16/2020  Diabetic Monofilament LE Exam    01/22/2019  Diabetic Monofilament Lower Extremity Exam    11/21/2017  Diabetic Monofilament Lower Extremity Exam    Only the first 5 history entries have been loaded, but more history exists.              FASTING LIPID PROFILE (Yearly) Next due on 2/16/2023 02/16/2022  Lipid Profile    02/03/2021  Lipid Profile    11/04/2020  Lipid Profile    06/19/2020  Lipid Profile    06/19/2020  Lipid Profile    Only the first 5 history entries have been loaded, but more history exists.              URINE ACR / MICROALBUMIN (Yearly) Next due on 2/16/2023 02/16/2022  MICROALBUMIN CREAT RATIO URINE    06/10/2021  MICROALBUMIN CREAT RATIO URINE    06/19/2020  MICROALBUMIN CREAT RATIO URINE    06/19/2020  MICROALBUMIN CREAT RATIO URINE (LAB COLLECT)    05/10/2019  MICROALBUMIN CREAT  RATIO URINE    Only the first 5 history entries have been loaded, but more history exists.              SERUM CREATININE (Yearly) Next due on 2/16/2023 02/16/2022  Comp Metabolic Panel    02/03/2021  Comp Metabolic Panel    06/19/2020  Basic Metabolic Panel    06/19/2020  Comp Metabolic Panel    05/10/2019  Comp Metabolic Panel    Only the first 5 history entries have been loaded, but more history exists.              COLORECTAL CANCER SCREENING (COLONOSCOPY - Every 3 Years) Next due on 8/12/2023 08/12/2020  REFERRAL TO GI FOR COLONOSCOPY    08/05/2015  AMB REFERRAL TO GI FOR COLONOSCOPY              Annual Wellness Visit (Every 366 Days) Next due on 9/12/2023 09/11/2022  Subsequent Annual Wellness Visit - Includes PPPS ()    08/30/2022  Visit Dx: Medicare annual wellness visit, subsequent    07/28/2022  Visit Dx: Medicare annual wellness visit, subsequent    03/08/2022  Level of Service: MS ANNUAL WELLNESS VISIT-INCLUDES PPPS SUBSEQUE*    08/09/2021  Subsequent Annual Wellness Visit - Includes PPPS ()    Only the first 5 history entries have been loaded, but more history exists.              IMM DTaP/Tdap/Td Vaccine (2 - Td or Tdap) Next due on 11/8/2023 11/08/2013  Imm Admin: Tdap Vaccine              IMM ZOSTER VACCINES (Series Information) Completed      07/01/2019  Imm Admin: Zoster Vaccine Recombinant (RZV) (SHINGRIX)    02/13/2019  Imm Admin: Zoster Vaccine Recombinant (RZV) (SHINGRIX)              HEPATITIS C SCREENING  Completed      06/19/2020  HEP C VIRUS ANTIBODY              COVID-19 Vaccine (Series Information) Completed      04/29/2022  Imm Admin: MODERNA SARS-COV-2 VACCINE (12+)    11/23/2021  Imm Admin: MODERNA SARS-COV-2 VACCINE (12+)    04/10/2021  Imm Admin: MODERNA SARS-COV-2 VACCINE (12+)    03/12/2021  Imm Admin: MODERNA SARS-COV-2 VACCINE (12+)              IMM PNEUMOCOCCAL VACCINE: 65+ Years (Series Information) Completed      07/28/2022  Imm Admin: Pneumococcal  Conjugate Vaccine (PCV20)    05/06/2019  Imm Admin: Pneumococcal Conjugate Vaccine (Prevnar/PCV-13)    10/17/2017  Imm Admin: Pneumococcal polysaccharide vaccine (PPSV-23)    07/18/2016  Imm Admin: Pneumococcal polysaccharide vaccine (PPSV-23)              ABDOMINAL AORTIC ANEURYSM (AAA) SCREEN  Completed      08/30/2022  -ABDOMINAL AORTA W/O DOPPLER              IMM MENINGOCOCCAL ACWY VACCINE (Series Information) Aged Out      No completion history exists for this topic.              Discontinued - IMM HEP B VACCINE  Discontinued      02/13/2019  Imm Admin: Hep A/HEP B Combined Vaccine ([employer]ix)                    Patient Care Team:  LEON Hernandez as PCP - General (Nurse Practitioner)  LEON Hernandez as PCP - [facility] Paneled  Tello Fuller O.D. as Consulting Physician (Optometry)  Jason Holbrook P.A.-C. (Endocrinology)  Tsering Rodriguez as    Lester Durham M.D. (Orthopedic Surgery)  TUNG SiddiqiRYOSHI (Nurse Practitioner Family)  Rony Nettles M.D. (Geriatrics)    Social History     Tobacco Use    Smoking status: Every Day     Packs/day: 0.50     Years: 28.00     Pack years: 14.00     Types: Cigarettes    Smokeless tobacco: Never   Vaping Use    Vaping Use: Never used   Substance Use Topics    Alcohol use: Yes     Alcohol/week: 8.4 - 12.6 oz     Types: 14 - 21 Cans of beer per week     Comment: 2-3 a day    Drug use: Yes     Frequency: 4.0 times per week     Types: Marijuana, Inhaled     Family History   Problem Relation Age of Onset    Diabetes Mother     Hypertension Mother     Hyperlipidemia Mother     Heart Disease Father     Hypertension Father     Hyperlipidemia Father     Heart Attack Father     Diabetes Sister     Hyperlipidemia Sister     Diabetes Brother         Type 2    Hypertension Brother     Hyperlipidemia Brother     Hypertension Sister     Diabetes Sister         type 2    Cancer Sister         Lung cancer    Lung Disease  "Sister      He  has a past medical history of Back pain, DIABETES MELLITUS, Diverticulitis, GOUT, Hyperlipidemia, Hypothyroid, Pneumonia, Post-nasal drip, and Rhinitis.   Past Surgical History:   Procedure Laterality Date    APPENDECTOMY      COLON RESECTION         Exam:     /64 (BP Location: Right arm, Patient Position: Sitting, BP Cuff Size: Adult)   Pulse 90   Temp 36.4 °C (97.5 °F) (Temporal)   Ht 1.676 m (5' 6\")   Wt 78.5 kg (173 lb)   SpO2 97%  Body mass index is 27.92 kg/m².    Hearing good.    Dentition good  Alert, oriented in no acute distress.  Eye contact is good, speech goal directed, affect calm      Assessment and Plan. The following treatment and monitoring plan is recommended:    1. Medicare annual wellness visit, subsequent   Have discussed recommended screenings and immunizations.  US-ABDOMINAL AORTA W/O DOPPLER    Pneumococcal Conjugate Vaccine 20-Valent (19 yrs+)      2. Screening for AAA (abdominal aortic aneurysm)   Recommend screening.  US-ABDOMINAL AORTA W/O DOPPLER      3. Tinea corporis   Acute issue. Recommend Lotrimin.        4. Need for vaccination  Pneumococcal Conjugate Vaccine 20-Valent (19 yrs+)      5. Uncomplicated alcohol dependence (HCC)   Chronic. Denies any issues with this. Does not interferes with his ADL's. Recommend monitoring usage.        6. Cigarette nicotine dependence without complication   Chronic. Recommend cessation.        7. Chronic bilateral low back pain with right-sided sciatica   Chronic problem. No red flags. Continue conservative management.        8. Dyslipidemia   Chronic. LDL not at goal. Continue statin, encourage lifestyle modifications.         9. Acquired hypothyroidism   Chronic. Recent TSH overcorrected. Followed by Endocrinology.        10. Type 2 diabetes mellitus with diabetic mononeuropathy, without long-term current use of insulin (HCC)   Chronic. Compliant with medications. Has f/u ivelisse with Endocrinology in October. Check feet " daily, encourage exercise.          11. Vitamin D deficiency disease   Chronic. Stable on current OTC regimen.       12. Sleep apnea, unspecified type          Chronic. Not treated d/t CPAP intolerance.     13. Atherosclerosis of both carotid arteries.         Stable problem. Last u/s carotids was in 2018. Recommend smoking cessation, increase          Activity, optimize glucose control and Bp control. Monitor for any symptoms.          Services suggested: No services needed at this time  Health Care Screening recommendations as per orders if indicated.  Referrals offered: PT/OT/Nutrition counseling/Behavioral Health/Smoking cessation as per orders if indicated.    Discussion today about general wellness and lifestyle habits:    Prevent falls and reduce trip hazards; Cautioned about securing or removing rugs.  Have a working fire alarm and carbon monoxide detector;   Engage in regular physical activity and social activities       Follow-up: Return in about 6 months (around 1/28/2023).

## 2022-08-15 ENCOUNTER — TELEPHONE (OUTPATIENT)
Dept: ENDOCRINOLOGY | Facility: MEDICAL CENTER | Age: 67
End: 2022-08-15
Payer: MEDICARE

## 2022-08-30 ENCOUNTER — HOSPITAL ENCOUNTER (OUTPATIENT)
Dept: RADIOLOGY | Facility: MEDICAL CENTER | Age: 67
End: 2022-08-30
Attending: NURSE PRACTITIONER
Payer: MEDICARE

## 2022-08-30 DIAGNOSIS — Z13.6 SCREENING FOR AAA (ABDOMINAL AORTIC ANEURYSM): ICD-10-CM

## 2022-08-30 DIAGNOSIS — Z00.00 MEDICARE ANNUAL WELLNESS VISIT, SUBSEQUENT: ICD-10-CM

## 2022-08-30 PROCEDURE — 76775 US EXAM ABDO BACK WALL LIM: CPT

## 2022-09-11 PROBLEM — E66.9 OBESITY (BMI 30-39.9): Status: RESOLVED | Noted: 2020-05-26 | Resolved: 2022-09-11

## 2022-09-28 ENCOUNTER — OFFICE VISIT (OUTPATIENT)
Dept: URGENT CARE | Facility: PHYSICIAN GROUP | Age: 67
End: 2022-09-28
Payer: MEDICARE

## 2022-09-28 VITALS
RESPIRATION RATE: 16 BRPM | OXYGEN SATURATION: 99 % | HEART RATE: 80 BPM | HEIGHT: 66 IN | SYSTOLIC BLOOD PRESSURE: 110 MMHG | TEMPERATURE: 97.1 F | BODY MASS INDEX: 28.12 KG/M2 | DIASTOLIC BLOOD PRESSURE: 60 MMHG | WEIGHT: 175 LBS

## 2022-09-28 DIAGNOSIS — G89.29 CHRONIC RIGHT-SIDED LOW BACK PAIN WITH RIGHT-SIDED SCIATICA: ICD-10-CM

## 2022-09-28 DIAGNOSIS — M54.41 CHRONIC RIGHT-SIDED LOW BACK PAIN WITH RIGHT-SIDED SCIATICA: ICD-10-CM

## 2022-09-28 PROCEDURE — 99213 OFFICE O/P EST LOW 20 MIN: CPT

## 2022-09-28 RX ORDER — KETOROLAC TROMETHAMINE 30 MG/ML
30 INJECTION, SOLUTION INTRAMUSCULAR; INTRAVENOUS ONCE
Status: COMPLETED | OUTPATIENT
Start: 2022-09-28 | End: 2022-09-28

## 2022-09-28 RX ORDER — NAPROXEN 500 MG/1
500 TABLET ORAL 2 TIMES DAILY WITH MEALS
Qty: 30 TABLET | Refills: 0 | Status: SHIPPED
Start: 2022-09-28 | End: 2023-07-14

## 2022-09-28 RX ORDER — CYCLOBENZAPRINE HCL 5 MG
5-10 TABLET ORAL 3 TIMES DAILY PRN
Qty: 30 TABLET | Refills: 0 | Status: SHIPPED | OUTPATIENT
Start: 2022-09-28 | End: 2022-10-12

## 2022-09-28 RX ORDER — PREDNISONE 10 MG/1
40 TABLET ORAL DAILY
Qty: 20 TABLET | Refills: 0 | Status: SHIPPED | OUTPATIENT
Start: 2022-09-28 | End: 2022-10-03

## 2022-09-28 RX ADMIN — KETOROLAC TROMETHAMINE 30 MG: 30 INJECTION, SOLUTION INTRAMUSCULAR; INTRAVENOUS at 09:31

## 2022-09-28 NOTE — PROGRESS NOTES
"Subjective:   Kristian Frank is a 67 y.o. male who presents for Back Pain (Has hx of disc damage and started having pain on Friday and pain radiating down (R) leg and difficulty walking)      HPI:  Patient presents with chronic low back pain. Patient states he has history of \"disc damage\" at L5. On Friday, he reports his back pain increased and he is having radiation of pain down his right leg with an intermittent numb foot. Patient states he was once established with [facility]surgery for similar complaint.  Denies bowel/bladder changes, history of spinal surgery/epidurals. Denies fever, chills, night sweats, nausea, vomiting, diarrhea. Patient denies discoloration, reduced range of motion. He states pain is aggravated with sitting and standing, improved with gentle activity. Rates pain as 7/10. He has been taking IBU and his wife's muscle relaxer for symptom control. He reports he has MRI result at home indicating he has disc disease. No MRI available in Epic to review.    ROS as above per HPI    Medications:    Current Outpatient Medications on File Prior to Visit   Medication Sig Dispense Refill    levothyroxine (SYNTHROID) 200 MCG Tab Take 1 Tablet by mouth every morning on an empty stomach. 90 Tablet 2    metFORMIN ER (GLUCOPHAGE XR) 500 MG TABLET SR 24 HR TAKE TWO TABLETS BY MOUTH TWICE A  Tablet 11    Semaglutide, 1 MG/DOSE, (OZEMPIC, 1 MG/DOSE,) 4 MG/3ML Solution Pen-injector Inject 1 mg under the skin every 7 days. 3 mL 11    Empagliflozin (JARDIANCE) 10 MG Tab Take 1 Tablet by mouth every day. 100 Tablet 3    simvastatin (ZOCOR) 40 MG Tab TAKE ONE TABLET BY MOUTH EVERY EVENING 100 Tablet 0    pioglitazone (ACTOS) 30 MG Tab Take 1 Tablet by mouth every day. 100 Tablet 3    cyanocobalamin (VITAMIN B12) 1000 MCG Tab Take 1 tablet by mouth every day. 30 tablet 11    FREESTYLE TEST STRIPS strip USE ONE NEW TEST STRIP EACH TIME TO TEST BLOOD SUGAR TWO TIMES A DAY AND AS NEEDED IF SYMPTOMS FOR HIGH " "OR LOW BLOOD SUGAR 100 Strip 0    Lancets Use one Freestyle lancet to test blood sugar twice daily. 100 Each 4    Blood Glucose Meter Kit Test blood sugar as recommended by provider. Freestyle blood glucose monitoring kit. 1 Kit 0    vitamin D (CHOLECALCIFEROL) 1000 UNIT Tab Take 3 Tabs by mouth every day. 60 Tab      No current facility-administered medications on file prior to visit.        Allergies:   Patient has no known allergies.    Problem List:   Patient Active Problem List   Diagnosis    Dyslipidemia    Hypothyroid    Vitamin D deficiency disease    Abnormal EKG    Cigarette nicotine dependence without complication    Sleep apnea    Tenosynovitis of wrist    Allergic rhinitis    Chronic bilateral low back pain with right-sided sciatica    Left cervical radiculopathy    Carotid atherosclerosis    Uncomplicated alcohol dependence (HCC)    Type 2 diabetes mellitus with diabetic mononeuropathy, without long-term current use of insulin (HCC)    Mild cognitive impairment    Mononeuropathy        Surgical History:  Past Surgical History:   Procedure Laterality Date    APPENDECTOMY      COLON RESECTION         Past Social Hx:   Social History     Tobacco Use    Smoking status: Every Day     Packs/day: 0.50     Years: 28.00     Pack years: 14.00     Types: Cigarettes    Smokeless tobacco: Never   Vaping Use    Vaping Use: Never used   Substance Use Topics    Alcohol use: Yes     Alcohol/week: 8.4 - 12.6 oz     Types: 14 - 21 Cans of beer per week     Comment: 2-3 a day    Drug use: Yes     Frequency: 4.0 times per week     Types: Marijuana, Inhaled          Problem list, medications, and allergies reviewed by myself today in Epic.     Objective:     /60 (BP Location: Left arm, Patient Position: Sitting, BP Cuff Size: Large adult)   Pulse 80   Temp 36.2 °C (97.1 °F) (Temporal)   Resp 16   Ht 1.676 m (5' 6\")   Wt 79.4 kg (175 lb)   SpO2 99%   BMI 28.25 kg/m²     Physical Exam  Vitals and nursing note " reviewed.   Constitutional:       General: He is not in acute distress.     Appearance: Normal appearance. He is not ill-appearing or diaphoretic.   HENT:      Head: Normocephalic and atraumatic.   Eyes:      Conjunctiva/sclera: Conjunctivae normal.   Cardiovascular:      Rate and Rhythm: Normal rate and regular rhythm.      Heart sounds: Normal heart sounds.   Pulmonary:      Effort: Pulmonary effort is normal.      Breath sounds: Normal breath sounds.   Abdominal:      General: Abdomen is flat. Bowel sounds are normal. There is no distension.      Palpations: Abdomen is soft. There is no mass.      Tenderness: There is no abdominal tenderness. There is no right CVA tenderness, left CVA tenderness, guarding or rebound.      Hernia: No hernia is present.   Musculoskeletal:      Lumbar back: Spasms and tenderness present. No swelling, edema, deformity, signs of trauma, lacerations or bony tenderness. Normal range of motion. Positive right straight leg raise test.   Skin:     General: Skin is warm and dry.      Capillary Refill: Capillary refill takes less than 2 seconds.      Findings: No rash.   Neurological:      Mental Status: He is alert and oriented to person, place, and time.       Assessment/Plan:     Diagnosis and associated orders:   1. Chronic right-sided low back pain with right-sided sciatica  - Referral to Neurosurgery  - cyclobenzaprine (FLEXERIL) 5 mg tablet; Take 1-2 Tablets by mouth 3 times a day as needed for Muscle Spasms or Moderate Pain for up to 14 days.  Dispense: 30 Tablet; Refill: 0  - predniSONE (DELTASONE) 10 MG Tab; Take 4 Tablets by mouth every day for 5 days.  Dispense: 20 Tablet; Refill: 0  - naproxen (NAPROSYN) 500 MG Tab; Take 1 Tablet by mouth 2 times a day with meals.  Dispense: 30 Tablet; Refill: 0  - ketorolac (TORADOL) injection 30 mg      Comments/MDM:     Avoid use of IBU and Naproxen. Use tylenol for the remainder of the day  Gentle back exercises - see patient  instructions  Rest back on ice pack for 20-30 minutes three times a day; heat pads  Patient counseled to avoid use of alcohol and driving while using Flexeril  Referral to neurosurgery placed, referral process explained to patient  Patient verbalized understanding and consented to plan of care           Pt is clinically stable at today's acute urgent care visit.  No acute distress noted. Appropriate for outpatient management at this time.       Discussed DDx, management options (risks,benefits, and alternatives to planned treatment), natural progression and supportive care.  Expressed understanding and the treatment plan was agreed upon. Questions were encouraged and answered   Return to urgent care prn if new or worsening sx or if there is no improvement in condition prn.    Educated in Red flags and indications to immediately call 911 or present to the Emergency Department.   Advised the patient to follow-up with the primary care physician for recheck, reevaluation, and consideration of further management.      Please note that this dictation was created using voice recognition software. I have made a reasonable attempt to correct obvious errors, but I expect that there are errors of grammar and possibly content that I did not discover before finalizing the note.    This note was electronically signed by Haley Roman, BEATRIZ

## 2022-10-18 ENCOUNTER — OFFICE VISIT (OUTPATIENT)
Dept: ENDOCRINOLOGY | Facility: MEDICAL CENTER | Age: 67
End: 2022-10-18
Attending: INTERNAL MEDICINE
Payer: MEDICARE

## 2022-10-18 VITALS
SYSTOLIC BLOOD PRESSURE: 122 MMHG | BODY MASS INDEX: 28.93 KG/M2 | HEIGHT: 66 IN | WEIGHT: 180 LBS | DIASTOLIC BLOOD PRESSURE: 82 MMHG | HEART RATE: 90 BPM | OXYGEN SATURATION: 97 %

## 2022-10-18 DIAGNOSIS — Z79.84 LONG TERM (CURRENT) USE OF ORAL HYPOGLYCEMIC DRUGS: ICD-10-CM

## 2022-10-18 DIAGNOSIS — E11.9 CONTROLLED TYPE 2 DIABETES MELLITUS WITHOUT COMPLICATION, WITHOUT LONG-TERM CURRENT USE OF INSULIN (HCC): ICD-10-CM

## 2022-10-18 DIAGNOSIS — E03.9 HYPOTHYROIDISM (ACQUIRED): ICD-10-CM

## 2022-10-18 DIAGNOSIS — E78.5 DYSLIPIDEMIA: ICD-10-CM

## 2022-10-18 DIAGNOSIS — E55.9 VITAMIN D DEFICIENCY: ICD-10-CM

## 2022-10-18 LAB
HBA1C MFR BLD: 6.7 % (ref 0–5.6)
INT CON NEG: ABNORMAL
INT CON POS: ABNORMAL

## 2022-10-18 PROCEDURE — 99214 OFFICE O/P EST MOD 30 MIN: CPT | Performed by: INTERNAL MEDICINE

## 2022-10-18 PROCEDURE — 99212 OFFICE O/P EST SF 10 MIN: CPT | Performed by: INTERNAL MEDICINE

## 2022-10-18 PROCEDURE — 83036 HEMOGLOBIN GLYCOSYLATED A1C: CPT | Performed by: INTERNAL MEDICINE

## 2022-10-18 PROCEDURE — 92250 FUNDUS PHOTOGRAPHY W/I&R: CPT | Performed by: INTERNAL MEDICINE

## 2022-10-18 RX ORDER — ATORVASTATIN CALCIUM 40 MG/1
40 TABLET, FILM COATED ORAL DAILY
Qty: 90 TABLET | Refills: 2 | Status: SHIPPED | OUTPATIENT
Start: 2022-10-18 | End: 2023-07-06

## 2022-10-18 RX ORDER — PIOGLITAZONEHYDROCHLORIDE 30 MG/1
30 TABLET ORAL DAILY
Qty: 100 TABLET | Refills: 3 | Status: SHIPPED | OUTPATIENT
Start: 2022-10-18 | End: 2023-11-27

## 2022-10-18 RX ORDER — METFORMIN HYDROCHLORIDE 500 MG/1
1000 TABLET, EXTENDED RELEASE ORAL 2 TIMES DAILY
Qty: 360 TABLET | Refills: 2 | Status: SHIPPED | OUTPATIENT
Start: 2022-10-18 | End: 2023-12-31

## 2022-10-18 RX ORDER — LEVOTHYROXINE SODIUM 137 UG/1
137 TABLET ORAL
Qty: 90 TABLET | Refills: 2 | Status: SHIPPED | OUTPATIENT
Start: 2022-10-18 | End: 2024-03-07

## 2022-10-18 RX ORDER — EMPAGLIFLOZIN 10 MG/1
1 TABLET, FILM COATED ORAL DAILY
Qty: 100 TABLET | Refills: 3 | Status: SHIPPED | OUTPATIENT
Start: 2022-10-18 | End: 2023-11-27

## 2022-10-18 NOTE — PROGRESS NOTES
CHIEF COMPLAINT: Patient is here for follow up of Type 2 Diabetes Mellitus    HPI:     Kristian Frank is a 67 y.o. male with Type 2 Diabetes Mellitus here for follow up.      Labs from 10/18/2022 HbA1c is 6.7%    He was previously seen by the nurse practitioner and this is my first time meeting him.  Comorbid issues include hypothyroidism, hyperlipidemia  He denies a history of coronary artery disease and cerebrovascular disease        He is on  Metformin extended release 1000 mg twice a day  Actos 30 mg daily  Jardiance 10 mg daily  Ozempic 1 mg weekly      He denies SE with his medications  He is getting patient assistance for his Ozempic  We talked about possibly getting patient assistance for his Jardiance      He has hyperlipidemia  He is taking simvastatin 40 mg daily  I discussed replacing this with a better statin  His LDL cholesterol was 85 on February 2022        He doesn't have diabetic kidney disease  U ACR was less than 30 on February 2022      We discussed getting an eye exam today      He has hypothyroidism and is taking Levothyroxine 200mcg daily 5 days a week which is equal to 145mcg daily more or less  He denies palpitations and tremors     His previous TSH was slightly suppressed at 0.120  With a free T4 of 1.53 and free T3 of 3.12 on February 6, 2022 so        BG Diary:10/18/22  Patient did not bring his glucose meter    Weight has been stable    Diabetes Complications   Retinopathy: No known retinopathy.  Last eye exam: We are getting a point-of-care eye exam today  Neuropathy: Denies paresthesias or numbness in hands or feet. Denies any foot wounds.  Exercise: Minimal.  Diet: Fair.  Patient's medications, allergies, and social histories were reviewed and updated as appropriate.    ROS:     CONS:     No fever, no chills   EYES:     No diplopia, no blurry vision   CV:           No chest pain, no palpitations   PULM:     No SOB, no cough, no hemoptysis.   GI:            No nausea, no  vomiting, no diarrhea, no constipation   ENDO:     No polyuria, no polydipsia, no heat intolerance, no cold intolerance       Past Medical History:  Problem List:  2022-03: BMI 29.0-29.9,adult  2022-03: Mild cognitive impairment  2022-03: Mononeuropathy  2021-08: Type 2 diabetes mellitus with diabetic mononeuropathy,   without long-term current use of insulin ([facility]ly [facility])  2020-05: Obesity (BMI 30-39.9)  2019-05: Uncomplicated alcohol dependence (Formerly [facility])  2019-05: Essential hypertension  2018-06: Obesity (BMI 30-39.9)  2018-03: Carotid atherosclerosis  2018-01: Left cervical radiculopathy  2018-01: Left hand weakness  2016-04: Allergic rhinitis  2016-04: Post-nasal drip  2016-04: Chronic bilateral low back pain with right-sided sciatica  2016-02: Abnormal EKG  2016-02: Cigarette nicotine dependence without complication  2016-02: Obesity (BMI 30.0-34.9)  2016-02: Epigastric abdominal pain  2016-02: Sleep apnea  2016-02: Tenosynovitis of wrist  2013-10: Type 2 diabetes mellitus with hemoglobin A1c goal of less   than 7.0% (Formerly [facility])  2012-12: Vitamin D deficiency disease  Dyslipidemia  DIABETES MELLITUS  Hypothyroid  Diverticulitis      Past Surgical History:  Past Surgical History:   Procedure Laterality Date    APPENDECTOMY      COLON RESECTION          Allergies:  Patient has no known allergies.     Social History:  Social History     Tobacco Use    Smoking status: Every Day     Packs/day: 0.50     Years: 28.00     Pack years: 14.00     Types: Cigarettes    Smokeless tobacco: Never   Vaping Use    Vaping Use: Never used   Substance Use Topics    Alcohol use: Yes     Alcohol/week: 8.4 - 12.6 oz     Types: 14 - 21 Cans of beer per week     Comment: 2-3 a day    Drug use: Yes     Frequency: 4.0 times per week     Types: Marijuana, Inhaled        Family History:   family history includes Cancer in his sister; Diabetes in his brother, mother, sister, and sister; Heart Attack in his father; Heart Disease in his father; Hyperlipidemia  "in his brother, father, mother, and sister; Hypertension in his brother, father, mother, and sister; Lung Disease in his sister.      PHYSICAL EXAM:   OBJECTIVE:  Vital signs: /82 (BP Location: Left arm, Patient Position: Sitting, BP Cuff Size: Adult)   Pulse 90   Ht 1.676 m (5' 6\")   Wt 81.6 kg (180 lb)   SpO2 97%   BMI 29.05 kg/m²   GENERAL: Well-developed, well-nourished in no apparent distress.   EYE:  No ocular asymmetry, PERRLA  HENT: Pink, moist mucous membranes.    NECK: No thyromegaly.   CARDIOVASCULAR:  No murmurs  LUNGS: Clear breath sounds  ABDOMEN: Soft, nontender   EXTREMITIES: No clubbing, cyanosis, or edema.   NEUROLOGICAL: No gross focal motor abnormalities   LYMPH: No cervical adenopathy palpated.   SKIN: No rashes, lesions.     Labs:  Lab Results   Component Value Date/Time    HBA1C 6.7 (A) 10/18/2022 07:41 AM        Lab Results   Component Value Date/Time    WBC 7.5 06/19/2020 07:40 AM    RBC 5.14 06/19/2020 07:40 AM    HEMOGLOBIN 16.1 06/19/2020 07:40 AM    MCV 94.6 06/19/2020 07:40 AM    MCH 31.3 06/19/2020 07:40 AM    MCHC 33.1 (L) 06/19/2020 07:40 AM    RDW 50.2 (H) 06/19/2020 07:40 AM    MPV 11.4 06/19/2020 07:40 AM       Lab Results   Component Value Date/Time    SODIUM 141 02/16/2022 07:37 AM    POTASSIUM 4.4 02/16/2022 07:37 AM    CHLORIDE 102 02/16/2022 07:37 AM    CO2 23 02/16/2022 07:37 AM    ANION 16.0 02/16/2022 07:37 AM    GLUCOSE 141 (H) 02/16/2022 07:37 AM    BUN 15 02/16/2022 07:37 AM    CREATININE 0.92 02/16/2022 07:37 AM    CREATININE 0.93 03/22/2013 07:21 AM    CALCIUM 9.8 02/16/2022 07:37 AM    ASTSGOT 16 02/16/2022 07:37 AM    ALTSGPT 19 02/16/2022 07:37 AM    TBILIRUBIN 0.4 02/16/2022 07:37 AM    ALBUMIN 4.7 02/16/2022 07:37 AM    TOTPROTEIN 7.5 02/16/2022 07:37 AM    GLOBULIN 2.8 02/16/2022 07:37 AM    AGRATIO 1.7 02/16/2022 07:37 AM       Lab Results   Component Value Date/Time    CHOLSTRLTOT 161 02/16/2022 0737    TRIGLYCERIDE 128 02/16/2022 0737    HDL 50 " 02/16/2022 0737    LDL 85 02/16/2022 0737       Lab Results   Component Value Date/Time    MICROALBCALC 5.9 10/16/2013 07:12 AM    MALBCRT see below 02/16/2022 07:37 AM    MICROALBUR <1.2 02/16/2022 07:37 AM    MICRALB 7.4 10/16/2013 07:12 AM        Lab Results   Component Value Date/Time    TSHULTRASEN 0.120 (L) 02/16/2022 0737     No results found for: FREEDIR  Lab Results   Component Value Date/Time    FREET3 3.12 02/16/2022 0737     No results found for: THYSTIMIG        ASSESSMENT/PLAN:     1. Controlled type 2 diabetes mellitus without complication, without long-term current use of insulin (HCC)  Controlled  Continue Ozempic 1 mg once a week   Continue metformin max dose  Continue Actos max dose  Continue Jardiance 10 mg daily  Will contact pharmacy staff to ask for patient assistance  He is up-to-date with his labs  Foot exam was completed today  We are getting an eye exam  Follow-up in 3 months with repeat of A1c    2. Hypothyroidism (acquired)  Uncontrolled  Levothyroxine 5 days a week is equivalent to 145 every day  I am switching him to levothyroxine 137 MCG daily  Repeat thyroid labs in 2 to 3 months    3. Dyslipidemia  Controlled  Stop simvastatin  Replace with atorvastatin  Repeat fasting lipids in 3 months    4. Vitamin D deficiency  Stable  Continue monitoring    5. Long term (current) use of oral hypoglycemic drugs  Patient is on multiple oral agents and a GLP-1 for type 2 diabetes management      Return in about 6 months (around 4/18/2023).      This patient during there office visit today was started on a new medication.  Side effects of the new medication were discussed with the patient today in the office.     Thank you kindly for allowing me to participate in the diabetes care plan for this patient.    Kristian Jones MD, [facility], [facility]  10/18/22    CC:   Vidhi Bautista, LEON

## 2022-10-25 LAB — RETINAL SCREEN: NEGATIVE

## 2022-11-18 ENCOUNTER — TELEPHONE (OUTPATIENT)
Dept: ENDOCRINOLOGY | Facility: MEDICAL CENTER | Age: 67
End: 2022-11-18
Payer: MEDICARE

## 2022-11-29 ENCOUNTER — DOCUMENTATION (OUTPATIENT)
Dept: HEALTH INFORMATION MANAGEMENT | Facility: OTHER | Age: 67
End: 2022-11-29
Payer: MEDICARE

## 2023-02-01 ENCOUNTER — OFFICE VISIT (OUTPATIENT)
Dept: URGENT CARE | Facility: PHYSICIAN GROUP | Age: 68
End: 2023-02-01
Payer: MEDICARE

## 2023-02-01 VITALS
OXYGEN SATURATION: 96 % | HEART RATE: 84 BPM | WEIGHT: 175 LBS | RESPIRATION RATE: 18 BRPM | HEIGHT: 66 IN | DIASTOLIC BLOOD PRESSURE: 80 MMHG | SYSTOLIC BLOOD PRESSURE: 122 MMHG | BODY MASS INDEX: 28.12 KG/M2 | TEMPERATURE: 97.3 F

## 2023-02-01 DIAGNOSIS — S61.412A LACERATION OF SKIN OF LEFT HAND, INITIAL ENCOUNTER: ICD-10-CM

## 2023-02-01 PROCEDURE — 90715 TDAP VACCINE 7 YRS/> IM: CPT | Performed by: PHYSICIAN ASSISTANT

## 2023-02-01 PROCEDURE — 90471 IMMUNIZATION ADMIN: CPT | Performed by: PHYSICIAN ASSISTANT

## 2023-02-01 PROCEDURE — 12042 INTMD RPR N-HF/GENIT2.6-7.5: CPT | Performed by: PHYSICIAN ASSISTANT

## 2023-02-01 RX ORDER — LEVOTHYROXINE SODIUM 0.2 MG/1
TABLET ORAL
COMMUNITY
Start: 2022-11-08 | End: 2023-03-21

## 2023-02-01 ASSESSMENT — ENCOUNTER SYMPTOMS
MUSCULOSKELETAL NEGATIVE: 1
CONSTITUTIONAL NEGATIVE: 1
NEUROLOGICAL NEGATIVE: 1
CARDIOVASCULAR NEGATIVE: 1
RESPIRATORY NEGATIVE: 1

## 2023-02-01 NOTE — PROGRESS NOTES
"  Subjective:     Kristian Frank  is a 67 y.o. male who presents for Laceration (Cut left hand with saw )       Patient presents today after suffering a laceration of the dorsal aspect of his left hand while using a miter saw 20 minutes ago.  Patient states that immediately following the injury he experienced bleeding.  No loss of hand or finger range of motion, no loss of hand or finger strength, no numbness and tingling distally.  Distal neurovascular function intact.  Patient states that his tetanus was last given within the last 5-6 years but is not fully confident in this answer     Review of Systems   Constitutional: Negative.    Respiratory: Negative.     Cardiovascular: Negative.    Musculoskeletal: Negative.    Skin:         Last seen on the dorsal aspect of the left hand   Neurological: Negative.     No Known Allergies  Past Medical History:   Diagnosis Date    Back pain     ddd    DIABETES MELLITUS     Diverticulitis     GOUT     Hyperlipidemia     Hypothyroid     Pneumonia     Post-nasal drip     Rhinitis         Objective:   /80   Pulse 84   Temp 36.3 °C (97.3 °F) (Temporal)   Resp 18   Ht 1.676 m (5' 6\")   Wt 79.4 kg (175 lb)   SpO2 96%   BMI 28.25 kg/m²   Physical Exam  Vitals and nursing note reviewed.   Constitutional:       General: He is not in acute distress.     Appearance: He is not ill-appearing or toxic-appearing.   HENT:      Head: Normocephalic.      Nose: No rhinorrhea.   Eyes:      General: No scleral icterus.     Conjunctiva/sclera: Conjunctivae normal.   Pulmonary:      Effort: Pulmonary effort is normal. No respiratory distress.      Breath sounds: No stridor.   Musculoskeletal:      Cervical back: Neck supple.      Comments: Range of motion of all fingers is intact.  Range of motion of wrist is intact.  Strength of the fingers and intermittent digital muscles is intact.  Patient is able to oppose the thumb to all 4 fingers.  Distal neurovascular function intact "   Skin:     Comments: Examination left hand does reveal a 3 cm laceration present over the dorsal aspect of the left hand.  Laceration is between the first and second metacarpal region.  Laceration extends through the dermal layers, subcutaneous layers and the muscle layer is visible but not damaged.  Tendon structures are visible.  No foreign body noted.  Active bleeding at this time   Neurological:      Mental Status: He is alert and oriented to person, place, and time.   Psychiatric:         Mood and Affect: Mood normal.         Behavior: Behavior normal.         Thought Content: Thought content normal.         Judgment: Judgment normal.           Diagnostic testing: None    Assessment/Plan:     Encounter Diagnoses   Name Primary?    Laceration of skin of left hand, initial encounter      Procedure: Laceration Repair of left dorsal hand  -Risks including bleeding, nerve damage, infection, and poor cosmetic outcome discussed. Benefits and alternatives discussed.   -Clean technique with sterile instruments and suture used  -Local anesthesia with 1% lidocaine without epinephrine  -Closed with # 3 4-0 Vicryl simple interrupted subcutaneous sutures and #3 4-0 nylon interrupted sutures with good wound approximation  -Polysporin and dressing placed  -Patient tolerated well  -Patient will follow up in 10 days for suture removal       Plan for care for today's complaint includes prophylactic Keflex antibiotic prescription as the wound did occur from a miter saw.  Laceration repair was performed today, please see the above stated procedure details for more information.  We will have the patient follow-up in 10 days.  Last tetanus shot was in 2013, did update this today.  Return to urgent care or follow-up in the emergency department if symptoms worsen or become severe..    See AVS Instructions below for written guidance provided to patient on after-visit management and care in addition to our verbal discussion during the  visit.    Please note that this dictation was created using voice recognition software. I have attempted to correct all errors, but there may be sound-alike, spelling, grammar and possibly content errors that I did not discover before finalizing the note.    Ronnie Avilez PA-C

## 2023-02-03 DIAGNOSIS — S61.412A LACERATION OF SKIN OF LEFT HAND, INITIAL ENCOUNTER: ICD-10-CM

## 2023-02-03 RX ORDER — CEPHALEXIN 500 MG/1
500 CAPSULE ORAL 2 TIMES DAILY
Qty: 10 CAPSULE | Refills: 0 | Status: SHIPPED | OUTPATIENT
Start: 2023-02-03 | End: 2023-02-08

## 2023-02-09 PROBLEM — E11.69 DYSLIPIDEMIA ASSOCIATED WITH TYPE 2 DIABETES MELLITUS (HCC): Status: ACTIVE | Noted: 2023-02-09

## 2023-02-09 PROBLEM — E11.41 DIABETIC MONONEUROPATHY ASSOCIATED WITH TYPE 2 DIABETES MELLITUS (HCC): Status: ACTIVE | Noted: 2022-03-08

## 2023-02-09 PROBLEM — E78.5 DYSLIPIDEMIA ASSOCIATED WITH TYPE 2 DIABETES MELLITUS (HCC): Status: ACTIVE | Noted: 2023-02-09

## 2023-02-10 ENCOUNTER — OFFICE VISIT (OUTPATIENT)
Dept: URGENT CARE | Facility: PHYSICIAN GROUP | Age: 68
End: 2023-02-10
Payer: MEDICARE

## 2023-02-10 VITALS
RESPIRATION RATE: 16 BRPM | WEIGHT: 181.2 LBS | SYSTOLIC BLOOD PRESSURE: 118 MMHG | HEART RATE: 76 BPM | HEIGHT: 65 IN | TEMPERATURE: 97.5 F | OXYGEN SATURATION: 98 % | BODY MASS INDEX: 30.19 KG/M2 | DIASTOLIC BLOOD PRESSURE: 66 MMHG

## 2023-02-10 DIAGNOSIS — Z48.02 VISIT FOR SUTURE REMOVAL: ICD-10-CM

## 2023-02-10 PROCEDURE — 99212 OFFICE O/P EST SF 10 MIN: CPT | Performed by: NURSE PRACTITIONER

## 2023-02-10 NOTE — PROGRESS NOTES
Kristian Frank is a 67 y.o. male who presents for Follow-Up and Wound Check      HPI here today for suture removal from his left hand.  Sutures were placed on February 1, 2023.    ROS See HPI    Allergies:     No Known Allergies    PMSFS Hx:  Past Medical History:   Diagnosis Date    Back pain     ddd    DIABETES MELLITUS     Diverticulitis     Dyslipidemia     GOUT     Hyperlipidemia     Hypothyroid     Pneumonia     Post-nasal drip     Rhinitis      Past Surgical History:   Procedure Laterality Date    APPENDECTOMY      COLON RESECTION       Family History   Problem Relation Age of Onset    Diabetes Mother     Hypertension Mother     Hyperlipidemia Mother     Heart Disease Father     Hypertension Father     Hyperlipidemia Father     Heart Attack Father     Diabetes Sister     Hyperlipidemia Sister     Diabetes Brother         Type 2    Hypertension Brother     Hyperlipidemia Brother     Hypertension Sister     Diabetes Sister         type 2    Cancer Sister         Lung cancer    Lung Disease Sister      Social History     Tobacco Use    Smoking status: Every Day     Packs/day: 0.50     Years: 28.00     Pack years: 14.00     Types: Cigarettes    Smokeless tobacco: Never   Substance Use Topics    Alcohol use: Yes     Alcohol/week: 8.4 - 12.6 oz     Types: 14 - 21 Cans of beer per week     Comment: 2-3 a day       Problems:   Patient Active Problem List   Diagnosis    Hypothyroidism (acquired)    Vitamin D deficiency disease    Abnormal EKG    Cigarette nicotine dependence without complication    Sleep apnea    Tenosynovitis of wrist    Allergic rhinitis    Chronic bilateral low back pain with right-sided sciatica    Left cervical radiculopathy    Carotid atherosclerosis    Uncomplicated alcohol dependence (HCC)    BMI 29.0-29.9,adult    Mild cognitive impairment    Diabetic mononeuropathy associated with type 2 diabetes mellitus (HCC)    Long term (current) use of oral hypoglycemic drugs    Dyslipidemia  "associated with type 2 diabetes mellitus (HCC)       Medications:   Current Outpatient Medications on File Prior to Visit   Medication Sig Dispense Refill    levothyroxine (SYNTHROID) 200 MCG Tab       metFORMIN ER (GLUCOPHAGE XR) 500 MG TABLET SR 24 HR Take 2 Tablets by mouth 2 times a day. 360 Tablet 2    Empagliflozin (JARDIANCE) 10 MG Tab Take 1 Tablet by mouth every day. 100 Tablet 3    pioglitazone (ACTOS) 30 MG Tab Take 1 Tablet by mouth every day. 100 Tablet 3    levothyroxine (SYNTHROID) 137 MCG Tab Take 1 Tablet by mouth every morning on an empty stomach. 90 Tablet 2    atorvastatin (LIPITOR) 40 MG Tab Take 1 Tablet by mouth every day. 90 Tablet 2    naproxen (NAPROSYN) 500 MG Tab Take 1 Tablet by mouth 2 times a day with meals. 30 Tablet 0    Semaglutide, 1 MG/DOSE, (OZEMPIC, 1 MG/DOSE,) 4 MG/3ML Solution Pen-injector Inject 1 mg under the skin every 7 days. 3 mL 11    cyanocobalamin (VITAMIN B12) 1000 MCG Tab Take 1 tablet by mouth every day. 30 tablet 11    FREESTYLE TEST STRIPS strip USE ONE NEW TEST STRIP EACH TIME TO TEST BLOOD SUGAR TWO TIMES A DAY AND AS NEEDED IF SYMPTOMS FOR HIGH OR LOW BLOOD SUGAR 100 Strip 0    Lancets Use one Freestyle lancet to test blood sugar twice daily. 100 Each 4    Blood Glucose Meter Kit Test blood sugar as recommended by provider. Freestyle blood glucose monitoring kit. 1 Kit 0    vitamin D (CHOLECALCIFEROL) 1000 UNIT Tab Take 3 Tabs by mouth every day. 60 Tab      No current facility-administered medications on file prior to visit.          Objective:     /66   Pulse 76   Temp 36.4 °C (97.5 °F) (Temporal)   Resp 16   Ht 1.651 m (5' 5\")   Wt 82.2 kg (181 lb 3.2 oz)   SpO2 98%   BMI 30.15 kg/m²     Physical Exam  Vitals reviewed.   Constitutional:       Appearance: Normal appearance. He is normal weight.   Cardiovascular:      Rate and Rhythm: Normal rate.      Pulses: Normal pulses.   Skin:     General: Skin is warm.      Capillary Refill: Capillary " refill takes less than 2 seconds.      Comments: Sutures removed from left hand x3. Tolerated well.    Neurological:      Mental Status: He is alert and oriented to person, place, and time.   Psychiatric:         Mood and Affect: Mood normal.         Behavior: Behavior normal.         Thought Content: Thought content normal.         Assessment /Associated Orders:      1. Visit for suture removal              Medical Decision Making:    Pt is clinically stable at today's acute urgent care visit.  No acute distress noted.   Sutures removed         Please note that this dictation was created using voice recognition software. I have worked with consultants from the vendor as well as technical experts from [facility] [employer] to optimize the interface. I have made every reasonable attempt to correct obvious errors, but I expect that there are errors of grammar and possibly content that I did not discover before finalizing the note.  This note was electronically signed by provider

## 2023-03-14 ENCOUNTER — APPOINTMENT (RX ONLY)
Dept: URBAN - METROPOLITAN AREA CLINIC 22 | Facility: CLINIC | Age: 68
Setting detail: DERMATOLOGY
End: 2023-03-14

## 2023-03-14 DIAGNOSIS — Z86.007 PERSONAL HISTORY OF IN-SITU NEOPLASM OF SKIN: ICD-10-CM

## 2023-03-14 DIAGNOSIS — Z71.89 OTHER SPECIFIED COUNSELING: ICD-10-CM

## 2023-03-14 DIAGNOSIS — L57.0 ACTINIC KERATOSIS: ICD-10-CM

## 2023-03-14 DIAGNOSIS — D18.0 HEMANGIOMA: ICD-10-CM

## 2023-03-14 DIAGNOSIS — L28.0 LICHEN SIMPLEX CHRONICUS: ICD-10-CM | Status: INADEQUATELY CONTROLLED

## 2023-03-14 DIAGNOSIS — L82.1 OTHER SEBORRHEIC KERATOSIS: ICD-10-CM

## 2023-03-14 DIAGNOSIS — L81.4 OTHER MELANIN HYPERPIGMENTATION: ICD-10-CM

## 2023-03-14 DIAGNOSIS — D22 MELANOCYTIC NEVI: ICD-10-CM

## 2023-03-14 DIAGNOSIS — Z85.828 PERSONAL HISTORY OF OTHER MALIGNANT NEOPLASM OF SKIN: ICD-10-CM

## 2023-03-14 PROBLEM — D22.5 MELANOCYTIC NEVI OF TRUNK: Status: ACTIVE | Noted: 2023-03-14

## 2023-03-14 PROBLEM — D48.5 NEOPLASM OF UNCERTAIN BEHAVIOR OF SKIN: Status: ACTIVE | Noted: 2023-03-14

## 2023-03-14 PROBLEM — D18.01 HEMANGIOMA OF SKIN AND SUBCUTANEOUS TISSUE: Status: ACTIVE | Noted: 2023-03-14

## 2023-03-14 PROCEDURE — ? SUNSCREEN RECOMMENDATIONS

## 2023-03-14 PROCEDURE — ? LIQUID NITROGEN

## 2023-03-14 PROCEDURE — 17003 DESTRUCT PREMALG LES 2-14: CPT

## 2023-03-14 PROCEDURE — 11102 TANGNTL BX SKIN SINGLE LES: CPT

## 2023-03-14 PROCEDURE — 99214 OFFICE O/P EST MOD 30 MIN: CPT | Mod: 25

## 2023-03-14 PROCEDURE — 17000 DESTRUCT PREMALG LESION: CPT | Mod: 59

## 2023-03-14 PROCEDURE — ? TREATMENT REGIMEN

## 2023-03-14 PROCEDURE — ? BIOPSY BY SHAVE METHOD

## 2023-03-14 PROCEDURE — ? COUNSELING

## 2023-03-14 PROCEDURE — ? PRESCRIPTION

## 2023-03-14 RX ORDER — TRIAMCINOLONE ACETONIDE 1 MG/G
CREAM TOPICAL BID
Qty: 30 | Refills: 1 | Status: ERX | COMMUNITY
Start: 2023-03-14

## 2023-03-14 RX ADMIN — TRIAMCINOLONE ACETONIDE: 1 CREAM TOPICAL at 00:00

## 2023-03-14 ASSESSMENT — LOCATION DETAILED DESCRIPTION DERM
LOCATION DETAILED: EPIGASTRIC SKIN
LOCATION DETAILED: LEFT SUPERIOR MEDIAL UPPER BACK
LOCATION DETAILED: LEFT ANTITRAGUS
LOCATION DETAILED: SUPERIOR LUMBAR SPINE
LOCATION DETAILED: LEFT NASAL DORSUM
LOCATION DETAILED: LEFT ACHILLES SKIN
LOCATION DETAILED: RIGHT INFERIOR TEMPLE
LOCATION DETAILED: RIGHT SUPERIOR MEDIAL MIDBACK
LOCATION DETAILED: RIGHT ANTERIOR PROXIMAL THIGH

## 2023-03-14 ASSESSMENT — LOCATION SIMPLE DESCRIPTION DERM
LOCATION SIMPLE: RIGHT TEMPLE
LOCATION SIMPLE: LEFT EAR
LOCATION SIMPLE: NOSE
LOCATION SIMPLE: LEFT ACHILLES SKIN
LOCATION SIMPLE: LEFT UPPER BACK
LOCATION SIMPLE: RIGHT THIGH
LOCATION SIMPLE: LOWER BACK
LOCATION SIMPLE: ABDOMEN
LOCATION SIMPLE: RIGHT LOWER BACK

## 2023-03-14 ASSESSMENT — LOCATION ZONE DERM
LOCATION ZONE: FACE
LOCATION ZONE: TRUNK
LOCATION ZONE: EAR
LOCATION ZONE: NOSE
LOCATION ZONE: LEG

## 2023-03-14 ASSESSMENT — SEVERITY ASSESSMENT: SEVERITY: MILD TO MODERATE

## 2023-03-14 NOTE — PROCEDURE: LIQUID NITROGEN
Show Applicator Variable?: Yes
Post-Care Instructions: I reviewed with the patient in detail post-care instructions. Patient is to wear sunprotection, and avoid picking at any of the treated lesions. Pt may apply Vaseline to crusted or scabbing areas.
Duration Of Freeze Thaw-Cycle (Seconds): 3
Detail Level: Detailed
Number Of Freeze-Thaw Cycles: 2 freeze-thaw cycles
Render Post-Care Instructions In Note?: no
Consent: The patient's consent was obtained including but not limited to risks of crusting, scabbing, blistering, scarring, darker or lighter pigmentary change, recurrence, incomplete removal and infection.

## 2023-03-15 ENCOUNTER — HOSPITAL ENCOUNTER (OUTPATIENT)
Dept: LAB | Facility: MEDICAL CENTER | Age: 68
End: 2023-03-15
Attending: INTERNAL MEDICINE
Payer: MEDICARE

## 2023-03-15 DIAGNOSIS — E11.9 CONTROLLED TYPE 2 DIABETES MELLITUS WITHOUT COMPLICATION, WITHOUT LONG-TERM CURRENT USE OF INSULIN (HCC): ICD-10-CM

## 2023-03-15 DIAGNOSIS — E03.9 HYPOTHYROIDISM (ACQUIRED): ICD-10-CM

## 2023-03-15 DIAGNOSIS — E78.5 DYSLIPIDEMIA: ICD-10-CM

## 2023-03-15 DIAGNOSIS — Z79.84 LONG TERM (CURRENT) USE OF ORAL HYPOGLYCEMIC DRUGS: ICD-10-CM

## 2023-03-15 DIAGNOSIS — E55.9 VITAMIN D DEFICIENCY: ICD-10-CM

## 2023-03-15 LAB
25(OH)D3 SERPL-MCNC: 69 NG/ML (ref 30–100)
ALBUMIN SERPL BCP-MCNC: 4.2 G/DL (ref 3.2–4.9)
ALBUMIN/GLOB SERPL: 1.8 G/DL
ALP SERPL-CCNC: 56 U/L (ref 30–99)
ALT SERPL-CCNC: 19 U/L (ref 2–50)
ANION GAP SERPL CALC-SCNC: 11 MMOL/L (ref 7–16)
AST SERPL-CCNC: 9 U/L (ref 12–45)
BILIRUB SERPL-MCNC: 0.5 MG/DL (ref 0.1–1.5)
BUN SERPL-MCNC: 15 MG/DL (ref 8–22)
CALCIUM ALBUM COR SERPL-MCNC: 9 MG/DL (ref 8.5–10.5)
CALCIUM SERPL-MCNC: 9.2 MG/DL (ref 8.5–10.5)
CHLORIDE SERPL-SCNC: 104 MMOL/L (ref 96–112)
CHOLEST SERPL-MCNC: 138 MG/DL (ref 100–199)
CO2 SERPL-SCNC: 25 MMOL/L (ref 20–33)
CREAT SERPL-MCNC: 0.87 MG/DL (ref 0.5–1.4)
CREAT UR-MCNC: 78.71 MG/DL
FASTING STATUS PATIENT QL REPORTED: NORMAL
GFR SERPLBLD CREATININE-BSD FMLA CKD-EPI: 93 ML/MIN/1.73 M 2
GLOBULIN SER CALC-MCNC: 2.4 G/DL (ref 1.9–3.5)
GLUCOSE SERPL-MCNC: 135 MG/DL (ref 65–99)
HDLC SERPL-MCNC: 45 MG/DL
LDLC SERPL CALC-MCNC: 69 MG/DL
MICROALBUMIN UR-MCNC: <1.2 MG/DL
MICROALBUMIN/CREAT UR: NORMAL MG/G (ref 0–30)
POTASSIUM SERPL-SCNC: 4.2 MMOL/L (ref 3.6–5.5)
PROT SERPL-MCNC: 6.6 G/DL (ref 6–8.2)
SODIUM SERPL-SCNC: 140 MMOL/L (ref 135–145)
T4 FREE SERPL-MCNC: 1.67 NG/DL (ref 0.93–1.7)
TRIGL SERPL-MCNC: 122 MG/DL (ref 0–149)
TSH SERPL DL<=0.005 MIU/L-ACNC: 0.44 UIU/ML (ref 0.38–5.33)

## 2023-03-15 PROCEDURE — 82570 ASSAY OF URINE CREATININE: CPT

## 2023-03-15 PROCEDURE — 80053 COMPREHEN METABOLIC PANEL: CPT

## 2023-03-15 PROCEDURE — 82043 UR ALBUMIN QUANTITATIVE: CPT

## 2023-03-15 PROCEDURE — 84439 ASSAY OF FREE THYROXINE: CPT

## 2023-03-15 PROCEDURE — 80061 LIPID PANEL: CPT

## 2023-03-15 PROCEDURE — 84443 ASSAY THYROID STIM HORMONE: CPT

## 2023-03-15 PROCEDURE — 82306 VITAMIN D 25 HYDROXY: CPT

## 2023-03-15 PROCEDURE — 36415 COLL VENOUS BLD VENIPUNCTURE: CPT

## 2023-03-21 ENCOUNTER — OFFICE VISIT (OUTPATIENT)
Dept: ENDOCRINOLOGY | Facility: MEDICAL CENTER | Age: 68
End: 2023-03-21
Attending: INTERNAL MEDICINE
Payer: MEDICARE

## 2023-03-21 VITALS
WEIGHT: 181 LBS | HEART RATE: 80 BPM | DIASTOLIC BLOOD PRESSURE: 62 MMHG | SYSTOLIC BLOOD PRESSURE: 104 MMHG | OXYGEN SATURATION: 95 % | HEIGHT: 65 IN | BODY MASS INDEX: 30.16 KG/M2

## 2023-03-21 DIAGNOSIS — E11.9 CONTROLLED TYPE 2 DIABETES MELLITUS WITHOUT COMPLICATION, WITHOUT LONG-TERM CURRENT USE OF INSULIN (HCC): ICD-10-CM

## 2023-03-21 DIAGNOSIS — E78.5 DYSLIPIDEMIA: ICD-10-CM

## 2023-03-21 DIAGNOSIS — Z79.84 LONG TERM (CURRENT) USE OF ORAL HYPOGLYCEMIC DRUGS: ICD-10-CM

## 2023-03-21 DIAGNOSIS — E03.9 HYPOTHYROIDISM (ACQUIRED): ICD-10-CM

## 2023-03-21 DIAGNOSIS — E55.9 VITAMIN D DEFICIENCY: ICD-10-CM

## 2023-03-21 DIAGNOSIS — Z59.89 ON ECONOMIC ASSISTANCE PROGRAM: ICD-10-CM

## 2023-03-21 LAB
HBA1C MFR BLD: 6.6 % (ref ?–5.8)
POCT INT CON NEG: NEGATIVE
POCT INT CON POS: POSITIVE

## 2023-03-21 PROCEDURE — 99214 OFFICE O/P EST MOD 30 MIN: CPT | Performed by: INTERNAL MEDICINE

## 2023-03-21 PROCEDURE — 99212 OFFICE O/P EST SF 10 MIN: CPT | Performed by: INTERNAL MEDICINE

## 2023-03-21 PROCEDURE — 83036 HEMOGLOBIN GLYCOSYLATED A1C: CPT | Performed by: INTERNAL MEDICINE

## 2023-03-21 SDOH — ECONOMIC STABILITY - INCOME SECURITY: OTHER PROBLEMS RELATED TO HOUSING AND ECONOMIC CIRCUMSTANCES: Z59.89

## 2023-03-21 NOTE — PROGRESS NOTES
CHIEF COMPLAINT: Patient is here for follow up of Type 2 Diabetes Mellitus    HPI:     Kristian Frank is a 67 y.o. male with Type 2 Diabetes Mellitus here for follow up.    Labs from 3/2/1/2023 hba1c is 6.4%  Labs from 10/18/2022 HbA1c is 6.7%    He was previously seen by the nurse practitioner   Comorbid issues include hypothyroidism, hyperlipidemia  He denies a history of coronary artery disease and cerebrovascular disease        He is on  Metformin extended release 1000 mg twice a day  Actos 30 mg daily  Jardiance 10 mg daily  Ozempic 1 mg weekly  (PAP)      He denies SE with his medications  He was denied patient assistance for Jardiance       He has hyperlipidemia  He is taking Atorvastatin 40 mg daily  I discussed replacing this with a better statin  His LDL cholesterol was 69 on 3/2023        He doesn't have diabetic kidney disease  U ACR was less than 30 on 3/2023      He ahd an eye exam on 10/18/2022      He has hypothyroidism and is taking Levothyroxine 137mcg daily 5 days a week   He denies palpitations and tremors   His TSH is 0.440 on 3/2023        BG Diary:  Patient did not bring his glucose meter    Weight has been stable    Diabetes Complications   Retinopathy: No known retinopathy.  Last eye exam: 10/18/2022  Neuropathy: Denies paresthesias or numbness in hands or feet. Denies any foot wounds.  Exercise: Minimal.  Diet: Fair.  Patient's medications, allergies, and social histories were reviewed and updated as appropriate.    ROS:     CONS:     No fever, no chills   EYES:     No diplopia, no blurry vision   CV:           No chest pain, no palpitations   PULM:     No SOB, no cough, no hemoptysis.   GI:            No nausea, no vomiting, no diarrhea, no constipation   ENDO:     No polyuria, no polydipsia, no heat intolerance, no cold intolerance       Past Medical History:  Problem List:  2023-02: Dyslipidemia associated with type 2 diabetes mellitus (HCC)  2022-10: Long term (current) use of oral  hypoglycemic drugs  2022-03: BMI 29.0-29.9,adult  2022-03: Mild cognitive impairment  2022-03: Diabetic mononeuropathy associated with type 2 diabetes   mellitus ([facility])  2020-05: Obesity (BMI 30-39.9)  2019-05: Uncomplicated alcohol dependence ([facility])  2019-05: Essential hypertension  2018-06: Obesity (BMI 30-39.9)  2018-03: Carotid atherosclerosis  2018-01: Left cervical radiculopathy  2018-01: Left hand weakness  2016-04: Allergic rhinitis  2016-04: Post-nasal drip  2016-04: Chronic bilateral low back pain with right-sided sciatica  2016-02: Abnormal EKG  2016-02: Cigarette nicotine dependence without complication  2016-02: Obesity (BMI 30.0-34.9)  2016-02: Epigastric abdominal pain  2016-02: Sleep apnea  2016-02: Tenosynovitis of wrist  2013-10: Type 2 diabetes mellitus with hemoglobin A1c goal of less   than 7.0% ([facility])  2012-12: Vitamin D deficiency disease  Dyslipidemia  DIABETES MELLITUS  Hypothyroidism (acquired)  Diverticulitis  Controlled type 2 diabetes mellitus with diabetic mononeuropathy,   without long-term current use of insulin ([facility])      Past Surgical History:  Past Surgical History:   Procedure Laterality Date    APPENDECTOMY      COLON RESECTION          Allergies:  Patient has no known allergies.     Social History:  Social History     Tobacco Use    Smoking status: Every Day     Packs/day: 0.50     Years: 28.00     Pack years: 14.00     Types: Cigarettes    Smokeless tobacco: Never   Vaping Use    Vaping Use: Never used   Substance Use Topics    Alcohol use: Yes     Alcohol/week: 8.4 - 12.6 oz     Types: 14 - 21 Cans of beer per week     Comment: 2-3 a day    Drug use: Yes     Frequency: 4.0 times per week     Types: Marijuana, Inhaled        Family History:   family history includes Cancer in his sister; Diabetes in his brother, mother, sister, and sister; Heart Attack in his father; Heart Disease in his father; Hyperlipidemia in his brother, father, mother, and sister; Hypertension in his  "brother, father, mother, and sister; Lung Disease in his sister.      PHYSICAL EXAM:   OBJECTIVE:  Vital signs: /62 (BP Location: Left arm, Patient Position: Sitting)   Pulse 80   Ht 1.651 m (5' 5\")   Wt 82.1 kg (181 lb)   SpO2 95%   BMI 30.12 kg/m²   GENERAL: Well-developed, well-nourished in no apparent distress.   EYE:  No ocular asymmetry, PERRLA  HENT: Pink, moist mucous membranes.    NECK: No thyromegaly.   CARDIOVASCULAR:  No murmurs  LUNGS: Clear breath sounds  ABDOMEN: Soft, nontender   EXTREMITIES: No clubbing, cyanosis, or edema.   NEUROLOGICAL: No gross focal motor abnormalities   LYMPH: No cervical adenopathy palpated.   SKIN: No rashes, lesions.     Labs:  Lab Results   Component Value Date/Time    HBA1C 6.6 (A) 03/21/2023 09:08 AM        Lab Results   Component Value Date/Time    WBC 7.5 06/19/2020 07:40 AM    RBC 5.14 06/19/2020 07:40 AM    HEMOGLOBIN 16.1 06/19/2020 07:40 AM    MCV 94.6 06/19/2020 07:40 AM    MCH 31.3 06/19/2020 07:40 AM    MCHC 33.1 (L) 06/19/2020 07:40 AM    RDW 50.2 (H) 06/19/2020 07:40 AM    MPV 11.4 06/19/2020 07:40 AM       Lab Results   Component Value Date/Time    SODIUM 140 03/15/2023 08:32 AM    POTASSIUM 4.2 03/15/2023 08:32 AM    CHLORIDE 104 03/15/2023 08:32 AM    CO2 25 03/15/2023 08:32 AM    ANION 11.0 03/15/2023 08:32 AM    GLUCOSE 135 (H) 03/15/2023 08:32 AM    BUN 15 03/15/2023 08:32 AM    CREATININE 0.87 03/15/2023 08:32 AM    CREATININE 0.93 03/22/2013 07:21 AM    CALCIUM 9.2 03/15/2023 08:32 AM    ASTSGOT 9 (L) 03/15/2023 08:32 AM    ALTSGPT 19 03/15/2023 08:32 AM    TBILIRUBIN 0.5 03/15/2023 08:32 AM    ALBUMIN 4.2 03/15/2023 08:32 AM    TOTPROTEIN 6.6 03/15/2023 08:32 AM    GLOBULIN 2.4 03/15/2023 08:32 AM    AGRATIO 1.8 03/15/2023 08:32 AM       Lab Results   Component Value Date/Time    CHOLSTRLTOT 161 02/16/2022 0737    TRIGLYCERIDE 128 02/16/2022 0737    HDL 50 02/16/2022 0737    LDL 85 02/16/2022 0737       Lab Results   Component Value " Date/Time    MICROALBCALC 5.9 10/16/2013 07:12 AM    MALBCRT see below 03/15/2023 08:31 AM    MICROALBUR <1.2 03/15/2023 08:31 AM    MICRALB 7.4 10/16/2013 07:12 AM        Lab Results   Component Value Date/Time    TSHULTRASEN 0.120 (L) 02/16/2022 0737     No results found for: FREEDIR  Lab Results   Component Value Date/Time    FREET3 3.12 02/16/2022 0737     No results found for: THYSTIMIG        ASSESSMENT/PLAN:     1. Controlled type 2 diabetes mellitus without complication, without long-term current use of insulin (HCC)  Controlled  Continue Ozempic 1 mg once a week   Continue metformin max dose  Continue Actos max dose  Continue Jardiance 10 mg daily  I gave him 4 samples of Jardiance bottles today 10 mg  He is up-to-date with his labs  Follow-up with Mellisa in 6 months with repeat A1c      2. Hypothyroidism (acquired)  Controlled  Continue  levothyroxine 137 MCG  5 days out of 7   Repeat thyroid labs in 6 months    3. Dyslipidemia  Controlled  Continue atorvastatin  Repeat fasting lipids in 6 to 12 months    4. Vitamin D deficiency  Stable   Vitamin D labs were reviewed with patient  Continue current supplements  Continue monitoring levels       5. Long term (current) use of oral hypoglycemic drugs  Patient is on multiple oral agents and a GLP-1 for type 2 diabetes management      Return in about 6 months (around 9/21/2023).      Thank you kindly for allowing me to participate in the diabetes care plan for this patient.    Kristian Jones MD, FACE, [facility]      CC:   LEON Hernandez

## 2023-07-05 ENCOUNTER — OFFICE VISIT (OUTPATIENT)
Dept: URGENT CARE | Facility: PHYSICIAN GROUP | Age: 68
End: 2023-07-05
Payer: MEDICARE

## 2023-07-05 VITALS
RESPIRATION RATE: 16 BRPM | DIASTOLIC BLOOD PRESSURE: 70 MMHG | HEART RATE: 72 BPM | OXYGEN SATURATION: 97 % | TEMPERATURE: 97.3 F | SYSTOLIC BLOOD PRESSURE: 118 MMHG | BODY MASS INDEX: 28.12 KG/M2 | WEIGHT: 175 LBS | HEIGHT: 66 IN

## 2023-07-05 DIAGNOSIS — S05.02XA ABRASION OF LEFT CORNEA, INITIAL ENCOUNTER: ICD-10-CM

## 2023-07-05 PROCEDURE — 3074F SYST BP LT 130 MM HG: CPT | Performed by: FAMILY MEDICINE

## 2023-07-05 PROCEDURE — 99213 OFFICE O/P EST LOW 20 MIN: CPT | Performed by: FAMILY MEDICINE

## 2023-07-05 PROCEDURE — 3078F DIAST BP <80 MM HG: CPT | Performed by: FAMILY MEDICINE

## 2023-07-05 RX ORDER — TOBRAMYCIN 3 MG/ML
1 SOLUTION/ DROPS OPHTHALMIC 4 TIMES DAILY
Qty: 5 ML | Refills: 0 | Status: SHIPPED | OUTPATIENT
Start: 2023-07-05 | End: 2023-07-10

## 2023-07-05 ASSESSMENT — ENCOUNTER SYMPTOMS: EYE PAIN: 1

## 2023-07-05 NOTE — PROGRESS NOTES
Subjective     Kristian Frank is a 68 y.o. male who presents with Eye Injury (X 1 day on (L) eye, pt states he got poked in the eye ball with pine needles )      - This is a pleasant and nontoxic appearing 68 y.o. person who has come to the walk-in clinic today for:    #1) doing yard work yesterday and accidentally poked Lt eye with pine needle. Feels irritated today, no vision change or light sensitivity. Last tetanus was this year. Has f/u appt w/ eye doctor tomorrow.       ALLERGIES:  Patient has no known allergies.     PMH:  Past Medical History:   Diagnosis Date    Back pain     ddd    DIABETES MELLITUS     Diverticulitis     Dyslipidemia     GOUT     Hyperlipidemia     Hypothyroid     Pneumonia     Post-nasal drip     Rhinitis         PSH:  Past Surgical History:   Procedure Laterality Date    APPENDECTOMY      COLON RESECTION         MEDS:    Current Outpatient Medications:     tobramycin (TOBREX) 0.3 % Solution ophthalmic solution, Administer 1 Drop into the left eye 4 times a day for 5 days., Disp: 5 mL, Rfl: 0    metFORMIN ER (GLUCOPHAGE XR) 500 MG TABLET SR 24 HR, Take 2 Tablets by mouth 2 times a day., Disp: 360 Tablet, Rfl: 2    Empagliflozin (JARDIANCE) 10 MG Tab, Take 1 Tablet by mouth every day., Disp: 100 Tablet, Rfl: 3    pioglitazone (ACTOS) 30 MG Tab, Take 1 Tablet by mouth every day., Disp: 100 Tablet, Rfl: 3    levothyroxine (SYNTHROID) 137 MCG Tab, Take 1 Tablet by mouth every morning on an empty stomach., Disp: 90 Tablet, Rfl: 2    atorvastatin (LIPITOR) 40 MG Tab, Take 1 Tablet by mouth every day., Disp: 90 Tablet, Rfl: 2    naproxen (NAPROSYN) 500 MG Tab, Take 1 Tablet by mouth 2 times a day with meals., Disp: 30 Tablet, Rfl: 0    Semaglutide, 1 MG/DOSE, (OZEMPIC, 1 MG/DOSE,) 4 MG/3ML Solution Pen-injector, Inject 1 mg under the skin every 7 days., Disp: 3 mL, Rfl: 11    cyanocobalamin (VITAMIN B12) 1000 MCG Tab, Take 1 tablet by mouth every day., Disp: 30 tablet, Rfl: 11    vitamin D  "(CHOLECALCIFEROL) 1000 UNIT Tab, Take 3 Tabs by mouth every day., Disp: 60 Tab, Rfl:     FREESTYLE TEST STRIPS strip, USE ONE NEW TEST STRIP EACH TIME TO TEST BLOOD SUGAR TWO TIMES A DAY AND AS NEEDED IF SYMPTOMS FOR HIGH OR LOW BLOOD SUGAR, Disp: 100 Strip, Rfl: 0    Lancets, Use one Freestyle lancet to test blood sugar twice daily., Disp: 100 Each, Rfl: 4    Blood Glucose Meter Kit, Test blood sugar as recommended by provider. Freestyle blood glucose monitoring kit., Disp: 1 Kit, Rfl: 0    ** I have documented what I find to be significant in regards to past medical, social, family and surgical history  in my HPI or under PMH/PSH/FH review section, otherwise it is noncontributory **         HPI    Review of Systems   Eyes:  Positive for pain.   All other systems reviewed and are negative.             Objective     /70 (BP Location: Left arm, Patient Position: Sitting, BP Cuff Size: Adult long)   Pulse 72   Temp 36.3 °C (97.3 °F) (Temporal)   Resp 16   Ht 1.676 m (5' 6\")   Wt 79.4 kg (175 lb)   SpO2 97%   BMI 28.25 kg/m²      Physical Exam  Vitals and nursing note reviewed.   Constitutional:       General: He is not in acute distress.     Appearance: Normal appearance. He is well-developed.   HENT:      Head: Normocephalic.   Eyes:        Comments: Lt eye: + abrasion. Otherwise unremarkable    Pulmonary:      Effort: Pulmonary effort is normal. No respiratory distress.   Neurological:      Mental Status: He is alert.      Motor: No abnormal muscle tone.   Psychiatric:         Mood and Affect: Mood normal.         Behavior: Behavior normal.                             Assessment & Plan     1. Abrasion of left cornea, initial encounter  tobramycin (TOBREX) 0.3 % Solution ophthalmic solution          - Dx, plan & d/c instructions discussed   - OTC Motrin and/or Tylenol as needed  - E.R. precautions discussed     Follow up with your eye doctors office for a recheck on today's visit. ER if not improving in " 2 days or if feeling/getting worse.    Patient left in stable condition     Pertinent prior office visit notes in Epic have been reviewed by me today on day of this visit.

## 2023-07-06 DIAGNOSIS — E78.5 DYSLIPIDEMIA: ICD-10-CM

## 2023-07-06 RX ORDER — ATORVASTATIN CALCIUM 40 MG/1
TABLET, FILM COATED ORAL
Qty: 100 TABLET | Refills: 2 | Status: SHIPPED | OUTPATIENT
Start: 2023-07-06

## 2023-07-14 ENCOUNTER — OFFICE VISIT (OUTPATIENT)
Dept: MEDICAL GROUP | Facility: MEDICAL CENTER | Age: 68
End: 2023-07-14
Payer: MEDICARE

## 2023-07-14 VITALS
TEMPERATURE: 97.8 F | OXYGEN SATURATION: 96 % | BODY MASS INDEX: 28.49 KG/M2 | WEIGHT: 171 LBS | SYSTOLIC BLOOD PRESSURE: 98 MMHG | DIASTOLIC BLOOD PRESSURE: 56 MMHG | HEART RATE: 92 BPM | HEIGHT: 65 IN

## 2023-07-14 DIAGNOSIS — F17.210 CIGARETTE NICOTINE DEPENDENCE WITHOUT COMPLICATION: Chronic | ICD-10-CM

## 2023-07-14 DIAGNOSIS — G47.30 SLEEP APNEA, UNSPECIFIED TYPE: Chronic | ICD-10-CM

## 2023-07-14 DIAGNOSIS — M25.511 CHRONIC RIGHT SHOULDER PAIN: ICD-10-CM

## 2023-07-14 DIAGNOSIS — E78.5 DYSLIPIDEMIA ASSOCIATED WITH TYPE 2 DIABETES MELLITUS (HCC): ICD-10-CM

## 2023-07-14 DIAGNOSIS — F10.20 UNCOMPLICATED ALCOHOL DEPENDENCE (HCC): ICD-10-CM

## 2023-07-14 DIAGNOSIS — E03.9 HYPOTHYROIDISM (ACQUIRED): ICD-10-CM

## 2023-07-14 DIAGNOSIS — E55.9 VITAMIN D DEFICIENCY: Chronic | ICD-10-CM

## 2023-07-14 DIAGNOSIS — I65.23 ATHEROSCLEROSIS OF BOTH CAROTID ARTERIES: ICD-10-CM

## 2023-07-14 DIAGNOSIS — G89.29 CHRONIC RIGHT SHOULDER PAIN: ICD-10-CM

## 2023-07-14 DIAGNOSIS — E11.41 TYPE 2 DIABETES MELLITUS WITH DIABETIC MONONEUROPATHY, WITHOUT LONG-TERM CURRENT USE OF INSULIN (HCC): ICD-10-CM

## 2023-07-14 DIAGNOSIS — Z00.00 MEDICARE ANNUAL WELLNESS VISIT, SUBSEQUENT: Primary | ICD-10-CM

## 2023-07-14 DIAGNOSIS — E11.69 DYSLIPIDEMIA ASSOCIATED WITH TYPE 2 DIABETES MELLITUS (HCC): ICD-10-CM

## 2023-07-14 PROBLEM — G31.84 MILD COGNITIVE IMPAIRMENT: Status: RESOLVED | Noted: 2022-03-08 | Resolved: 2023-07-14

## 2023-07-14 PROBLEM — Z79.84 LONG TERM (CURRENT) USE OF ORAL HYPOGLYCEMIC DRUGS: Chronic | Status: ACTIVE | Noted: 2022-10-18

## 2023-07-14 PROBLEM — I65.29 CAROTID ATHEROSCLEROSIS: Status: RESOLVED | Noted: 2018-03-27 | Resolved: 2023-07-14

## 2023-07-14 PROCEDURE — 3078F DIAST BP <80 MM HG: CPT | Performed by: NURSE PRACTITIONER

## 2023-07-14 PROCEDURE — 3074F SYST BP LT 130 MM HG: CPT | Performed by: NURSE PRACTITIONER

## 2023-07-14 PROCEDURE — G0439 PPPS, SUBSEQ VISIT: HCPCS | Performed by: NURSE PRACTITIONER

## 2023-07-14 ASSESSMENT — ACTIVITIES OF DAILY LIVING (ADL): BATHING_REQUIRES_ASSISTANCE: 0

## 2023-07-14 ASSESSMENT — PATIENT HEALTH QUESTIONNAIRE - PHQ9: CLINICAL INTERPRETATION OF PHQ2 SCORE: 0

## 2023-07-14 ASSESSMENT — ENCOUNTER SYMPTOMS: GENERAL WELL-BEING: EXCELLENT

## 2023-07-14 NOTE — PROGRESS NOTES
Chief Complaint   Patient presents with    Annual Wellness Visit       HPI:  Kristian ALDRIDGE is a 68 y.o. here for Medicare Annual Wellness Visit    Chronic right shoulder pain.   Patient reports having longstanding chronic right shoulder pain  Reportedly had a separation back in 1974.  History of cortisone injection which was helpful.  Would like to have consult with orthopedics again for possible treatment options.      Patient Active Problem List    Diagnosis Date Noted    Atherosclerosis of both carotid arteries 07/14/2023    On economic assistance program 03/21/2023    Dyslipidemia associated with type 2 diabetes mellitus (HCC) 02/09/2023    Long term (current) use of oral hypoglycemic drugs 10/18/2022    Diabetic mononeuropathy associated with type 2 diabetes mellitus (HCC) 03/08/2022    Uncomplicated alcohol dependence (HCC) 05/08/2019    Left cervical radiculopathy 01/29/2018    Allergic rhinitis 04/18/2016    Chronic bilateral low back pain with right-sided sciatica 04/18/2016    Abnormal EKG 02/11/2016    Cigarette nicotine dependence without complication 02/11/2016    Sleep apnea 02/11/2016    Tenosynovitis of wrist 02/11/2016    Vitamin D deficiency disease 12/04/2012    Hypothyroidism (acquired)        Current Outpatient Medications   Medication Sig Dispense Refill    atorvastatin (LIPITOR) 40 MG Tab TAKE ONE TABLET BY MOUTH DAILY 100 Tablet 2    metFORMIN ER (GLUCOPHAGE XR) 500 MG TABLET SR 24 HR Take 2 Tablets by mouth 2 times a day. 360 Tablet 2    Empagliflozin (JARDIANCE) 10 MG Tab Take 1 Tablet by mouth every day. 100 Tablet 3    pioglitazone (ACTOS) 30 MG Tab Take 1 Tablet by mouth every day. 100 Tablet 3    levothyroxine (SYNTHROID) 137 MCG Tab Take 1 Tablet by mouth every morning on an empty stomach. 90 Tablet 2    Semaglutide, 1 MG/DOSE, (OZEMPIC, 1 MG/DOSE,) 4 MG/3ML Solution Pen-injector Inject 1 mg under the skin every 7 days. 3 mL 11    cyanocobalamin (VITAMIN B12) 1000 MCG Tab Take 1 tablet by  mouth every day. 30 tablet 11    FREESTYLE TEST STRIPS strip USE ONE NEW TEST STRIP EACH TIME TO TEST BLOOD SUGAR TWO TIMES A DAY AND AS NEEDED IF SYMPTOMS FOR HIGH OR LOW BLOOD SUGAR 100 Strip 0    Lancets Use one Freestyle lancet to test blood sugar twice daily. 100 Each 4    Blood Glucose Meter Kit Test blood sugar as recommended by provider. Freestyle blood glucose monitoring kit. 1 Kit 0    vitamin D (CHOLECALCIFEROL) 1000 UNIT Tab Take 3 Tabs by mouth every day. 60 Tab      No current facility-administered medications for this visit.        Patient is taking medications as noted in medication list.  Current supplements as per medication list.     Allergies: Patient has no known allergies.    Current social contact/activities: gold prospecting     Is patient current with immunizations? Yes.    He  reports that he has been smoking cigarettes. He has a 14.00 pack-year smoking history. He has never used smokeless tobacco. He reports current alcohol use of about 8.4 - 12.6 oz of alcohol per week. He reports current drug use. Frequency: 4.00 times per week. Drugs: Marijuana and Inhaled.  Ready to quit: Not Answered  Counseling given: Yes      ROS:    Gait: Uses no assistive device   Ostomy: No   Other tubes: No   Amputations: No   Chronic oxygen use No   Last eye exam 07/2023   Wears hearing aids: No   : Denies any urinary leakage during the last 6 months      Screening:    Annual Health Assessment Questions:    1.  Are you currently engaging in any exercise or physical activity? Yes    2.  How would you describe your mood or emotional well-being today? good    3.  Have you had any falls in the last year? No    4.  Have you noticed any problems with your balance or had difficulty walking? No    5.  In the last six months have you experienced any leakage of urine? No    6. DPA/Advanced Directive: Patient has Durable Power of  on file.     Depression Screening  Little interest or pleasure in doing things?  0  - not at all  Feeling down, depressed, or hopeless? 0 - not at all  Patient Health Questionnaire Score: 0    If depressive symptoms identified deferred to follow up visit unless specifically addressed in assessment and plan.    Interpretation of PHQ-9 Total Score   Score Severity   1-4 No Depression   5-9 Mild Depression   10-14 Moderate Depression   15-19 Moderately Severe Depression   20-27 Severe Depression    Screening for Cognitive Impairment  Three Minute Recall (daughter, heaven, mountain)  3/3    Finn clock face with all 12 numbers and set the hands to show 10 past 11.  Yes    If cognitive concerns identified, deferred for follow up unless specifically addressed in assessment and plan.    Fall Risk Assessment  Has the patient had two or more falls in the last year or any fall with injury in the last year?  No  If fall risk identified, deferred for follow up unless specifically addressed in assessment and plan.    Safety Assessment  Throw rugs on floor.  No  Handrails on all stairs.  Yes  Good lighting in all hallways.  Yes  Difficulty hearing.  No  Patient counseled about all safety risks that were identified.    Functional Assessment ADLs  Are there any barriers preventing you from cooking for yourself or meeting nutritional needs?  No.    Are there any barriers preventing you from driving safely or obtaining transportation?  No.    Are there any barriers preventing you from using a telephone or calling for help?  No.    Are there any barriers preventing you from shopping?  No.    Are there any barriers preventing you from taking care of your own finances?  No.    Are there any barriers preventing you from managing your medications?  No.    Are there any barriers preventing you from showering, bathing or dressing yourself?  No.    Are you currently engaging in any exercise or physical activity?  Yes.     What is your perception of your health?  Excellent.    Advance Care Planning  Do you have an Advance  Directive, Living Will, Durable Power of , or POLST? Yes      Durable Power of    filed in EHR    Health Maintenance Summary            Overdue - COVID-19 Vaccine (2 - Pfizer series) Overdue since 4/27/2023 12/27/2022  Imm Admin: MODERNA BIVALENT BOOSTER SARS-COV-2 VACCINE (6+)    04/29/2022  Imm Admin: MODERNA SARS-COV-2 VACCINE (12+)    11/23/2021  Imm Admin: MODERNA SARS-COV-2 VACCINE (12+)    04/10/2021  Imm Admin: MODERNA SARS-COV-2 VACCINE (12+)    03/12/2021  Imm Admin: MODERNA SARS-COV-2 VACCINE (12+)              COLORECTAL CANCER SCREENING (COLONOSCOPY - Every 3 Years) Due soon on 8/12/2023 08/12/2020  REFERRAL TO GI FOR COLONOSCOPY    08/05/2015  AMB REFERRAL TO GI FOR COLONOSCOPY              IMM INFLUENZA (1) Next due on 9/1/2023 12/27/2022  Imm Admin: Influenza, Unspecified - HISTORICAL DATA    10/25/2021  Imm Admin: Influenza Vaccine Adult HD    11/20/2020  Imm Admin: Influenza Vaccine Adult HD    10/01/2019  Imm Admin: Influenza Vaccine Adult HD    09/12/2018  Imm Admin: Influenza Vaccine Quad Inj (Preserved)    Only the first 5 history entries have been loaded, but more history exists.              A1C SCREENING (Every 6 Months) Next due on 9/21/2023 03/21/2023  POCT Hemoglobin A1C    10/18/2022  POCT Hemoglobin A1C    02/23/2022  POCT Hemoglobin A1C    10/11/2021  POCT Hemoglobin A1C    06/14/2021  POCT Hemoglobin A1C    Only the first 5 history entries have been loaded, but more history exists.              DIABETES MONOFILAMENT / LE EXAM (Yearly) Next due on 10/18/2023      10/18/2022  Diabetic Monofilament LE Exam    10/11/2021  Diabetic Monofilament LE Exam    06/14/2021  Diabetic Monofilament LE Exam    06/16/2020  Diabetic Monofilament LE Exam    01/22/2019  Diabetic Monofilament Lower Extremity Exam    Only the first 5 history entries have been loaded, but more history exists.              RETINAL SCREENING (Yearly) Next due on 10/25/2023      10/25/2022   RETINAL SCREENING RESULTS    10/18/2022  POCT Retinal Eye Exam    02/18/2021  Done    02/18/2020  REFERRAL FOR RETINAL SCREENING EXAM    02/15/2019  REFERRAL FOR RETINAL SCREENING EXAM    Only the first 5 history entries have been loaded, but more history exists.              FASTING LIPID PROFILE (Yearly) Next due on 3/15/2024      03/15/2023  Lipid Profile    02/16/2022  Lipid Profile    02/03/2021  Lipid Profile    11/04/2020  Lipid Profile    06/19/2020  Lipid Profile    Only the first 5 history entries have been loaded, but more history exists.              URINE ACR / MICROALBUMIN (Yearly) Next due on 3/15/2024      03/15/2023  MICROALBUMIN CREAT RATIO URINE    02/16/2022  MICROALBUMIN CREAT RATIO URINE    06/10/2021  MICROALBUMIN CREAT RATIO URINE    06/19/2020  MICROALBUMIN CREAT RATIO URINE    06/19/2020  MICROALBUMIN CREAT RATIO URINE (LAB COLLECT)    Only the first 5 history entries have been loaded, but more history exists.              Ordered - SERUM CREATININE (Yearly) Ordered on 3/21/2023      03/15/2023  Comp Metabolic Panel    02/16/2022  Comp Metabolic Panel    02/03/2021  Comp Metabolic Panel    06/19/2020  Basic Metabolic Panel    06/19/2020  Comp Metabolic Panel    Only the first 5 history entries have been loaded, but more history exists.              Annual Wellness Visit (Every 366 Days) Next due on 7/14/2024 07/14/2023  Visit Dx: Medicare annual wellness visit, subsequent    07/14/2023  Level of Service: ANNUAL WELLNESS VISIT-INCLUDES PPPS SUBSEQUE*    07/14/2023  Subsequent Annual Wellness Visit - Includes PPPS ()    02/09/2023  Level of Service: ANNUAL WELLNESS VISIT-INCLUDES PPPS SUBSEQUE*    09/11/2022  Subsequent Annual Wellness Visit - Includes PPPS ()    Only the first 5 history entries have been loaded, but more history exists.              IMM DTaP/Tdap/Td Vaccine (3 - Td or Tdap) Next due on 2/1/2033 02/01/2023  Imm Admin: Tdap Vaccine    11/08/2013  Imm Admin:  Tdap Vaccine              IMM ZOSTER VACCINES (Series Information) Completed      07/01/2019  Imm Admin: Zoster Vaccine Recombinant (RZV) (SHINGRIX)    02/13/2019  Imm Admin: Zoster Vaccine Recombinant (RZV) (SHINGRIX)              HEPATITIS C SCREENING  Completed      06/19/2020  Hepatitis C Antibody component of HEP C VIRUS ANTIBODY              IMM PNEUMOCOCCAL VACCINE: 65+ Years (Series Information) Completed      07/28/2022  Imm Admin: Pneumococcal Conjugate Vaccine (PCV20)    05/06/2019  Imm Admin: Pneumococcal Conjugate Vaccine (Prevnar/PCV-13)    10/17/2017  Imm Admin: Pneumococcal polysaccharide vaccine (PPSV-23)    07/18/2016  Imm Admin: Pneumococcal polysaccharide vaccine (PPSV-23)              ABDOMINAL AORTIC ANEURYSM (AAA) SCREEN  Completed      08/30/2022  US-ABDOMINAL AORTA W/O DOPPLER              HPV Vaccines (Series Information) Aged Out      No completion history exists for this topic.              IMM MENINGOCOCCAL ACWY VACCINE (Series Information) Aged Out      No completion history exists for this topic.              Discontinued - PSA SCREENING  Discontinued        Frequency changed to Never automatically (Topic No Longer Applies)    06/19/2020  PROSTATE SPECIFIC AG SCREENING    05/10/2019  PROSTATE SPECIFIC AG SCREENING    06/26/2018  PROSTATE SPECIFIC AG SCREENING              Discontinued - IMM HEP B VACCINE  Discontinued      02/13/2019  Imm Admin: Hep A/HEP B Combined Vaccine (TwinRix)                    Patient Care Team:  CELINE HernandezP.RYOSHI as PCP - General (Nurse Practitioner)  CELINE HernandezP.RYOSHI as PCP - [facility] Paneled  Tello Fuller O.D. as Consulting Physician (Optometry)  Jason Holbrook P.A.-C. (Endocrinology)  Tsering Rodriguez as    Lester Durham M.D. (Orthopedic Surgery)  CELINE SiddiqiP.RYOSHI (Nurse Practitioner Family)  Rony Nettles M.D. (Geriatrics)    Social History     Tobacco Use    Smoking status: Every Day  "    Packs/day: 0.50     Years: 28.00     Pack years: 14.00     Types: Cigarettes    Smokeless tobacco: Never   Vaping Use    Vaping Use: Never used   Substance Use Topics    Alcohol use: Yes     Alcohol/week: 8.4 - 12.6 oz     Types: 14 - 21 Cans of beer per week     Comment: 2-3 a day    Drug use: Yes     Frequency: 4.0 times per week     Types: Marijuana, Inhaled     Family History   Problem Relation Age of Onset    Diabetes Mother     Hypertension Mother     Hyperlipidemia Mother     Dementia Mother     Heart Disease Father     Hypertension Father     Hyperlipidemia Father     Heart Attack Father     Diabetes Sister     Hyperlipidemia Sister     Hypertension Sister     Diabetes Sister         type 2    Cancer Sister         Lung cancer    Lung Disease Sister     Diabetes Brother         Type 2    Hypertension Brother     Hyperlipidemia Brother      He  has a past medical history of Back pain, DIABETES MELLITUS, Diverticulitis, Dyslipidemia, GOUT, Hyperlipidemia, Hypothyroid, Pneumonia, Post-nasal drip, and Rhinitis.   Past Surgical History:   Procedure Laterality Date    APPENDECTOMY      COLON RESECTION         Exam:   BP 98/56 (BP Location: Right arm, Patient Position: Sitting, BP Cuff Size: Adult)   Pulse 92   Temp 36.6 °C (97.8 °F) (Temporal)   Ht 1.651 m (5' 5\")   Wt 77.6 kg (171 lb)   SpO2 96%  Body mass index is 28.46 kg/m².    Hearing good.    Dentition fair  Alert, oriented in no acute distress.  Eye contact is good, speech goal directed, affect calm      Assessment and Plan. The following treatment and monitoring plan is recommended:      1. Medicare annual wellness visit, subsequent  Have discussed recommended screenings and immunizations.  Due for updated colonoscopy in August.  - Subsequent Annual Wellness Visit - Includes PPPS ()    2. Chronic right shoulder pain  Chronic problem.  Patient would like to have evaluation with orthopedics for possible treatment options including cortisone " injection versus surgery.  - Referral to Orthopedics  - Subsequent Annual Wellness Visit - Includes PPPS ()    3. Uncomplicated alcohol dependence (HCC)  Chronic.  Denies any issues with this, not interfering with his ADLs or social activity.  - Subsequent Annual Wellness Visit - Includes PPPS ()    4. Type 2 diabetes mellitus with diabetic mononeuropathy, without long-term current use of insulin (HCC)  Chronic, stable.  A1c below 7%.  Continue to check feet daily, encouraged exercise.  Continue to follow-up with endocrinology.  - Subsequent Annual Wellness Visit - Includes PPPS ()    5. Cigarette nicotine dependence without complication  Chronic.  Continue to encourage reduction and/or cessation of tobacco products.  - Subsequent Annual Wellness Visit - Includes PPPS ()    6. Vitamin D deficiency disease  Chronic, stable levels.  Continue OTC supplementation.  - Subsequent Annual Wellness Visit - Includes PPPS ()    7. Hypothyroidism (acquired)  Chronic, most recent TSH stable.  Compliant with levothyroxine.  Followed by endocrinology.  - Subsequent Annual Wellness Visit - Includes PPPS ()    8. Dyslipidemia associated with type 2 diabetes mellitus (HCC)  Chronic, stable.  On statin without myalgias.  LDL below 70.  Continue Atorvastatin.  - Subsequent Annual Wellness Visit - Includes PPPS ()    9. Atherosclerosis of both carotid arteries  Known problem present on carotid ultrasound from 2018.  Asymptomatic.  Continue statin, diabetes control, blood pressure control and recommend reducing tobacco products.  Monitor for symptoms.  Consider updating ultrasound  - Subsequent Annual Wellness Visit - Includes PPPS ()     10.  Sleep apnea, unspecified type  Known problem.  Not treated with CPAP due to intolerance.  - Subsequent Annual Wellness Visit - Includes PPPS ()      Services suggested: No services needed at this time  Health Care Screening recommendations as per orders  if indicated.  Referrals offered: PT/OT/Nutrition counseling/Behavioral Health/Smoking cessation as per orders if indicated.    Discussion today about general wellness and lifestyle habits:    Prevent falls and reduce trip hazards; Cautioned about securing or removing rugs.  Have a working fire alarm and carbon monoxide detector;   Engage in regular physical activity and social activities     Follow-up: Return for Annual Wellness Visit.

## 2023-07-24 ENCOUNTER — TELEPHONE (OUTPATIENT)
Dept: ENDOCRINOLOGY | Facility: MEDICAL CENTER | Age: 68
End: 2023-07-24
Payer: MEDICARE

## 2023-07-26 DIAGNOSIS — E11.9 TYPE 2 DIABETES MELLITUS WITH HEMOGLOBIN A1C GOAL OF LESS THAN 7.0% (HCC): ICD-10-CM

## 2023-07-26 RX ORDER — LEVOTHYROXINE SODIUM 0.2 MG/1
200 TABLET ORAL
Qty: 90 TABLET | Refills: 2 | Status: SHIPPED | OUTPATIENT
Start: 2023-07-26 | End: 2023-09-05

## 2023-08-31 ENCOUNTER — HOSPITAL ENCOUNTER (OUTPATIENT)
Dept: LAB | Facility: MEDICAL CENTER | Age: 68
End: 2023-08-31
Attending: INTERNAL MEDICINE
Payer: MEDICARE

## 2023-08-31 DIAGNOSIS — E55.9 VITAMIN D DEFICIENCY: ICD-10-CM

## 2023-08-31 DIAGNOSIS — Z59.89 ON ECONOMIC ASSISTANCE PROGRAM: ICD-10-CM

## 2023-08-31 DIAGNOSIS — E03.9 HYPOTHYROIDISM (ACQUIRED): ICD-10-CM

## 2023-08-31 DIAGNOSIS — E11.9 CONTROLLED TYPE 2 DIABETES MELLITUS WITHOUT COMPLICATION, WITHOUT LONG-TERM CURRENT USE OF INSULIN (HCC): ICD-10-CM

## 2023-08-31 DIAGNOSIS — Z79.84 LONG TERM (CURRENT) USE OF ORAL HYPOGLYCEMIC DRUGS: ICD-10-CM

## 2023-08-31 DIAGNOSIS — E78.5 DYSLIPIDEMIA: ICD-10-CM

## 2023-08-31 LAB
25(OH)D3 SERPL-MCNC: 75 NG/ML (ref 30–100)
ALBUMIN SERPL BCP-MCNC: 4.4 G/DL (ref 3.2–4.9)
ALBUMIN/GLOB SERPL: 1.7 G/DL
ALP SERPL-CCNC: 56 U/L (ref 30–99)
ALT SERPL-CCNC: 24 U/L (ref 2–50)
ANION GAP SERPL CALC-SCNC: 9 MMOL/L (ref 7–16)
AST SERPL-CCNC: 23 U/L (ref 12–45)
BILIRUB SERPL-MCNC: 0.5 MG/DL (ref 0.1–1.5)
BUN SERPL-MCNC: 17 MG/DL (ref 8–22)
CALCIUM ALBUM COR SERPL-MCNC: 9.6 MG/DL (ref 8.5–10.5)
CALCIUM SERPL-MCNC: 9.9 MG/DL (ref 8.5–10.5)
CHLORIDE SERPL-SCNC: 102 MMOL/L (ref 96–112)
CO2 SERPL-SCNC: 26 MMOL/L (ref 20–33)
CREAT SERPL-MCNC: 1.04 MG/DL (ref 0.5–1.4)
GFR SERPLBLD CREATININE-BSD FMLA CKD-EPI: 78 ML/MIN/1.73 M 2
GLOBULIN SER CALC-MCNC: 2.6 G/DL (ref 1.9–3.5)
GLUCOSE SERPL-MCNC: 126 MG/DL (ref 65–99)
POTASSIUM SERPL-SCNC: 4.6 MMOL/L (ref 3.6–5.5)
PROT SERPL-MCNC: 7 G/DL (ref 6–8.2)
SODIUM SERPL-SCNC: 137 MMOL/L (ref 135–145)
T4 FREE SERPL-MCNC: 1.62 NG/DL (ref 0.93–1.7)
TSH SERPL DL<=0.005 MIU/L-ACNC: 0.05 UIU/ML (ref 0.38–5.33)

## 2023-08-31 PROCEDURE — 80053 COMPREHEN METABOLIC PANEL: CPT

## 2023-08-31 PROCEDURE — 84439 ASSAY OF FREE THYROXINE: CPT

## 2023-08-31 PROCEDURE — 82306 VITAMIN D 25 HYDROXY: CPT

## 2023-08-31 PROCEDURE — 36415 COLL VENOUS BLD VENIPUNCTURE: CPT

## 2023-08-31 PROCEDURE — 84443 ASSAY THYROID STIM HORMONE: CPT

## 2023-08-31 SDOH — ECONOMIC STABILITY - INCOME SECURITY: OTHER PROBLEMS RELATED TO HOUSING AND ECONOMIC CIRCUMSTANCES: Z59.89

## 2023-09-05 ENCOUNTER — NON-PROVIDER VISIT (OUTPATIENT)
Dept: ENDOCRINOLOGY | Facility: MEDICAL CENTER | Age: 68
End: 2023-09-05
Attending: INTERNAL MEDICINE
Payer: MEDICARE

## 2023-09-05 VITALS
DIASTOLIC BLOOD PRESSURE: 60 MMHG | WEIGHT: 172 LBS | BODY MASS INDEX: 28.66 KG/M2 | HEART RATE: 80 BPM | HEIGHT: 65 IN | SYSTOLIC BLOOD PRESSURE: 102 MMHG | OXYGEN SATURATION: 98 %

## 2023-09-05 DIAGNOSIS — E03.9 HYPOTHYROIDISM (ACQUIRED): ICD-10-CM

## 2023-09-05 DIAGNOSIS — E78.5 DYSLIPIDEMIA: ICD-10-CM

## 2023-09-05 DIAGNOSIS — E11.9 TYPE 2 DIABETES MELLITUS WITH HEMOGLOBIN A1C GOAL OF LESS THAN 7.0% (HCC): ICD-10-CM

## 2023-09-05 DIAGNOSIS — E55.9 VITAMIN D DEFICIENCY: ICD-10-CM

## 2023-09-05 LAB
HBA1C MFR BLD: 6.2 % (ref ?–5.8)
POCT INT CON NEG: NEGATIVE
POCT INT CON POS: POSITIVE

## 2023-09-05 PROCEDURE — 99212 OFFICE O/P EST SF 10 MIN: CPT | Performed by: INTERNAL MEDICINE

## 2023-09-05 PROCEDURE — 83036 HEMOGLOBIN GLYCOSYLATED A1C: CPT

## 2023-09-05 NOTE — PROGRESS NOTES
RN-CDE Note    Subjective:   Endocrinology Clinic Progress Note  PCP: LEON Hernandez    HPI:  Kristian Frank is a 68 y.o. old patient who is seen today by the Diabetes Nurse Specialist for review of Type 2 Diabetes and Hypothyroidism  Recent changes in health:  Health good.  He may need right shoulder surgery.  He denies any symptoms of getting too much Levothyroxine and he is taking his Levothyroxine 137 mcg 5 days/week.    DM:   Last A1c:   Lab Results   Component Value Date/Time    HBA1C 6.2 (A) 09/05/2023 02:19 PM      Previous A1c was 6.6 on 3/21/23  A1C GOAL: < 7    Diabetes Medications:   Metformin  mg 2 BID  Actos 30 mg daily  Jardiance 10 mg daily  Ozempic 1 mg weekly      Exercise: Walking and hiking  Diet: Late breakfast of cereal or english muffin with peanut butter.  Dinner protein, vegetable, and carbohydrate.  Patient's body mass index is 28.62 kg/m². Exercise and nutrition counseling were performed at this visit.    Glucose monitoring frequency: randomly, usually low 100's    Hypoglycemic episodes: no  Last Retinal Exam: on file and up-to-date  Daily Foot Exam: Yes   Foot Exam:  Monofilament: done  Monofilament testing with a 10 gram force: sensation intact: intact bilaterally  Visual Inspection: Feet without maceration, ulcers, fissures.  Pedal pulses: intact bilaterally   Lab Results   Component Value Date/Time    MICROALBCALC 5.9 10/16/2013 07:12 AM    MALBCRT see below 03/15/2023 08:31 AM    MICROALBUR <1.2 03/15/2023 08:31 AM    MICRALB 7.4 10/16/2013 07:12 AM        ACR Albumin/Creatinine Ratio goal <30     HTN:   Blood pressure goal <130/<80 .   Currently Rx ACE/ARB: No     Dyslipidemia:    Lab Results   Component Value Date/Time    CHOLSTRLTOT 138 03/15/2023 08:32 AM    LDL 69 03/15/2023 08:32 AM    HDL 45 03/15/2023 08:32 AM    TRIGLYCERIDE 122 03/15/2023 08:32 AM         Currently Rx Statin: Yes     He  reports that he has been smoking cigarettes. He has a 14.0  pack-year smoking history. He has never used smokeless tobacco.      Plan:     Discussed and educated on:   - All medications, side effects and compliance (discussed carefully)  - Annual eye examinations at Ophthalmology  - Home glucose monitoring emphasized  - Weight control and daily exercise    Recommended medication changes: No changes at this time.  He will follow up with Dr. Jones in 6 months and his labs done prior to his appointment.  Jardiance 10 mg x8 sampled ( [id number] expir. 8/24)

## 2023-10-04 ENCOUNTER — HOSPITAL ENCOUNTER (OUTPATIENT)
Dept: RADIOLOGY | Facility: MEDICAL CENTER | Age: 68
End: 2023-10-04
Attending: ORTHOPAEDIC SURGERY
Payer: MEDICARE

## 2023-10-04 DIAGNOSIS — M25.511 RIGHT SHOULDER PAIN, UNSPECIFIED CHRONICITY: ICD-10-CM

## 2023-10-04 PROCEDURE — 73200 CT UPPER EXTREMITY W/O DYE: CPT | Mod: RT

## 2023-10-16 ENCOUNTER — APPOINTMENT (OUTPATIENT)
Dept: ADMISSIONS | Facility: MEDICAL CENTER | Age: 68
End: 2023-10-16
Attending: ORTHOPAEDIC SURGERY
Payer: MEDICARE

## 2023-10-17 ENCOUNTER — TELEPHONE (OUTPATIENT)
Dept: ENDOCRINOLOGY | Facility: MEDICAL CENTER | Age: 68
End: 2023-10-17
Payer: MEDICARE

## 2023-10-23 ENCOUNTER — PRE-ADMISSION TESTING (OUTPATIENT)
Dept: ADMISSIONS | Facility: MEDICAL CENTER | Age: 68
End: 2023-10-23
Attending: ORTHOPAEDIC SURGERY
Payer: MEDICARE

## 2023-10-23 VITALS — HEIGHT: 66 IN | BODY MASS INDEX: 27.76 KG/M2

## 2023-10-23 RX ORDER — NAPROXEN 500 MG/1
TABLET ORAL
COMMUNITY

## 2023-10-23 NOTE — PREPROCEDURE INSTRUCTIONS
"Preadmit Phone appointment: \" Preparing for your Procedure information\" Instructions discussed with Patient.       Patient instructed to continue prescribed medications through the day before surgery, instructed to take the following medications the day of surgery per anesthesia protocol: synthroid        Pt states, \"no issues with anesthesia\".  Fasting guidelines discussed with Patient, patient will follow MD's instructions for Pre-Surgery Diet.      All Pt's questions and concerns answered or addressed.  Emailed all instructions.    "

## 2023-10-25 ENCOUNTER — APPOINTMENT (OUTPATIENT)
Dept: ADMISSIONS | Facility: MEDICAL CENTER | Age: 68
End: 2023-10-25
Attending: ORTHOPAEDIC SURGERY
Payer: MEDICARE

## 2023-10-25 DIAGNOSIS — Z01.812 PRE-OPERATIVE LABORATORY EXAMINATION: ICD-10-CM

## 2023-10-25 DIAGNOSIS — Z01.810 PRE-OPERATIVE CARDIOVASCULAR EXAMINATION: ICD-10-CM

## 2023-10-25 LAB
ALBUMIN SERPL BCP-MCNC: 4 G/DL (ref 3.2–4.9)
ALBUMIN/GLOB SERPL: 1.5 G/DL
ALP SERPL-CCNC: 56 U/L (ref 30–99)
ALT SERPL-CCNC: 16 U/L (ref 2–50)
ANION GAP SERPL CALC-SCNC: 11 MMOL/L (ref 7–16)
AST SERPL-CCNC: 14 U/L (ref 12–45)
BILIRUB SERPL-MCNC: 0.4 MG/DL (ref 0.1–1.5)
BUN SERPL-MCNC: 12 MG/DL (ref 8–22)
CALCIUM ALBUM COR SERPL-MCNC: 9.3 MG/DL (ref 8.5–10.5)
CALCIUM SERPL-MCNC: 9.3 MG/DL (ref 8.4–10.2)
CHLORIDE SERPL-SCNC: 104 MMOL/L (ref 96–112)
CO2 SERPL-SCNC: 27 MMOL/L (ref 20–33)
CREAT SERPL-MCNC: 0.98 MG/DL (ref 0.5–1.4)
EKG IMPRESSION: NORMAL
ERYTHROCYTE [DISTWIDTH] IN BLOOD BY AUTOMATED COUNT: 48.6 FL (ref 35.9–50)
GFR SERPLBLD CREATININE-BSD FMLA CKD-EPI: 83 ML/MIN/1.73 M 2
GLOBULIN SER CALC-MCNC: 2.6 G/DL (ref 1.9–3.5)
GLUCOSE SERPL-MCNC: 107 MG/DL (ref 65–99)
HCT VFR BLD AUTO: 45.3 % (ref 42–52)
HGB BLD-MCNC: 15.4 G/DL (ref 14–18)
MCH RBC QN AUTO: 31.7 PG (ref 27–33)
MCHC RBC AUTO-ENTMCNC: 34 G/DL (ref 32.3–36.5)
MCV RBC AUTO: 93.2 FL (ref 81.4–97.8)
PLATELET # BLD AUTO: 282 K/UL (ref 164–446)
PMV BLD AUTO: 10.9 FL (ref 9–12.9)
POTASSIUM SERPL-SCNC: 4 MMOL/L (ref 3.6–5.5)
PROT SERPL-MCNC: 6.6 G/DL (ref 6–8.2)
RBC # BLD AUTO: 4.86 M/UL (ref 4.7–6.1)
SCCMEC + MECA PNL NOSE NAA+PROBE: NEGATIVE
SCCMEC + MECA PNL NOSE NAA+PROBE: NEGATIVE
SODIUM SERPL-SCNC: 142 MMOL/L (ref 135–145)
WBC # BLD AUTO: 6.9 K/UL (ref 4.8–10.8)

## 2023-10-25 PROCEDURE — 93010 ELECTROCARDIOGRAM REPORT: CPT | Performed by: INTERNAL MEDICINE

## 2023-10-25 PROCEDURE — 87640 STAPH A DNA AMP PROBE: CPT | Mod: XU

## 2023-10-25 PROCEDURE — 93005 ELECTROCARDIOGRAM TRACING: CPT

## 2023-10-25 PROCEDURE — 87641 MR-STAPH DNA AMP PROBE: CPT

## 2023-10-25 PROCEDURE — 80053 COMPREHEN METABOLIC PANEL: CPT

## 2023-10-25 PROCEDURE — 85027 COMPLETE CBC AUTOMATED: CPT

## 2023-10-25 PROCEDURE — 36415 COLL VENOUS BLD VENIPUNCTURE: CPT

## 2023-10-25 NOTE — DISCHARGE PLANNING
DISCHARGE PLANNING NOTE - TOTAL JOINT    Procedure: Procedure(s):  RIGHT TOTAL SHOULDER ARTHROPLASTY AND REPAIRS AS INDICATED  Procedure Date: 11/7/2023  Insurance: Payor: [facility] SENIOR CARE PLUS / Plan: [facility] SCP ESSENTIAL  / Product Type: Medicare Advantage /    Equipment currently available at home?   Ice.  Steps into the home? 0  Steps within the home? 0  Toilet height? Standard  Type of shower? walk-in shower  Home Oxygen? No.  Portable tank?    Oxygen Provider:  Who will be with you during your recovery? spouse  Is Outpatient Physical Therapy set up after surgery? No   Did you take the Total Joint Class and where? Yes, received NAON book.  Planning same day discharge?Yes     This writer met with pt and educated to preop showers, nasal mrsa swab which was obtained by this writer, and potential for overnight stay. CHG kit given to pt. Home safety checklist and equipment resource guide sent home with pt. Pt educated to dc criteria. All questions answered and verbalizes understanding of all instructions. No dc needs identified at this time. Anticipate dc to home without barriers.

## 2023-11-07 ENCOUNTER — HOSPITAL ENCOUNTER (OUTPATIENT)
Facility: MEDICAL CENTER | Age: 68
End: 2023-11-07
Attending: ORTHOPAEDIC SURGERY | Admitting: ORTHOPAEDIC SURGERY
Payer: MEDICARE

## 2023-11-07 ENCOUNTER — ANESTHESIA EVENT (OUTPATIENT)
Dept: SURGERY | Facility: MEDICAL CENTER | Age: 68
End: 2023-11-07
Payer: MEDICARE

## 2023-11-07 ENCOUNTER — ANESTHESIA (OUTPATIENT)
Dept: SURGERY | Facility: MEDICAL CENTER | Age: 68
End: 2023-11-07
Payer: MEDICARE

## 2023-11-07 ENCOUNTER — APPOINTMENT (OUTPATIENT)
Dept: RADIOLOGY | Facility: MEDICAL CENTER | Age: 68
End: 2023-11-07
Attending: ORTHOPAEDIC SURGERY
Payer: MEDICARE

## 2023-11-07 VITALS
BODY MASS INDEX: 27.46 KG/M2 | HEIGHT: 66 IN | DIASTOLIC BLOOD PRESSURE: 65 MMHG | OXYGEN SATURATION: 91 % | WEIGHT: 170.86 LBS | HEART RATE: 80 BPM | TEMPERATURE: 96.8 F | RESPIRATION RATE: 17 BRPM | SYSTOLIC BLOOD PRESSURE: 92 MMHG

## 2023-11-07 LAB — GLUCOSE BLD STRIP.AUTO-MCNC: 119 MG/DL (ref 65–99)

## 2023-11-07 PROCEDURE — 160041 HCHG SURGERY MINUTES - EA ADDL 1 MIN LEVEL 4: Performed by: ORTHOPAEDIC SURGERY

## 2023-11-07 PROCEDURE — 160002 HCHG RECOVERY MINUTES (STAT): Performed by: ORTHOPAEDIC SURGERY

## 2023-11-07 PROCEDURE — C1713 ANCHOR/SCREW BN/BN,TIS/BN: HCPCS | Performed by: ORTHOPAEDIC SURGERY

## 2023-11-07 PROCEDURE — 64415 NJX AA&/STRD BRCH PLXS IMG: CPT | Performed by: ORTHOPAEDIC SURGERY

## 2023-11-07 PROCEDURE — 700102 HCHG RX REV CODE 250 W/ 637 OVERRIDE(OP): Performed by: ANESTHESIOLOGY

## 2023-11-07 PROCEDURE — C1776 JOINT DEVICE (IMPLANTABLE): HCPCS | Performed by: ORTHOPAEDIC SURGERY

## 2023-11-07 PROCEDURE — 82962 GLUCOSE BLOOD TEST: CPT

## 2023-11-07 PROCEDURE — 700111 HCHG RX REV CODE 636 W/ 250 OVERRIDE (IP): Mod: JZ | Performed by: ANESTHESIOLOGY

## 2023-11-07 PROCEDURE — C9290 INJ, BUPIVACAINE LIPOSOME: HCPCS | Mod: JZ | Performed by: ANESTHESIOLOGY

## 2023-11-07 PROCEDURE — 97165 OT EVAL LOW COMPLEX 30 MIN: CPT

## 2023-11-07 PROCEDURE — 160047 HCHG PACU  - EA ADDL 30 MINS PHASE II: Performed by: ORTHOPAEDIC SURGERY

## 2023-11-07 PROCEDURE — 160009 HCHG ANES TIME/MIN: Performed by: ORTHOPAEDIC SURGERY

## 2023-11-07 PROCEDURE — 700111 HCHG RX REV CODE 636 W/ 250 OVERRIDE (IP): Mod: JZ | Performed by: ORTHOPAEDIC SURGERY

## 2023-11-07 PROCEDURE — 700105 HCHG RX REV CODE 258: Performed by: ORTHOPAEDIC SURGERY

## 2023-11-07 PROCEDURE — 160035 HCHG PACU - 1ST 60 MINS PHASE I: Performed by: ORTHOPAEDIC SURGERY

## 2023-11-07 PROCEDURE — A9270 NON-COVERED ITEM OR SERVICE: HCPCS | Performed by: ANESTHESIOLOGY

## 2023-11-07 PROCEDURE — 97535 SELF CARE MNGMENT TRAINING: CPT

## 2023-11-07 PROCEDURE — 160029 HCHG SURGERY MINUTES - 1ST 30 MINS LEVEL 4: Performed by: ORTHOPAEDIC SURGERY

## 2023-11-07 PROCEDURE — 160048 HCHG OR STATISTICAL LEVEL 1-5: Performed by: ORTHOPAEDIC SURGERY

## 2023-11-07 PROCEDURE — 160025 RECOVERY II MINUTES (STATS): Performed by: ORTHOPAEDIC SURGERY

## 2023-11-07 PROCEDURE — 502000 HCHG MISC OR IMPLANTS RC 0278: Performed by: ORTHOPAEDIC SURGERY

## 2023-11-07 PROCEDURE — 160046 HCHG PACU - 1ST 60 MINS PHASE II: Performed by: ORTHOPAEDIC SURGERY

## 2023-11-07 PROCEDURE — 73020 X-RAY EXAM OF SHOULDER: CPT | Mod: RT

## 2023-11-07 PROCEDURE — 700101 HCHG RX REV CODE 250: Performed by: ORTHOPAEDIC SURGERY

## 2023-11-07 PROCEDURE — 700101 HCHG RX REV CODE 250: Performed by: ANESTHESIOLOGY

## 2023-11-07 DEVICE — CEMENT BONE SIMPLEX SPEEDSET FULL DOSE (10EA/BX): Type: IMPLANTABLE DEVICE | Site: SHOULDER | Status: FUNCTIONAL

## 2023-11-07 DEVICE — IMPLANTABLE DEVICE: Type: IMPLANTABLE DEVICE | Site: SHOULDER | Status: FUNCTIONAL

## 2023-11-07 DEVICE — ANCHOR SUTURE ICONIX 3 WITH 3 STRANDS #2 FORCE FIBER 2.3MM (5EA/BX): Type: IMPLANTABLE DEVICE | Site: SHOULDER | Status: FUNCTIONAL

## 2023-11-07 RX ORDER — VANCOMYCIN HYDROCHLORIDE 1 G/20ML
INJECTION, POWDER, LYOPHILIZED, FOR SOLUTION INTRAVENOUS
Status: COMPLETED | OUTPATIENT
Start: 2023-11-07 | End: 2023-11-07

## 2023-11-07 RX ORDER — ONDANSETRON 2 MG/ML
4 INJECTION INTRAMUSCULAR; INTRAVENOUS
Status: DISCONTINUED | OUTPATIENT
Start: 2023-11-07 | End: 2023-11-07 | Stop reason: HOSPADM

## 2023-11-07 RX ORDER — BUPIVACAINE HYDROCHLORIDE 5 MG/ML
INJECTION, SOLUTION EPIDURAL; INTRACAUDAL PRN
Status: DISCONTINUED | OUTPATIENT
Start: 2023-11-07 | End: 2023-11-07 | Stop reason: SURG

## 2023-11-07 RX ORDER — TRANEXAMIC ACID 100 MG/ML
INJECTION, SOLUTION INTRAVENOUS PRN
Status: DISCONTINUED | OUTPATIENT
Start: 2023-11-07 | End: 2023-11-07 | Stop reason: SURG

## 2023-11-07 RX ORDER — HYDROMORPHONE HYDROCHLORIDE 1 MG/ML
0.2 INJECTION, SOLUTION INTRAMUSCULAR; INTRAVENOUS; SUBCUTANEOUS
Status: DISCONTINUED | OUTPATIENT
Start: 2023-11-07 | End: 2023-11-07 | Stop reason: HOSPADM

## 2023-11-07 RX ORDER — DIPHENHYDRAMINE HYDROCHLORIDE 50 MG/ML
12.5 INJECTION INTRAMUSCULAR; INTRAVENOUS
Status: DISCONTINUED | OUTPATIENT
Start: 2023-11-07 | End: 2023-11-07 | Stop reason: HOSPADM

## 2023-11-07 RX ORDER — LABETALOL HYDROCHLORIDE 5 MG/ML
5 INJECTION, SOLUTION INTRAVENOUS
Status: DISCONTINUED | OUTPATIENT
Start: 2023-11-07 | End: 2023-11-07 | Stop reason: HOSPADM

## 2023-11-07 RX ORDER — HALOPERIDOL 5 MG/ML
1 INJECTION INTRAMUSCULAR
Status: DISCONTINUED | OUTPATIENT
Start: 2023-11-07 | End: 2023-11-07 | Stop reason: HOSPADM

## 2023-11-07 RX ORDER — HYDRALAZINE HYDROCHLORIDE 20 MG/ML
5 INJECTION INTRAMUSCULAR; INTRAVENOUS
Status: DISCONTINUED | OUTPATIENT
Start: 2023-11-07 | End: 2023-11-07 | Stop reason: HOSPADM

## 2023-11-07 RX ORDER — EPHEDRINE SULFATE 50 MG/ML
5 INJECTION, SOLUTION INTRAVENOUS
Status: DISCONTINUED | OUTPATIENT
Start: 2023-11-07 | End: 2023-11-07 | Stop reason: HOSPADM

## 2023-11-07 RX ORDER — ROCURONIUM BROMIDE 10 MG/ML
INJECTION, SOLUTION INTRAVENOUS PRN
Status: DISCONTINUED | OUTPATIENT
Start: 2023-11-07 | End: 2023-11-07 | Stop reason: SURG

## 2023-11-07 RX ORDER — HYDROMORPHONE HYDROCHLORIDE 1 MG/ML
0.4 INJECTION, SOLUTION INTRAMUSCULAR; INTRAVENOUS; SUBCUTANEOUS
Status: DISCONTINUED | OUTPATIENT
Start: 2023-11-07 | End: 2023-11-07 | Stop reason: HOSPADM

## 2023-11-07 RX ORDER — HYDROMORPHONE HYDROCHLORIDE 1 MG/ML
0.1 INJECTION, SOLUTION INTRAMUSCULAR; INTRAVENOUS; SUBCUTANEOUS
Status: DISCONTINUED | OUTPATIENT
Start: 2023-11-07 | End: 2023-11-07 | Stop reason: HOSPADM

## 2023-11-07 RX ORDER — OXYCODONE HCL 5 MG/5 ML
5 SOLUTION, ORAL ORAL
Status: DISCONTINUED | OUTPATIENT
Start: 2023-11-07 | End: 2023-11-07 | Stop reason: HOSPADM

## 2023-11-07 RX ORDER — CEFAZOLIN SODIUM 1 G/3ML
2 INJECTION, POWDER, FOR SOLUTION INTRAMUSCULAR; INTRAVENOUS ONCE
Status: COMPLETED | OUTPATIENT
Start: 2023-11-07 | End: 2023-11-07

## 2023-11-07 RX ORDER — CELECOXIB 200 MG/1
200 CAPSULE ORAL ONCE
Status: COMPLETED | OUTPATIENT
Start: 2023-11-07 | End: 2023-11-07

## 2023-11-07 RX ORDER — ACETAMINOPHEN 500 MG
1000 TABLET ORAL ONCE
Status: COMPLETED | OUTPATIENT
Start: 2023-11-07 | End: 2023-11-07

## 2023-11-07 RX ORDER — DEXAMETHASONE SODIUM PHOSPHATE 4 MG/ML
INJECTION, SOLUTION INTRA-ARTICULAR; INTRALESIONAL; INTRAMUSCULAR; INTRAVENOUS; SOFT TISSUE PRN
Status: DISCONTINUED | OUTPATIENT
Start: 2023-11-07 | End: 2023-11-07 | Stop reason: SURG

## 2023-11-07 RX ORDER — SODIUM CHLORIDE, SODIUM LACTATE, POTASSIUM CHLORIDE, CALCIUM CHLORIDE 600; 310; 30; 20 MG/100ML; MG/100ML; MG/100ML; MG/100ML
INJECTION, SOLUTION INTRAVENOUS CONTINUOUS
Status: DISCONTINUED | OUTPATIENT
Start: 2023-11-07 | End: 2023-11-07 | Stop reason: HOSPADM

## 2023-11-07 RX ORDER — SODIUM CHLORIDE, SODIUM LACTATE, POTASSIUM CHLORIDE, CALCIUM CHLORIDE 600; 310; 30; 20 MG/100ML; MG/100ML; MG/100ML; MG/100ML
INJECTION, SOLUTION INTRAVENOUS CONTINUOUS
Status: ACTIVE | OUTPATIENT
Start: 2023-11-07 | End: 2023-11-07

## 2023-11-07 RX ORDER — OXYCODONE HCL 5 MG/5 ML
10 SOLUTION, ORAL ORAL
Status: DISCONTINUED | OUTPATIENT
Start: 2023-11-07 | End: 2023-11-07 | Stop reason: HOSPADM

## 2023-11-07 RX ORDER — ONDANSETRON 2 MG/ML
INJECTION INTRAMUSCULAR; INTRAVENOUS PRN
Status: DISCONTINUED | OUTPATIENT
Start: 2023-11-07 | End: 2023-11-07 | Stop reason: SURG

## 2023-11-07 RX ADMIN — DEXAMETHASONE SODIUM PHOSPHATE 8 MG: 4 INJECTION INTRA-ARTICULAR; INTRALESIONAL; INTRAMUSCULAR; INTRAVENOUS; SOFT TISSUE at 07:09

## 2023-11-07 RX ADMIN — ONDANSETRON 4 MG: 2 INJECTION INTRAMUSCULAR; INTRAVENOUS at 08:03

## 2023-11-07 RX ADMIN — ACETAMINOPHEN 1000 MG: 500 TABLET ORAL at 06:39

## 2023-11-07 RX ADMIN — BUPIVACAINE HYDROCHLORIDE 10 ML: 5 INJECTION, SOLUTION EPIDURAL; INTRACAUDAL at 06:43

## 2023-11-07 RX ADMIN — BUPIVACAINE 10 ML: 13.3 INJECTION, SUSPENSION, LIPOSOMAL INFILTRATION at 06:43

## 2023-11-07 RX ADMIN — ROCURONIUM BROMIDE 50 MG: 50 INJECTION, SOLUTION INTRAVENOUS at 07:08

## 2023-11-07 RX ADMIN — ROCURONIUM BROMIDE 30 MG: 50 INJECTION, SOLUTION INTRAVENOUS at 07:50

## 2023-11-07 RX ADMIN — TRANEXAMIC ACID 1000 MG: 100 INJECTION, SOLUTION INTRAVENOUS at 08:22

## 2023-11-07 RX ADMIN — CEFAZOLIN 2 G: 1 INJECTION, POWDER, FOR SOLUTION INTRAMUSCULAR; INTRAVENOUS at 07:02

## 2023-11-07 RX ADMIN — FENTANYL CITRATE 50 MCG: 50 INJECTION, SOLUTION INTRAMUSCULAR; INTRAVENOUS at 08:17

## 2023-11-07 RX ADMIN — SUGAMMADEX 200 MG: 100 INJECTION, SOLUTION INTRAVENOUS at 08:05

## 2023-11-07 RX ADMIN — SODIUM CHLORIDE, POTASSIUM CHLORIDE, SODIUM LACTATE AND CALCIUM CHLORIDE: 600; 310; 30; 20 INJECTION, SOLUTION INTRAVENOUS at 06:37

## 2023-11-07 RX ADMIN — TRANEXAMIC ACID 1000 MG: 100 INJECTION, SOLUTION INTRAVENOUS at 07:02

## 2023-11-07 RX ADMIN — CELECOXIB 200 MG: 200 CAPSULE ORAL at 06:39

## 2023-11-07 RX ADMIN — PROPOFOL 150 MG: 10 INJECTION, EMULSION INTRAVENOUS at 07:01

## 2023-11-07 RX ADMIN — FENTANYL CITRATE 50 MCG: 50 INJECTION, SOLUTION INTRAMUSCULAR; INTRAVENOUS at 08:03

## 2023-11-07 RX ADMIN — LIDOCAINE HYDROCHLORIDE 0.5 ML: 10 INJECTION, SOLUTION EPIDURAL; INFILTRATION; INTRACAUDAL; PERINEURAL at 06:37

## 2023-11-07 RX ADMIN — FENTANYL CITRATE 50 MCG: 50 INJECTION, SOLUTION INTRAMUSCULAR; INTRAVENOUS at 07:12

## 2023-11-07 ASSESSMENT — COGNITIVE AND FUNCTIONAL STATUS - GENERAL
HELP NEEDED FOR BATHING: A LOT
EATING MEALS: A LITTLE
DRESSING REGULAR UPPER BODY CLOTHING: A LOT
PERSONAL GROOMING: A LITTLE
SUGGESTED CMS G CODE MODIFIER DAILY ACTIVITY: CK
DRESSING REGULAR LOWER BODY CLOTHING: A LITTLE
DAILY ACTIVITIY SCORE: 16
TOILETING: A LITTLE

## 2023-11-07 ASSESSMENT — FIBROSIS 4 INDEX: FIB4 SCORE: 0.86

## 2023-11-07 ASSESSMENT — PAIN DESCRIPTION - PAIN TYPE
TYPE: SURGICAL PAIN
TYPE: SURGICAL PAIN

## 2023-11-07 ASSESSMENT — ACTIVITIES OF DAILY LIVING (ADL): TOILETING: INDEPENDENT

## 2023-11-07 ASSESSMENT — PAIN SCALES - GENERAL: PAIN_LEVEL: 0

## 2023-11-07 ASSESSMENT — GAIT ASSESSMENTS: DISTANCE (FEET): 90

## 2023-11-07 NOTE — OR NURSING
0834  Arrived to PACU. Report received from anesthesia/OR circulator. Patient care assumed.     0834 Sleeping, respirations spontaneous and non-labored via OAW.      0835 Dressings: Aquacel dressing to RIGHT shoulder CDI. No redness, warmth or swelling noted. Ice pack applied to surgical site.     [id number] Report to Miguelito BLACK.

## 2023-11-07 NOTE — ANESTHESIA PROCEDURE NOTES
Airway    Date/Time: 11/7/2023 7:01 AM    Performed by: Keith Canas M.D.  Authorized by: Keith Canas M.D.    Location:  OR  Urgency:  Elective  Indications for Airway Management:  Anesthesia      Spontaneous Ventilation: absent    Sedation Level:  Deep  Preoxygenated: Yes    Final Airway Type:  Supraglottic airway  Final Supraglottic Airway:  Standard LMA    SGA Size:  4  Number of Attempts at Approach:  1

## 2023-11-07 NOTE — OP REPORT
DATE OF SERVICE:  11/07/2023     PREOPERATIVE DIAGNOSIS:  Severe glenohumeral joint arthritis, right shoulder.     POSTOPERATIVE DIAGNOSIS:  Severe glenohumeral joint arthritis, right shoulder.     PROCEDURE:  Right primary total shoulder arthroplasty with proximal biceps   tenodesis.     SURGEON:  Montserrat Varghese MD     ASSISTANT:  JULIANE Hameed     ANESTHESIOLOGIST:  Keith Canas MD     TYPE OF ANESTHESIA:  General, with preoperative interscalene nerve block using   Exparel.     INTRAVENOUS FLUID:  1 liter crystalloid.     ESTIMATED BLOOD LOSS:  100 mL.     DRAINS:  None.     SPECIMENS:  None.     COMPLICATIONS:  None.     IMPLANTS:  Billings size 3 Simpliciti Nucleus, size 50x19 humeral head, large   15-degree PerFORM Plus CortiLoc glenoid cemented with single batch of Simplex   cement, Billings Iconix anchors x2.     REASON FOR PROCEDURE:  The patient is a 68-year-old male with a longstanding   history of right shoulder pain.  We reviewed plain x-ray as well as CT   blueprint scan findings demonstrating severe glenohumeral joint arthritis.  He   failed to definitively respond to nonoperative measures and thus we decided   to proceed with arthroplasty.     OPERATION:  The patient was given a right interscalene nerve block by the   anesthesiologist before surgery.  Once back in the operating room, a breathing   tube was placed.  He was given 2 g of IV Ancef as well as 1 g of tranexamic   acid.  He was placed in the beach-chair position.  The right upper extremity   was prepped with ChloraPrep and draped in standard sterile fashion.  It was   then placed in the spider articulating arm chao.  A deltopectoral incision   was made.  The cephalic vein was identified and retracted laterally.  A   Bankart retractor was placed.  The rotator cuff was noted to be intact   circumferentially.  Using a peel-off technique, the upper corner of the   subscapularis tendon was tagged and with progressive external rotation,  access   was gained to the severely arthritic glenohumeral joint.  A cutting guide was   pinned in place, and the CT blueprint scan was referenced.  Using anatomical   landmarks as well as the CT blueprint and the cutting guide, the humeral neck   cut was then made.  Bone quality was noted to be sufficient for a canal   sparing implant and thus a Simpliciti humeral stem proceeded.  Peripheral   osteophytes were removed.  The biceps tendon was released later to be   tenodesed at the conclusion of the procedure at the time of the subscapularis   tendon repair.  A guide for the size 3 Simpliciti Nucleus was brought up and   the central pin was drilled through the far cortex.  A planer was used.  The   central drill was used.  A downsized Blazer was placed with a cut protector.    Retractors were then placed circumferentially around the glenoid.  What   remained of the labrum was removed.  A 15-degree large PerFORM guide was   brought up and the central pin was drilled through the far cortex.  The paleo   reamer was used first, followed by the anti-rotation drill.  The DADA reamer   was used next set at 15 degrees, which had been templated.  The 3 peripheral   pegs were drilled, followed by the central hole.  The central pin was removed.    After further irrigation, all holes were filled with thrombin-soaked Gelfoam   to create an optimally dry environment.  All holes were then filled with   cement.  A 15-degree size large PerFORM Plus glenoid was impacted with an   excellent overall fit noted.  It was held with direct pressure until the   cement had thoroughly cured.     Attention was then turned back to the proximal humerus, where humeral head   trials were used.  A 50x19 was chosen.  All humeral trial components were   removed and 2 drill holes were placed through the lesser tuberosity with 2.3   mm Iconix anchors drilled through each one.  A size 3 Simpliciti Nucleus was   impacted, followed by 50x19 head.  The  shoulder was reduced.  Excellent   stability and tension was noted.  Using the sutures from the Iconix anchors,   multiple modified Kenyon-Jaron sutures were placed through the subscapularis   tendon.  This allowed for appropriate closure.  The rotator interval was then   closed with an additional XBraid.  The subscapularis tendon repair was then   oversewn with an additional XBraid.  The biceps tendon was tenodesed in the   groove with an additional XBraid.  One gram of vancomycin powder was placed in   the deep and superficial soft tissues.  The deltopectoral interval was closed   with 0 Vicryl.  A running 2-0 Monocryl was used, followed by running 3-0   Monocryl in subcutaneous and subcuticular layers.  Benzoin and Steri-Strips   were placed, followed by a sterile Aquacel dressing.  All drapes were removed,   and the arm was carefully taken out of the spider articulating arm chao and   placed into a shoulder abduction sling.  The patient was placed supine on a   stretcher and taken to recovery room, in stable condition.        ______________________________  MD BARBARA FAIRCHILD/MERY    DD:  11/07/2023 08:55  DT:  11/07/2023 09:37    Job#:  [id number]

## 2023-11-07 NOTE — ANESTHESIA POSTPROCEDURE EVALUATION
Patient: Kristian Frank    Procedure Summary     Date: 11/07/23 Room / Location:  OR  / SURGERY [facility]    Anesthesia Start: 0657 Anesthesia Stop: 0836    Procedure: RIGHT TOTAL SHOULDER ARTHROPLASTY AND REPAIRS AS INDICATED (Right: Shoulder) Diagnosis: (OTHER SPECIFIED JOINT DISORDERS, RIGHT SHOULDER)    Surgeons: Montserrat Varghese M.D. Responsible Provider: Keith Canas M.D.    Anesthesia Type: general, peripheral nerve block ASA Status: 2          Final Anesthesia Type: general, peripheral nerve block  Last vitals  BP   Blood Pressure : 92/65    Temp   36 °C (96.8 °F)    Pulse   80   Resp   17    SpO2   90 %      Anesthesia Post Evaluation    Patient location during evaluation: PACU  Patient participation: complete - patient participated  Level of consciousness: awake and alert  Pain score: 0    Airway patency: patent  Anesthetic complications: no  Cardiovascular status: hemodynamically stable  Respiratory status: acceptable  Hydration status: euvolemic    PONV: none          No notable events documented.     Nurse Pain Score: 0 (NPRS)

## 2023-11-07 NOTE — ANESTHESIA PROCEDURE NOTES
Peripheral Block    Date/Time: 11/7/2023 6:41 AM    Performed by: Keith Canas M.D.  Authorized by: Keith Canas M.D.    Start Time:  11/7/2023 6:41 AM  End Time:  11/7/2023 6:45 AM  Reason for Block: at surgeon's request and post-op pain management ONLY    patient identified, IV checked, site marked, risks and benefits discussed, surgical consent, monitors and equipment checked, pre-op evaluation and timeout performed    Patient Position:  Supine  Prep: ChloraPrep    Monitoring:  Heart rate, continuous pulse ox and cardiac monitor  Block Region:  Upper Extremity  Upper Extremity - Block Type:  BRACHIAL PLEXUS block, Supraclavicular approach    Laterality:  Right  Procedures: ultrasound guided  Image captured, interpreted and electronically stored.  Local Infiltration:  Lidocaine  Strength:  1 %  Dose:  3 ml  Block Type:  Single-shot  Needle Length:  100mm  Needle Gauge:  21 G  Needle Localization:  Ultrasound guidance  Ultrasound picture in chart  Injection Assessment:  Negative aspiration for heme, no paresthesia on injection, incremental injection and local visualized surrounding nerve on ultrasound  Evidence of intravascular injection: No     US Guided Supraclavicular Brachial Plexus Block    US transducer placed cephalad and parallel to clavicle in angle to view the subclavian artery with the brachial plexus lateral and superficial to the artery. Needle inserted lateral to the transducer in-plane and advanced with the needle tip visualized continually into the perineural position. After negative aspiration, LA injected without resistance.

## 2023-11-07 NOTE — OR NURSING
0530 PT TO PRE OP TO ASSUME CARE.    0650 Patient allergies and NPO status verified, home medication reconciliation completed and belongings secured. Patient verbalizes understanding of pain scale, expected course of stay and plan of care. Surgical site verified with patient. IV access established. Sequentials placed on  B legs

## 2023-11-07 NOTE — THERAPY
Occupational Therapy   Initial Evaluation     Patient Name: Kristian Frank  Age:  68 y.o., Sex:  male  Medical Record #: [id number]  Today's Date: 11/7/2023     Precautions  Precautions: Non Weight Bearing Right Upper Extremity, Immobilizer Right Upper Extremity    Assessment  Patient is 68 y.o. male admit for R TSA.  Pt tolerated UB dressing and brace mgmt training, ADL transfers and functional mobility.  Pt demos good safety awareness with self care tasks, exercises, transfers and ADL's.  OT adjusted brace for best fit.  Pt will have assist avail from spouse at home.  Pt and spouse attentive to education as stated below.  Pt appears appropriate for discharge home.      Treatment completed this session after initial evaluation completed:  Pt and spouse educated in detail on ADL's after Total Shoulder surgery.   Patient specifically educated on surgeon's post-operative precautions including NWB, no pushing pulling or lifting with surgery arm.    Summary of education completed:  How to adjust sling/immobilizer for best fit.  To keep sling positioned so hand is level or slightly above elbow to reduce pull of gravity on arm and maintain shoulder in neutral positioning.  How to don & doff sling/immobilizer safely and appropriately for dressing and bathing.  How to dress upper body while maintaining post surgical precautions.  How to shower safely.  How to appropriately cover incision for showering if needed prior to removal of post op dressing.    Proper positioning of arm during showering.   Encouraged use of hand held shower (using the non-operative extremity) to keep water away from the incision site, and to not spray water directly under the axilla of the operated side if the bandage is compromised at all.   Safe shower and toilet transfers.  Proper positioning while sleeping either in bed or recliner  Encouraged patient to support arm with pillows under forearm when sitting to reduce strain on the neck  "from the weight of the arm and immobilizer.   Proper bed mobility and transfer techniques while maintaining NWB on surgical arm.  Proper positioning of arm for gentle wrist/hand ROM and passive elbow extension and Pendulum Ex's for shoulder.   Pain management education including the use of ice and positioning for optimal comfort.     Pt and spouse verbalized and demonstrated understanding of education provided.     Plan    Occupational Therapy Initial Treatment Plan   Duration: Evaluation only    DC Equipment Recommendations: None  Discharge Recommendations: Anticipate that the patient will have no further occupational therapy needs after discharge from the hospital     Subjective    \"Thank you for all the information.\"     Objective       11/07/23 1105   Prior Living Situation   Prior Services Home-Independent   Housing / Facility 1 Story House   Steps Into Home 0   Steps In Home 0   Bathroom Set up Walk In Shower   Equipment Owned None   Lives with - Patient's Self Care Capacity Spouse   Comments Spouse retired RN, able to assist as needed   Prior Level of ADL Function   Self Feeding Independent   Grooming / Hygiene Independent   Bathing Independent   Dressing Independent   Toileting Independent   Prior Level of IADL Function   Medication Management Independent   Laundry Independent   Kitchen Mobility Independent   Finances Independent   Home Management Independent   Shopping Independent   Prior Level Of Mobility Independent Without Device in Community   Driving / Transportation Driving Independent   Occupation (Pre-Hospital Vocational) Retired Due To Age   Precautions   Precautions Non Weight Bearing Right Upper Extremity;Immobilizer Right Upper Extremity   Vitals   Pulse Oximetry 91 %   O2 Delivery Device None - Room Air   Pain 0 - 10 Group   Therapist Pain Assessment 0;During Activity;Nurse Notified   Cognition    Cognition / Consciousness WDL   Comments alert, attentive, eager to partic in therapy   Active ROM " Upper Body   Active ROM Upper Body  X   Dominant Hand Right   Comments RUE immobilized with nerve block in effect   Strength Upper Body   Comments RUE immobilized with nerve block in effect   Sensation Upper Body   Comments RUE immobilized with nerve block in effect   Upper Body Muscle Tone   Comments RUE immobilized with nerve block in effect   Coordination Upper Body   Comments RUE immobilized with nerve block in effect   Balance Assessment   Sitting Balance (Static) Good   Sitting Balance (Dynamic) Fair +   Standing Balance (Static) Fair   Standing Balance (Dynamic) Fair -   Weight Shift Sitting Good   Weight Shift Standing Fair   Comments CGA no device in standing   Bed Mobility    Comments UIC t/o in stage 2   ADL Assessment   Upper Body Dressing Maximal Assist   Lower Body Dressing Minimal Assist   How much help from another person does the patient currently need...   Putting on and taking off regular lower body clothing? 3   Bathing (including washing, rinsing, and drying)? 2   Toileting, which includes using a toilet, bedpan, or urinal? 3   Putting on and taking off regular upper body clothing? 2   Taking care of personal grooming such as brushing teeth? 3   Eating meals? 3   6 Clicks Daily Activity Score 16   Functional Mobility   Sit to Stand Contact Guard Assist   Bed, Chair, Wheelchair Transfer Contact Guard Assist   Transfer Method Stand Step   Mobility CGA HHA to ambulate, feeling slightly unsteady   Distance (Feet) 90   # of Times Distance was Traveled 1   Visual Perception   Visual Perception  WDL   Edema / Skin Assessment   Comments aquacel dressing intaact   Activity Tolerance   Sitting in Chair > 60 min   Standing 5 min x2   Patient / Family Goals   Patient / Family Goal #1 home   Education Group   Education Provided Brace Wear and Care;Role of Occupational Therapist;Activities of Daily Living   Role of Occupational Therapist Patient Response Patient;Family;Acceptance;Explanation;Verbal  Demonstration   Brace Wear & Care Patient Response Patient;Family;Acceptance;Explanation;Demonstration;Handout;Verbal Demonstration   ADL Patient Response Patient;Family;Acceptance;Explanation;Demonstration;Handout;Verbal Demonstration   Additional Comments see notes

## 2023-11-07 NOTE — OR NURSING
0910 Pt wife updated on progress, POC and ETA for DC    0926 Pt states pain is controled and tolerable, denies nausea. Pt tolerating PO fluids. Pt to phase II

## 2023-11-07 NOTE — OR NURSING
0926: Patient arrived to phase 2. Patient changed out of surgical gown into clothes. Patient is A&Ox4. Patient reports no pain and no nausea. Vital signs taken and patient assessed. Asked the patient if they had to use the bathroom.    1002: OT here to see patient.    1120: IV removed and discharge instructions read. All questions answered.     1129: Discharged to care of responsible adult via wheelchair by CNA.

## 2023-11-07 NOTE — DISCHARGE INSTRUCTIONS
Montserrat Varghese MD     Office telephone:  [phone]     Shoulder Total Joint Replacement   Post-Operative Instructions       Nerve Block:  The effects of the nerve block may last from 12-60 hours depending on the medication used.   It is recommended to sleep in a semi upright position for the first few days as the nerve block may cause shortness of breath when lying flat.         Dressings: Keep your dressing clean after surgery.  A waterproof adherent dressing will be placed over your incision at the conclusion of your surgery.  Change your dressing one week following surgery with the extra dressing provided.  Please inspect your incision for any surrounding redness or drainage when you change the dressing.  If necessary, you may clean the edges of the wound with soap & water.  Dry the area with a clean cloth.  Then, place a new dressing over your incision.  If the dressing becomes loose or saturated with blood or fluid, it may be replaced at any time. Please keep your incision covered until your first post-operative appointment. At your first post-op appointment, your dressing will be removed.   In most cases, the surgical incision will not need to be covered after two weeks.     Baths/Pools/Hot Tubs.  Please do not submerge your incision in a hot tub, pool, or bath until at least six weeks after surgery.  Make sure that your incision is healed with no remaining scab or drainage before submerging in water.     Compression Hose/FOREIGN hose: Compression hose/FOREIGN hose will be placed on your legs prior to surgery at the hospital.  Please keep these on for at least two days, as they help control leg swelling as well as reduce the risk of a blood clot.   You may remove the compression hose temporarily to shower.     Ice: Use ice or cold therapy to your shoulder as you feel necessary to help with the pain and swelling.  Typically, this is 20 minutes on and 20 minutes off for the first several days following surgery.   Cold therapy units can be used as directed.        Sling:  Please wear your sling when walking about and when sleeping.  You may carefully remove the sling when awake and seated.  Please support your forearm on a pillow when the sling is removed. You may remove the pillow portion of the sling after two weeks. If you have questions regarding how to wear sling or need a replacement, please contact [employer] @ [phone].     Activity:  You may remove your sling when awake and seated.  Please support your forearm on a pillow.  Once your sling is removed, you may move your elbow, wrist and hand as tolerated. Please do not lift anything heavier than a cup of coffee on your operative side.  Codman/pendulum exercises are encouraged 3-5 times a day. You will need to remove the sling to perform these exercises.     Pain Medication:  Take your pain medication as prescribed, only as needed.  Do not drive or operate machinery while taking narcotic pain medication.  You may resume anti-inflammatory medications any time after surgery. Please take medication with food to avoid stomach upset.     Aspirin:  Please take Aspirin 81 mg twice daily starting the morning after surgery and continue for 2 weeks, to help prevent a blood clot.     Driving:  Please arrange a ride to and from the hospital/surgery center on the day of surgery.   You may drive as soon as you are off pain medication and feel comfortable behind the wheel.     Physical Therapy:  Contact a physical therapy facility approved by your insurance plan to schedule your initial visits.  Typically, physical therapy is scheduled twice weekly to begin at approximately two weeks after surgery for primary total shoulder patients and six weeks after surgery for reverse total shoulder patients.     Follow-up:  Your first post-op appointment should be scheduled 10-14 days after surgery.  The details of your procedure and specifics of rehabilitation will be discussed.      Problems/concerns: If you have a problem or significant concern (unexpected pain, excessive bleeding or discharge from your incisions, fever or chills) or do not hesitate to call the office @ [phone] or go to your local emergency room.       Montserrat Varghese MD     [facility]   [address], [address]   [place], [place] [address]     Office telephone:  [phone]   Office fax: [phone]    If any questions arise, call your provider.  If your provider is not available, please feel free to call the Surgical Center at [phone].    MEDICATIONS: Resume taking daily medication.  Take prescribed pain medication with food.  If no medication is prescribed, you may take non-aspirin pain medication if needed.  PAIN MEDICATION CAN BE VERY CONSTIPATING.  Take a stool softener or laxative such as senokot, pericolace, or milk of magnesia if needed.    Last pain medication given at N/A    What to Expect Post Anesthesia    Rest and take it easy for the first 24 hours.  A responsible adult is recommended to remain with you during that time.  It is normal to feel sleepy.  We encourage you to not do anything that requires balance, judgment or coordination.    FOR 24 HOURS DO NOT:  Drive, operate machinery or run household appliances.  Drink beer or alcoholic beverages.  Make important decisions or sign legal documents.    To avoid nausea, slowly advance diet as tolerated, avoiding spicy or greasy foods for the first day.  Add more substantial food to your diet according to your provider's instructions.  Babies can be fed formula or breast milk as soon as they are hungry.  INCREASE FLUIDS AND FIBER TO AVOID CONSTIPATION.    MILD FLU-LIKE SYMPTOMS ARE NORMAL.  YOU MAY EXPERIENCE GENERALIZED MUSCLE ACHES, THROAT IRRITATION, HEADACHE AND/OR SOME NAUSEA.    IMPORTANT NOTE IF YOU RECEIVED EXPAREL:    The liposomal bupivacaine (Exparel) teal arm band is used as a visual queue to alert healthcare  professionals that you received a long-acting form of bupivacaine during your recent surgery. You should not receive additional local anesthetics (i.e. bupivacaine, lidocaine) for 96 hours after surgery. There may be situations where you are unable to communicate this information, so it is important for your safety to keep the teal arm band on for 96 hours (4 days) after surgery.

## 2023-11-07 NOTE — ANESTHESIA TIME REPORT
Anesthesia Start and Stop Event Times     Date Time Event    11/7/2023 0645 Ready for Procedure     0657 Anesthesia Start     0836 Anesthesia Stop        Responsible Staff  11/07/23    Name Role Begin End    Keith Canas M.D. Anesth 0657 0836        Overtime Reason:  no overtime (within assigned shift)    Comments:

## 2023-11-24 DIAGNOSIS — E11.9 CONTROLLED TYPE 2 DIABETES MELLITUS WITHOUT COMPLICATION, WITHOUT LONG-TERM CURRENT USE OF INSULIN (HCC): ICD-10-CM

## 2023-11-27 DIAGNOSIS — E11.9 CONTROLLED TYPE 2 DIABETES MELLITUS WITHOUT COMPLICATION, WITHOUT LONG-TERM CURRENT USE OF INSULIN (HCC): ICD-10-CM

## 2023-11-27 RX ORDER — EMPAGLIFLOZIN 10 MG/1
1 TABLET, FILM COATED ORAL DAILY
Qty: 90 TABLET | Refills: 3 | Status: SHIPPED | OUTPATIENT
Start: 2023-11-27 | End: 2023-11-28

## 2023-11-27 RX ORDER — PIOGLITAZONEHYDROCHLORIDE 30 MG/1
30 TABLET ORAL DAILY
Qty: 100 TABLET | Refills: 3 | Status: SHIPPED | OUTPATIENT
Start: 2023-11-27

## 2023-11-28 ENCOUNTER — TELEPHONE (OUTPATIENT)
Dept: ENDOCRINOLOGY | Facility: MEDICAL CENTER | Age: 68
End: 2023-11-28
Payer: MEDICARE

## 2023-11-28 DIAGNOSIS — E11.9 CONTROLLED TYPE 2 DIABETES MELLITUS WITHOUT COMPLICATION, WITHOUT LONG-TERM CURRENT USE OF INSULIN (HCC): ICD-10-CM

## 2023-11-28 RX ORDER — EMPAGLIFLOZIN 10 MG/1
1 TABLET, FILM COATED ORAL DAILY
Qty: 90 TABLET | Refills: 3 | Status: SHIPPED | OUTPATIENT
Start: 2023-11-28

## 2023-11-28 NOTE — TELEPHONE ENCOUNTER
Received PA request via [employer] for Jardiance 10mg tablets    $482.79-100t/100D  $101.03-30t/30D    Insurance:[facility]     Pharmacy on File  [facility] [id number]   [address] [address]  Phone: [phone]    Fax: [phone]     Is patient eligible to fill with [facility]? Yes    FA-Screen for FA

## 2023-12-04 ENCOUNTER — TELEPHONE (OUTPATIENT)
Dept: ENDOCRINOLOGY | Facility: MEDICAL CENTER | Age: 68
End: 2023-12-04
Payer: MEDICARE

## 2023-12-04 NOTE — TELEPHONE ENCOUNTER
VOICEMAIL  12/4   11:41 am  1. Caller Name: Fannie                      Call Back Number: advised to call patient at [phone]    2. Message: Fannie states she is calling for patient that a script was needed. No medications provided.    3. Patient approves office to leave a detailed voicemail/MyChart message: N\A

## 2023-12-06 ENCOUNTER — TELEPHONE (OUTPATIENT)
Dept: PHARMACY | Facility: MEDICAL CENTER | Age: 68
End: 2023-12-06

## 2023-12-06 NOTE — TELEPHONE ENCOUNTER
Semaglutide, 1 MG/DOSE, (OZEMPIC, 1 MG/DOSE,) 4 MG/3ML Solution Pen-injector    FA Renewal       obtained FA application form. need pt income and household size, today's date, need pt and provider signature.     Emailed designated liaisons FA form

## 2023-12-13 ENCOUNTER — TELEPHONE (OUTPATIENT)
Dept: PHARMACY | Facility: MEDICAL CENTER | Age: 68
End: 2023-12-13
Payer: MEDICARE

## 2023-12-13 NOTE — TELEPHONE ENCOUNTER
Patient called me back regarding his Re-Enrollment on PAP for OZEMPIC 1MG/3ML SOLUTION PEN I got his consent to start the application process and set up appointment for 12/14/23 at 1:30 pm for him to come in and provide Proof of Income and sign applications.     Spoke with patients wife listed under alternate Contact,she will have patient call me back to set up an appointment.    Tammie Munguia, Pharmacy Liaison/ RX Coordinator

## 2023-12-14 NOTE — TELEPHONE ENCOUNTER
Patient came into the clinic with current proof of income. PAP application was successfully completed and faxed to:     [email or web address] @ [phone] for Moe Munguia  RX Coordinator/Liaison

## 2023-12-30 DIAGNOSIS — E11.9 CONTROLLED TYPE 2 DIABETES MELLITUS WITHOUT COMPLICATION, WITHOUT LONG-TERM CURRENT USE OF INSULIN (HCC): ICD-10-CM

## 2023-12-31 RX ORDER — METFORMIN HYDROCHLORIDE 500 MG/1
1000 TABLET, EXTENDED RELEASE ORAL 2 TIMES DAILY
Qty: 400 TABLET | Refills: 3 | Status: SHIPPED | OUTPATIENT
Start: 2023-12-31

## 2024-01-06 NOTE — TELEPHONE ENCOUNTER
Successfully re-faxed 2024 re-enrollment PAP Application in its entirety to:  [email or web address] @ [phone] for Moe Munguia  RX Coordinator/Liaison

## 2024-01-22 NOTE — TELEPHONE ENCOUNTER
I called the patient at [phone] to advise him of the 2024 re-enrollment approval through [email or web address] for the Ozempic. There was no answer and I left a general voice message.    Thank you,  Debra Bryant, [facility]  Endocrinology Pharmacy Coordinator

## 2024-01-22 NOTE — TELEPHONE ENCOUNTER
OZEMPIC 1MG/3ML SOLUTION PEN     FA Renewal 2024    Called [employer] at  [phone] for FA status    FA approved until 12/31/2024- per automated system

## 2024-01-28 ASSESSMENT — PATIENT HEALTH QUESTIONNAIRE - PHQ9
2. FEELING DOWN, DEPRESSED, IRRITABLE, OR HOPELESS: NOT AT ALL
1. LITTLE INTEREST OR PLEASURE IN DOING THINGS: NOT AT ALL

## 2024-01-28 ASSESSMENT — ENCOUNTER SYMPTOMS: GENERAL WELL-BEING: GOOD

## 2024-01-28 ASSESSMENT — ACTIVITIES OF DAILY LIVING (ADL): BATHING_REQUIRES_ASSISTANCE: 0

## 2024-01-29 ENCOUNTER — OFFICE VISIT (OUTPATIENT)
Dept: FAMILY PLANNING/WOMEN'S HEALTH CLINIC | Facility: PHYSICIAN GROUP | Age: 69
End: 2024-01-29
Attending: FAMILY MEDICINE
Payer: MEDICARE

## 2024-01-29 ENCOUNTER — HOSPITAL ENCOUNTER (OUTPATIENT)
Facility: MEDICAL CENTER | Age: 69
End: 2024-01-29
Payer: MEDICARE

## 2024-01-29 VITALS
DIASTOLIC BLOOD PRESSURE: 82 MMHG | SYSTOLIC BLOOD PRESSURE: 126 MMHG | BODY MASS INDEX: 27.64 KG/M2 | HEIGHT: 66 IN | WEIGHT: 172 LBS

## 2024-01-29 DIAGNOSIS — E11.41 DIABETIC MONONEUROPATHY ASSOCIATED WITH TYPE 2 DIABETES MELLITUS (HCC): ICD-10-CM

## 2024-01-29 DIAGNOSIS — E11.69 DYSLIPIDEMIA ASSOCIATED WITH TYPE 2 DIABETES MELLITUS (HCC): ICD-10-CM

## 2024-01-29 DIAGNOSIS — E03.9 HYPOTHYROIDISM (ACQUIRED): Chronic | ICD-10-CM

## 2024-01-29 DIAGNOSIS — F10.20 UNCOMPLICATED ALCOHOL DEPENDENCE (HCC): Chronic | ICD-10-CM

## 2024-01-29 DIAGNOSIS — F17.210 CIGARETTE NICOTINE DEPENDENCE WITHOUT COMPLICATION: Chronic | ICD-10-CM

## 2024-01-29 DIAGNOSIS — E78.5 DYSLIPIDEMIA ASSOCIATED WITH TYPE 2 DIABETES MELLITUS (HCC): ICD-10-CM

## 2024-01-29 DIAGNOSIS — E55.9 VITAMIN D DEFICIENCY: Chronic | ICD-10-CM

## 2024-01-29 LAB — AMBIGUOUS DTTM AMBI4: NORMAL

## 2024-01-29 PROCEDURE — 3074F SYST BP LT 130 MM HG: CPT

## 2024-01-29 PROCEDURE — 82570 ASSAY OF URINE CREATININE: CPT

## 2024-01-29 PROCEDURE — 1125F AMNT PAIN NOTED PAIN PRSNT: CPT

## 2024-01-29 PROCEDURE — G0439 PPPS, SUBSEQ VISIT: HCPCS

## 2024-01-29 PROCEDURE — 82043 UR ALBUMIN QUANTITATIVE: CPT

## 2024-01-29 PROCEDURE — 3079F DIAST BP 80-89 MM HG: CPT

## 2024-01-29 SDOH — ECONOMIC STABILITY: FOOD INSECURITY: WITHIN THE PAST 12 MONTHS, THE FOOD YOU BOUGHT JUST DIDN'T LAST AND YOU DIDN'T HAVE MONEY TO GET MORE.: NEVER TRUE

## 2024-01-29 SDOH — ECONOMIC STABILITY: FOOD INSECURITY: WITHIN THE PAST 12 MONTHS, YOU WORRIED THAT YOUR FOOD WOULD RUN OUT BEFORE YOU GOT MONEY TO BUY MORE.: NEVER TRUE

## 2024-01-29 SDOH — ECONOMIC STABILITY: INCOME INSECURITY: IN THE PAST 12 MONTHS, HAS THE ELECTRIC, GAS, OIL, OR WATER COMPANY THREATENED TO SHUT OFF SERVICE IN YOUR HOME?: NO

## 2024-01-29 SDOH — ECONOMIC STABILITY: INCOME INSECURITY: IN THE LAST 12 MONTHS, WAS THERE A TIME WHEN YOU WERE NOT ABLE TO PAY THE MORTGAGE OR RENT ON TIME?: NO

## 2024-01-29 SDOH — ECONOMIC STABILITY: INCOME INSECURITY: HOW HARD IS IT FOR YOU TO PAY FOR THE VERY BASICS LIKE FOOD, HOUSING, MEDICAL CARE, AND HEATING?: SOMEWHAT HARD

## 2024-01-29 SDOH — ECONOMIC STABILITY: HOUSING INSECURITY: IN THE LAST 12 MONTHS, HOW MANY PLACES HAVE YOU LIVED?: 1

## 2024-01-29 ASSESSMENT — PAIN SCALES - GENERAL: PAINLEVEL: 4=SLIGHT-MODERATE PAIN

## 2024-01-29 ASSESSMENT — PATIENT HEALTH QUESTIONNAIRE - PHQ9: CLINICAL INTERPRETATION OF PHQ2 SCORE: 0

## 2024-01-29 ASSESSMENT — FIBROSIS 4 INDEX: FIB4 SCORE: 0.86

## 2024-01-29 NOTE — ASSESSMENT & PLAN NOTE
Chronic, stable. The patient reports that he is smoking half a pack a day. Counseling provided.  The patient is not ready to quit at this time.  Follow-up at least annually.

## 2024-01-29 NOTE — ASSESSMENT & PLAN NOTE
Chronic, stable. Continue with current defined treatment plan: vitamin D (CHOLECALCIFEROL) 1000 UNIT Tab . Follow-up at least annually.

## 2024-01-29 NOTE — PROGRESS NOTES
Comprehensive Health Assessment Program     Kristian Frank is a 68 y.o. here for his comprehensive health assessment.    Patient Active Problem List    Diagnosis Date Noted    Atherosclerosis of both carotid arteries 07/14/2023    On economic assistance program 03/21/2023    Dyslipidemia associated with type 2 diabetes mellitus (HCC) 02/09/2023    Long term (current) use of oral hypoglycemic drugs 10/18/2022    Diabetic mononeuropathy associated with type 2 diabetes mellitus (HCC) 03/08/2022    Uncomplicated alcohol dependence (HCC) 05/08/2019    Left cervical radiculopathy 01/29/2018    Allergic rhinitis 04/18/2016    Chronic bilateral low back pain with right-sided sciatica 04/18/2016    Abnormal EKG 02/11/2016    Cigarette nicotine dependence without complication 02/11/2016    Sleep apnea 02/11/2016    Tenosynovitis of wrist 02/11/2016    Vitamin D deficiency disease 12/04/2012    Hypothyroidism (acquired)        Current Outpatient Medications   Medication Sig Dispense Refill    metFORMIN ER (GLUCOPHAGE XR) 500 MG TABLET SR 24 HR TAKE TWO TABLETS BY MOUTH TWICE A  Tablet 3    JARDIANCE 10 MG Tab tablet TAKE ONE TABLET BY MOUTH DAILY 90 Tablet 3    pioglitazone (ACTOS) 30 MG Tab TAKE ONE TABLET BY MOUTH DAILY 100 Tablet 3    naproxen (NAPROSYN) 500 MG Tab       atorvastatin (LIPITOR) 40 MG Tab TAKE ONE TABLET BY MOUTH DAILY (Patient taking differently: PM) 100 Tablet 2    levothyroxine (SYNTHROID) 137 MCG Tab Take 1 Tablet by mouth every morning on an empty stomach. (Patient taking differently: Take 137 mcg by mouth every morning on an empty stomach. No Monday or Thursday doses) 90 Tablet 2    Semaglutide, 1 MG/DOSE, (OZEMPIC, 1 MG/DOSE,) 4 MG/3ML Solution Pen-injector Inject 1 mg under the skin every 7 days. 3 mL 11    cyanocobalamin (VITAMIN B12) 1000 MCG Tab Take 1 tablet by mouth every day. 30 tablet 11    FREESTYLE TEST STRIPS strip USE ONE NEW TEST STRIP EACH TIME TO TEST BLOOD SUGAR  TWO TIMES A DAY AND AS NEEDED IF SYMPTOMS FOR HIGH OR LOW BLOOD SUGAR 100 Strip 0    Lancets Use one Freestyle lancet to test blood sugar twice daily. 100 Each 4    Blood Glucose Meter Kit Test blood sugar as recommended by provider. Freestyle blood glucose monitoring kit. 1 Kit 0    vitamin D (CHOLECALCIFEROL) 1000 UNIT Tab Take 3 Tabs by mouth every day. 60 Tab      No current facility-administered medications for this visit.          Current supplements as per medication list.     Allergies:   Patient has no known allergies.  Social History     Tobacco Use    Smoking status: Every Day     Current packs/day: 0.50     Average packs/day: 0.5 packs/day for 28.0 years (14.0 ttl pk-yrs)     Types: Cigarettes    Smokeless tobacco: Never   Vaping Use    Vaping Use: Never used   Substance Use Topics    Alcohol use: Yes     Alcohol/week: 8.4 - 12.6 oz     Types: 14 - 21 Cans of beer per week     Comment: 2-3 a day    Drug use: Yes     Frequency: 4.0 times per week     Types: Marijuana, Inhaled     Family History   Problem Relation Age of Onset    Diabetes Mother     Hypertension Mother     Hyperlipidemia Mother     Dementia Mother     Heart Disease Father     Hypertension Father     Hyperlipidemia Father     Heart Attack Father     Diabetes Sister     Hyperlipidemia Sister     Hypertension Sister     Diabetes Sister         type 2    Cancer Sister         Lung cancer    Lung Disease Sister     Diabetes Brother         Type 2    Hypertension Brother     Hyperlipidemia Brother      Kristian  has a past medical history of Back pain, DIABETES MELLITUS, Diverticulitis, Dyslipidemia, GOUT, High cholesterol, Hyperlipidemia, Hypothyroid, Pneumonia, Post-nasal drip, and Rhinitis.   Past Surgical History:   Procedure Laterality Date    PB RECONSTR TOTAL SHOULDER IMPLANT Right 11/7/2023    Procedure: RIGHT TOTAL SHOULDER ARTHROPLASTY AND REPAIRS AS INDICATED;  Surgeon: Montserrat Varghese M.D.;  Location: SURGERY [facility];   Service: Orthopedics    APPENDECTOMY      COLON RESECTION         Screening:  In the last six months have you experienced any leakage of urine? No    Depression Screening  Little interest or pleasure in doing things?  0 - not at all  Feeling down, depressed , or hopeless? 0 - not at all  Patient Health Questionnaire Score: 0     If depressive symptoms identified deferred to follow up visit unless specifically addressed in assessment and plan.    Interpretation of PHQ-9 Total Score   Score Severity   1-4 No Depression   5-9 Mild Depression   10-14 Moderate Depression   15-19 Moderately Severe Depression   20-27 Severe Depression    Screening for Cognitive Impairment  Do you or any of your friends or family members have any concern about your memory? Yes  Three Minute Recall (Banana, Sunrise, Chair) 2/3    Finn clock face with all 12 numbers and set the hands to show 20 past 8.  Yes 5/5  Cognitive concerns identified deferred for follow up unless specifically addressed in assessment and plan.    Fall Risk Assessment  Has the patient had two or more falls in the last year or any fall with injury in the last year?  No    Safety Assessment  Do you always wear your seatbelt?  Yes  Any changes to home needed to function safely? No  Difficulty hearing.  No  Patient counseled about all safety risks that were identified.    Functional Assessment ADLs  Are there any barriers preventing you from cooking for yourself or meeting nutritional needs?  No.    Are there any barriers preventing you from driving safely or obtaining transportation?  No.    Are there any barriers preventing you from using a telephone or calling for help?  No    Are there any barriers preventing you from shopping?  No.    Are there any barriers preventing you from taking care of your own finances?  No    Are there any barriers preventing you from managing your medications?  No    Are there any barriers preventing you from showering, bathing or dressing  yourself? No    Are there any barriers preventing you from doing housework or laundry? No  Are there any barriers preventing you from using the toilet?No  Are you currently engaging in any exercise or physical activity?  Yes.      Self-Assessment of Health  What is your perception of your health? Good  Do you sleep more than six hours a night? Yes  In the past 7 days, how much did pain keep you from doing your normal work? Some  Do you spend quality time with family or friends (virtually or in person)? Yes  Do you usually eat a heart healthy diet that constists of a variety of fruits, vegetables, whole grains and fiber? Yes  Do you eat foods high in fat and/or Fast Food more than three times per week? No    Advance Care Planning  Do you have an Advance Directive, Living Will, Durable Power of , or POLST? Yes  Advance Directive       is on file      Health Maintenance Summary            Overdue - Diabetes: Retinopathy Screening (Yearly) Overdue since 10/25/2023      10/25/2022  RETINAL SCREENING RESULTS    10/18/2022  POCT Retinal Eye Exam    02/18/2021  Done    02/18/2020  REFERRAL FOR RETINAL SCREENING EXAM    02/15/2019  REFERRAL FOR RETINAL SCREENING EXAM    Only the first 5 history entries have been loaded, but more history exists.              A1c Screening (Every 6 Months) Next due on 3/5/2024      09/05/2023  POCT Hemoglobin A1C    03/21/2023  POCT Hemoglobin A1C    10/18/2022  POCT Hemoglobin A1C    02/23/2022  POCT Hemoglobin A1C    10/11/2021  POCT Hemoglobin A1C    Only the first 5 history entries have been loaded, but more history exists.              Ordered - Fasting Lipid Profile (Yearly) Ordered on 9/5/2023      03/15/2023  Lipid Profile    02/16/2022  Lipid Profile    02/03/2021  Lipid Profile    11/04/2020  Lipid Profile    06/19/2020  Lipid Profile    Only the first 5 history entries have been loaded, but more history exists.              Ordered - Diabetes: Urine Protein Screening  (Yearly) Ordered on 1/29/2024      03/15/2023  MICROALBUMIN CREAT RATIO URINE    02/16/2022  MICROALBUMIN CREAT RATIO URINE    06/10/2021  MICROALBUMIN CREAT RATIO URINE    06/19/2020  MICROALBUMIN CREAT RATIO URINE    06/19/2020  MICROALBUMIN CREAT RATIO URINE (LAB COLLECT)    Only the first 5 history entries have been loaded, but more history exists.              Diabetes: Monofilament / LE Exam (Yearly) Tentatively due on 9/5/2024 09/05/2023  [employer]: WORKFLOW - DIABETES - DIABETIC FOOT EXAM PERFORMED    09/05/2023  Diabetic Monofilament LE Exam    10/18/2022  Diabetic Monofilament LE Exam    10/11/2021  Diabetic Monofilament LE Exam    06/14/2021  Diabetic Monofilament LE Exam    Only the first 5 history entries have been loaded, but more history exists.              SERUM CREATININE (Yearly) Next due on 10/25/2024      10/25/2023  Comp Metabolic Panel    08/31/2023  Comp Metabolic Panel    03/15/2023  Comp Metabolic Panel    02/16/2022  Comp Metabolic Panel    02/03/2021  Comp Metabolic Panel    Only the first 5 history entries have been loaded, but more history exists.              Annual Wellness Visit (Yearly) Next due on 1/29/2025 01/29/2024  Level of Service: [place] ANNUAL WELLNESS VISIT-INCLUDES PPPS SUBSEQUE*    07/14/2023  Level of Service: [place] ANNUAL WELLNESS VISIT-INCLUDES PPPS SUBSEQUE*    07/14/2023  Visit Dx: Medicare annual wellness visit, subsequent    07/14/2023  Subsequent Annual Wellness Visit - Includes PPPS ()    02/09/2023  Level of Service: [place] ANNUAL WELLNESS VISIT-INCLUDES PPPS SUBSEQUE*    Only the first 5 history entries have been loaded, but more history exists.              Colorectal Cancer Screening (Colonoscopy - Every 3 Years) Tentatively due on 8/31/2026 08/31/2023  AMB EXTERNAL COLONOSCOPY RESULTS    08/29/2023  AMB EXTERNAL COLONOSCOPY RESULTS    08/12/2020  REFERRAL TO GI FOR COLONOSCOPY    08/05/2015  AMB REFERRAL TO GI FOR COLONOSCOPY              IMM  DTaP/Tdap/Td Vaccine (3 - Td or Tdap) Next due on 2/1/2033 02/01/2023  Imm Admin: Tdap Vaccine    11/08/2013  Imm Admin: Tdap Vaccine              Hepatitis A Vaccine (Hep A) (Series Information) Aged Out      02/13/2019  Imm Admin: Hep A/HEP B Combined Vaccine (TwinRix)              Zoster (Shingles) Vaccines (Series Information) Completed      07/01/2019  Imm Admin: Zoster Vaccine Recombinant (RZV) (SHINGRIX)    02/13/2019  Imm Admin: Zoster Vaccine Recombinant (RZV) (SHINGRIX)              Hepatitis C Screening  Tentatively Complete      06/19/2020  Hepatitis C Antibody component of HEP C VIRUS ANTIBODY              Pneumococcal Vaccine: 65+ Years (Series Information) Completed      07/28/2022  Imm Admin: Pneumococcal Conjugate Vaccine (PCV20)    05/06/2019  Imm Admin: Pneumococcal Conjugate Vaccine (Prevnar/PCV-13)    10/17/2017  Imm Admin: Pneumococcal polysaccharide vaccine (PPSV-23)    07/18/2016  Imm Admin: Pneumococcal polysaccharide vaccine (PPSV-23)              Abdominal Aortic Aneurysm (AAA) Screening  Tentatively Complete      08/30/2022  US-ABDOMINAL AORTA W/O DOPPLER              Influenza Vaccine (Series Information) Completed      10/09/2023  Outside Immunization: Fluzone High-Dose Quad    12/27/2022  Imm Admin: Influenza, Unspecified - HISTORICAL DATA    10/25/2021  Imm Admin: Influenza Vaccine Adult HD    11/20/2020  Imm Admin: Influenza Vaccine Adult HD    10/01/2019  Imm Admin: Influenza Vaccine Adult HD    Only the first 5 history entries have been loaded, but more history exists.              COVID-19 Vaccine (Series Information) Completed      10/09/2023  Outside Immunization: COVID-19(MOD) 12yrs \T\ up    12/27/2022  Imm Admin: MODERNA BIVALENT BOOSTER SARS-COV-2 VACCINE (6+)    04/29/2022  Imm Admin: MODERNA SARS-COV-2 VACCINE (12+)    11/23/2021  Imm Admin: MODERNA SARS-COV-2 VACCINE (12+)    04/10/2021  Imm Admin: MODERNA SARS-COV-2 VACCINE (12+)    Only the first 5 history entries  have been loaded, but more history exists.              HPV Vaccines (Series Information) Aged Out      No completion history exists for this topic.              Polio Vaccine (Inactivated Polio) (Series Information) Aged Out      No completion history exists for this topic.              Meningococcal Immunization (Series Information) Aged Out      No completion history exists for this topic.              Discontinued - Prostate Specific Antigen (PSA) Screening  Discontinued        Frequency changed to Never automatically (Topic No Longer Applies)    06/19/2020  Prostatic Specific Antigen Tot component of PROSTATE SPECIFIC AG SCREENING    05/10/2019  Prostatic Specific Antigen Tot component of PROSTATE SPECIFIC AG SCREENING    06/26/2018  Prostatic Specific Antigen Tot component of PROSTATE SPECIFIC AG SCREENING              Discontinued - Hepatitis B Vaccine (Hep B)  Discontinued      02/13/2019  Imm Admin: Hep A/HEP B Combined Vaccine (TwinRix)                    Patient Care Team:  TUNG HernandezRYOSHI as PCP - General (Nurse Practitioner)  LEON Hernandez as PCP - [facility] Paneled  Tello Fuller O.D. as Consulting Physician (Optometry)  Jason Holbrook P.A.-C. (Endocrinology)  Tsering Rodriguez as    Lester Durham M.D. (Orthopedic Surgery)  TUNG SiddiqiRYOSHI (Nurse Practitioner Family)  Rony Nettles M.D. (Geriatrics)      Financial Resource Strain: Medium Risk (1/29/2024)    Overall Financial Resource Strain (CARDIA)     Difficulty of Paying Living Expenses: Somewhat hard      Transportation Needs: No Transportation Needs (1/29/2024)    PRAPARE - Transportation     Lack of Transportation (Medical): No     Lack of Transportation (Non-Medical): No      Food Insecurity: No Food Insecurity (1/29/2024)    Hunger Vital Sign     Worried About Running Out of Food in the Last Year: Never true     Ran Out of Food in the Last Year: Never true       "  Encounter Vitals  Blood Pressure : 126/82  Weight: 78 kg (172 lb)  Height: 167.6 cm (5' 6\")  BMI (Calculated): 27.76  Pain Score: 4=Slight-Moderate Pain     Alert, oriented in no acute distress.  Eye contact is good, speech goal directed, affect calm.    Assessment and Plan. The following treatment and monitoring plan is recommended:  Hypothyroidism (acquired)  Chronic, stable. The patient denies any symptoms at this time. Continue with current defined treatment plan: levothyroxine (SYNTHROID) 137 MCG Tab . Follow-up at least annually.      Dyslipidemia associated with type 2 diabetes mellitus (HCC)  Chronic, stable.  The patient denies any side effects from his medications.  Continue with current defined treatment plan: atorvastatin (LIPITOR) 40 MG Tab, pioglitazone (ACTOS) 30 MG Tab, Semaglutide, 1 MG/DOSE, (OZEMPIC, 1 MG/DOSE,) 4 MG/3ML Solution Pen-injector, JARDIANCE 10 MG Tab tablet    and metFORMIN ER (GLUCOPHAGE XR) 500 MG TABLET SR 24 HR . Follow-up at least annually.      Diabetic mononeuropathy associated with type 2 diabetes mellitus (HCC)  Chronic, stable. The patient reports that he has neuropathy in his right foot.  Follow-up at least annually.      Uncomplicated alcohol dependence (HCC)  Chronic, stable. The patient reports that he drinks 2 beers per day. No current treatment.  Follow-up at least annually.      Cigarette nicotine dependence without complication  Chronic, stable. The patient reports that he is smoking half a pack a day. Counseling provided.  The patient is not ready to quit at this time.  Follow-up at least annually.      Vitamin D deficiency disease  Chronic, stable. Continue with current defined treatment plan: vitamin D (CHOLECALCIFEROL) 1000 UNIT Tab . Follow-up at least annually.      Services suggested: No services needed at this time  Health Care Screening: Age-appropriate preventive services recommended by USPTF and ACIP covered by Medicare were discussed today. Services " ordered if indicated and agreed upon by the patient.  Referrals offered: Community-based lifestyle interventions to reduce health risks and promote self-management and wellness, fall prevention, nutrition, physical activity, tobacco-use cessation, weight loss, and mental health services as per orders if indicated.    Discussion today about general wellness and lifestyle habits:    Prevent falls and reduce trip hazards; Cautioned about securing or removing rugs.  Have a working fire alarm and carbon monoxide detector.  Engage in regular physical activity and social activities.    Follow-up: No follow-ups on file.

## 2024-01-29 NOTE — ASSESSMENT & PLAN NOTE
Chronic, stable.  The patient denies any side effects from his medications.  Continue with current defined treatment plan: atorvastatin (LIPITOR) 40 MG Tab, pioglitazone (ACTOS) 30 MG Tab, Semaglutide, 1 MG/DOSE, (OZEMPIC, 1 MG/DOSE,) 4 MG/3ML Solution Pen-injector, JARDIANCE 10 MG Tab tablet    and metFORMIN ER (GLUCOPHAGE XR) 500 MG TABLET SR 24 HR . Follow-up at least annually.

## 2024-01-29 NOTE — ASSESSMENT & PLAN NOTE
Chronic, stable. The patient denies any symptoms at this time. Continue with current defined treatment plan: levothyroxine (SYNTHROID) 137 MCG Tab . Follow-up at least annually.

## 2024-01-29 NOTE — ASSESSMENT & PLAN NOTE
Chronic, stable. The patient reports that he drinks 2 beers per day. No current treatment.  Follow-up at least annually.

## 2024-01-29 NOTE — ASSESSMENT & PLAN NOTE
Chronic, stable. The patient reports that he has neuropathy in his right foot.  Follow-up at least annually.

## 2024-01-30 ENCOUNTER — TELEPHONE (OUTPATIENT)
Dept: ENDOCRINOLOGY | Facility: MEDICAL CENTER | Age: 69
End: 2024-01-30
Payer: MEDICARE

## 2024-01-30 LAB
CREAT UR-MCNC: 91.48 MG/DL
MICROALBUMIN UR-MCNC: <1.2 MG/DL
MICROALBUMIN/CREAT UR: NORMAL MG/G (ref 0–30)

## 2024-01-31 NOTE — TELEPHONE ENCOUNTER
Called patient at [phone] and left a voice message regarding PAP Pick-up (Ozempic).    Thank you,  Debra Bryant, [facility]  Endocrinology Pharmacy Coordinator

## 2024-02-29 ENCOUNTER — HOSPITAL ENCOUNTER (OUTPATIENT)
Dept: LAB | Facility: MEDICAL CENTER | Age: 69
End: 2024-02-29
Attending: INTERNAL MEDICINE
Payer: MEDICARE

## 2024-02-29 DIAGNOSIS — E03.9 HYPOTHYROIDISM (ACQUIRED): ICD-10-CM

## 2024-02-29 DIAGNOSIS — E55.9 VITAMIN D DEFICIENCY: ICD-10-CM

## 2024-02-29 DIAGNOSIS — E11.9 TYPE 2 DIABETES MELLITUS WITH HEMOGLOBIN A1C GOAL OF LESS THAN 7.0% (HCC): ICD-10-CM

## 2024-02-29 DIAGNOSIS — E78.5 DYSLIPIDEMIA: ICD-10-CM

## 2024-02-29 LAB
25(OH)D3 SERPL-MCNC: 71 NG/ML (ref 30–100)
ALBUMIN SERPL BCP-MCNC: 4.3 G/DL (ref 3.2–4.9)
ALBUMIN/GLOB SERPL: 1.8 G/DL
ALP SERPL-CCNC: 59 U/L (ref 30–99)
ALT SERPL-CCNC: 12 U/L (ref 2–50)
ANION GAP SERPL CALC-SCNC: 12 MMOL/L (ref 7–16)
AST SERPL-CCNC: 14 U/L (ref 12–45)
BILIRUB SERPL-MCNC: 0.5 MG/DL (ref 0.1–1.5)
BUN SERPL-MCNC: 17 MG/DL (ref 8–22)
CALCIUM ALBUM COR SERPL-MCNC: 9.2 MG/DL (ref 8.5–10.5)
CALCIUM SERPL-MCNC: 9.4 MG/DL (ref 8.5–10.5)
CHLORIDE SERPL-SCNC: 102 MMOL/L (ref 96–112)
CHOLEST SERPL-MCNC: 129 MG/DL (ref 100–199)
CO2 SERPL-SCNC: 25 MMOL/L (ref 20–33)
CREAT SERPL-MCNC: 0.9 MG/DL (ref 0.5–1.4)
CREAT UR-MCNC: 73.45 MG/DL
FASTING STATUS PATIENT QL REPORTED: NORMAL
GFR SERPLBLD CREATININE-BSD FMLA CKD-EPI: 92 ML/MIN/1.73 M 2
GLOBULIN SER CALC-MCNC: 2.4 G/DL (ref 1.9–3.5)
GLUCOSE SERPL-MCNC: 125 MG/DL (ref 65–99)
HDLC SERPL-MCNC: 51 MG/DL
LDLC SERPL CALC-MCNC: 63 MG/DL
MICROALBUMIN UR-MCNC: <1.2 MG/DL
MICROALBUMIN/CREAT UR: NORMAL MG/G (ref 0–30)
POTASSIUM SERPL-SCNC: 4.7 MMOL/L (ref 3.6–5.5)
PROT SERPL-MCNC: 6.7 G/DL (ref 6–8.2)
SODIUM SERPL-SCNC: 139 MMOL/L (ref 135–145)
T3FREE SERPL-MCNC: 2.73 PG/ML (ref 2–4.4)
T4 FREE SERPL-MCNC: 1.65 NG/DL (ref 0.93–1.7)
TRIGL SERPL-MCNC: 75 MG/DL (ref 0–149)
TSH SERPL DL<=0.005 MIU/L-ACNC: 0.02 UIU/ML (ref 0.38–5.33)

## 2024-02-29 PROCEDURE — 82306 VITAMIN D 25 HYDROXY: CPT

## 2024-02-29 PROCEDURE — 80053 COMPREHEN METABOLIC PANEL: CPT

## 2024-02-29 PROCEDURE — 84439 ASSAY OF FREE THYROXINE: CPT

## 2024-02-29 PROCEDURE — 80061 LIPID PANEL: CPT

## 2024-02-29 PROCEDURE — 82043 UR ALBUMIN QUANTITATIVE: CPT

## 2024-02-29 PROCEDURE — 84481 FREE ASSAY (FT-3): CPT

## 2024-02-29 PROCEDURE — 36415 COLL VENOUS BLD VENIPUNCTURE: CPT

## 2024-02-29 PROCEDURE — 82570 ASSAY OF URINE CREATININE: CPT

## 2024-02-29 PROCEDURE — 84443 ASSAY THYROID STIM HORMONE: CPT

## 2024-03-04 ENCOUNTER — RESEARCH ENCOUNTER (OUTPATIENT)
Dept: RESEARCH | Facility: MEDICAL CENTER | Age: 69
End: 2024-03-04
Payer: MEDICARE

## 2024-03-07 ENCOUNTER — RESEARCH ENCOUNTER (OUTPATIENT)
Dept: ENDOCRINOLOGY | Facility: MEDICAL CENTER | Age: 69
End: 2024-03-07
Attending: INTERNAL MEDICINE
Payer: MEDICARE

## 2024-03-07 VITALS
WEIGHT: 175 LBS | HEIGHT: 66 IN | HEART RATE: 84 BPM | BODY MASS INDEX: 28.12 KG/M2 | SYSTOLIC BLOOD PRESSURE: 108 MMHG | DIASTOLIC BLOOD PRESSURE: 70 MMHG | OXYGEN SATURATION: 97 %

## 2024-03-07 DIAGNOSIS — Z00.6 RESEARCH STUDY PATIENT: ICD-10-CM

## 2024-03-07 DIAGNOSIS — E03.9 HYPOTHYROIDISM (ACQUIRED): ICD-10-CM

## 2024-03-07 DIAGNOSIS — E11.9 CONTROLLED TYPE 2 DIABETES MELLITUS WITHOUT COMPLICATION, WITHOUT LONG-TERM CURRENT USE OF INSULIN (HCC): ICD-10-CM

## 2024-03-07 DIAGNOSIS — Z79.84 LONG TERM (CURRENT) USE OF ORAL HYPOGLYCEMIC DRUGS: ICD-10-CM

## 2024-03-07 DIAGNOSIS — E78.5 DYSLIPIDEMIA: ICD-10-CM

## 2024-03-07 DIAGNOSIS — E55.9 VITAMIN D DEFICIENCY: ICD-10-CM

## 2024-03-07 LAB
HBA1C MFR BLD: 6.3 % (ref ?–5.8)
POCT INT CON NEG: NEGATIVE
POCT INT CON POS: POSITIVE

## 2024-03-07 PROCEDURE — 3074F SYST BP LT 130 MM HG: CPT | Performed by: INTERNAL MEDICINE

## 2024-03-07 PROCEDURE — 99212 OFFICE O/P EST SF 10 MIN: CPT | Performed by: INTERNAL MEDICINE

## 2024-03-07 PROCEDURE — 99214 OFFICE O/P EST MOD 30 MIN: CPT | Performed by: INTERNAL MEDICINE

## 2024-03-07 PROCEDURE — 83036 HEMOGLOBIN GLYCOSYLATED A1C: CPT | Performed by: INTERNAL MEDICINE

## 2024-03-07 PROCEDURE — 3078F DIAST BP <80 MM HG: CPT | Performed by: INTERNAL MEDICINE

## 2024-03-07 RX ORDER — LEVOTHYROXINE SODIUM 88 UG/1
88 TABLET ORAL
Qty: 90 TABLET | Refills: 3 | Status: SHIPPED | OUTPATIENT
Start: 2024-03-07

## 2024-03-07 ASSESSMENT — FIBROSIS 4 INDEX: FIB4 SCORE: 0.99

## 2024-03-07 NOTE — PROGRESS NOTES
Monofilament testing with a 10 gram force: sensation intact: decreased on right  Visual Inspection: Feet without maceration, ulcers, fissures.  Pedal pulses: intact bilaterally

## 2024-03-07 NOTE — RESEARCH NOTE
Confirmed with the participant which designated provider they would like study results shared with. Patient will have an opportunity to share the results with any providers of their choosing in the future by accessing their results from [employer]

## 2024-03-07 NOTE — PROGRESS NOTES
CHIEF COMPLAINT: Patient is here for follow up of Type 2 Diabetes Mellitus    HPI:     Kristian Frank is a 68 y.o. male with Type 2 Diabetes Mellitus here for follow up.    Labs from 3/7/2024 show A1c is 6.4%  Labs from 9/5/2023 show A1c was 6.2%  Labs from 3/2/1/2023 show A1c was 6.4%  Labs from 10/18/2022 show A1c was 6.7%    He was previously seen by the nurse practitioner   Comorbid issues include hypothyroidism, hyperlipidemia  He denies a history of coronary artery disease and cerebrovascular disease        He is on  Metformin extended release 1000 mg twice a day  Actos 30 mg daily  Jardiance 10 mg daily  Ozempic 1 mg weekly  (PAP)      He denies SE with his medications  He had R shoulder replacement on 11/7/2023 and is done with PT  He is feeling good      He has hyperlipidemia  He is taking Atorvastatin 40 mg daily  He denies muscle aches and cramps   No leg weakness  He denies a hx CAD and CVA   Latest Reference Range & Units 02/29/24 07:48   Cholesterol,Tot 100 - 199 mg/dL 129   Triglycerides 0 - 149 mg/dL 75   HDL >=40 mg/dL 51   LDL <100 mg/dL 63           He doesn't have diabetic kidney disease   Latest Reference Range & Units 02/29/24 07:47   Micro Alb Creat Ratio 0 - 30 mg/g see below   Creatinine, Urine mg/dL 73.45   Microalbumin, Urine Random mg/dL <1.2         He is overdue for an eye exam - he sees Dr. Fuller   But we dont have records      He has hypothyroidism and is taking Levothyroxine 137mcg daily 5 days a week   He denies palpitations and tremors   His TSH is now low   He denies palpitations and tremors    Latest Reference Range & Units 02/29/24 07:48   TSH 0.380 - 5.330 uIU/mL 0.020 (L)   Free T-4 0.93 - 1.70 ng/dL 1.65   T3,Free 2.00 - 4.40 pg/mL 2.73         BG Diary:  Patient did not bring his glucose meter    Weight has been stable    Diabetes Complications   Retinopathy: No known retinopathy.  Last eye exam: WE are getting records  Neuropathy: Denies paresthesias or numbness in  hands or feet. Denies any foot wounds.  Exercise: Minimal.  Diet: Fair.  Patient's medications, allergies, and social histories were reviewed and updated as appropriate.    ROS:     CONS:     No fever, no chills   EYES:     No diplopia, no blurry vision   CV:           No chest pain, no palpitations   PULM:     No SOB, no cough, no hemoptysis.   GI:            No nausea, no vomiting, no diarrhea, no constipation   ENDO:     No polyuria, no polydipsia, no heat intolerance, no cold intolerance       Past Medical History:  Problem List:  2023-07: Atherosclerosis of both carotid arteries  2023-03: On economic assistance program  2023-02: Dyslipidemia associated with type 2 diabetes mellitus ([facility])  2022-10: Long term (current) use of oral hypoglycemic drugs  2022-03: BMI 29.0-29.9,adult  2022-03: Mild cognitive impairment  2022-03: Diabetic mononeuropathy associated with type 2 diabetes   mellitus ([facility])  2020-05: Obesity (BMI 30-39.9)  2019-05: Uncomplicated alcohol dependence ([facility])  2019-05: Essential hypertension  2018-06: Obesity (BMI 30-39.9)  2018-03: Carotid atherosclerosis  2018-01: Left cervical radiculopathy  2018-01: Left hand weakness  2016-04: Allergic rhinitis  2016-04: Post-nasal drip  2016-04: Chronic bilateral low back pain with right-sided sciatica  2016-02: Abnormal EKG  2016-02: Cigarette nicotine dependence without complication  2016-02: Obesity (BMI 30.0-34.9)  2016-02: Epigastric abdominal pain  2016-02: Sleep apnea  2016-02: Tenosynovitis of wrist  2013-10: Type 2 diabetes mellitus with hemoglobin A1c goal of less   than 7.0% ([facility])  2012-12: Vitamin D deficiency disease  Dyslipidemia  DIABETES MELLITUS  Hypothyroidism (acquired)  Diverticulitis  Controlled type 2 diabetes mellitus with diabetic mononeuropathy,   without long-term current use of insulin ([facility])      Past Surgical History:  Past Surgical History:   Procedure Laterality Date    PB RECONSTR TOTAL SHOULDER IMPLANT Right 11/7/2023     "Procedure: RIGHT TOTAL SHOULDER ARTHROPLASTY AND REPAIRS AS INDICATED;  Surgeon: Montserrat Varghese M.D.;  Location: SURGERY [facility];  Service: Orthopedics    APPENDECTOMY      COLON RESECTION          Allergies:  Patient has no known allergies.     Social History:  Social History     Tobacco Use    Smoking status: Every Day     Current packs/day: 0.50     Average packs/day: 0.5 packs/day for 28.0 years (14.0 ttl pk-yrs)     Types: Cigarettes    Smokeless tobacco: Never   Vaping Use    Vaping Use: Never used   Substance Use Topics    Alcohol use: Yes     Alcohol/week: 8.4 - 12.6 oz     Types: 14 - 21 Cans of beer per week     Comment: 2-3 a day    Drug use: Yes     Frequency: 4.0 times per week     Types: Marijuana, Inhaled        Family History:   family history includes Cancer in his sister; Dementia in his mother; Diabetes in his brother, mother, sister, and sister; Heart Attack in his father; Heart Disease in his father; Hyperlipidemia in his brother, father, mother, and sister; Hypertension in his brother, father, mother, and sister; Lung Disease in his sister.      PHYSICAL EXAM:   OBJECTIVE:  Vital signs: /70   Pulse 84   Ht 1.676 m (5' 6\")   Wt 79.4 kg (175 lb)   SpO2 97%   BMI 28.25 kg/m²   GENERAL: Well-developed, well-nourished in no apparent distress.   EYE:  No ocular asymmetry, PERRLA  HENT: Pink, moist mucous membranes.    NECK: No thyromegaly.   CARDIOVASCULAR:  No murmurs  LUNGS: Clear breath sounds  ABDOMEN: Soft, nontender   EXTREMITIES: No clubbing, cyanosis, or edema.   NEUROLOGICAL: No gross focal motor abnormalities   LYMPH: No cervical adenopathy palpated.   SKIN: No rashes, lesions.     Labs:  Lab Results   Component Value Date/Time    HBA1C 6.3 (A) 03/07/2024 02:40 PM        Lab Results   Component Value Date/Time    WBC 6.9 10/25/2023 01:48 PM    RBC 4.86 10/25/2023 01:48 PM    HEMOGLOBIN 15.4 10/25/2023 01:48 PM    MCV 93.2 10/25/2023 01:48 PM    MCH 31.7 10/25/2023 01:48 " PM    MCHC 34.0 10/25/2023 01:48 PM    RDW 48.6 10/25/2023 01:48 PM    MPV 10.9 10/25/2023 01:48 PM       Lab Results   Component Value Date/Time    SODIUM 139 02/29/2024 07:48 AM    POTASSIUM 4.7 02/29/2024 07:48 AM    CHLORIDE 102 02/29/2024 07:48 AM    CO2 25 02/29/2024 07:48 AM    ANION 12.0 02/29/2024 07:48 AM    GLUCOSE 125 (H) 02/29/2024 07:48 AM    BUN 17 02/29/2024 07:48 AM    CREATININE 0.90 02/29/2024 07:48 AM    CREATININE 0.93 03/22/2013 07:21 AM    CALCIUM 9.4 02/29/2024 07:48 AM    ASTSGOT 14 02/29/2024 07:48 AM    ALTSGPT 12 02/29/2024 07:48 AM    TBILIRUBIN 0.5 02/29/2024 07:48 AM    ALBUMIN 4.3 02/29/2024 07:48 AM    TOTPROTEIN 6.7 02/29/2024 07:48 AM    GLOBULIN 2.4 02/29/2024 07:48 AM    AGRATIO 1.8 02/29/2024 07:48 AM       Lab Results   Component Value Date/Time    CHOLSTRLTOT 161 02/16/2022 0737    TRIGLYCERIDE 128 02/16/2022 0737    HDL 50 02/16/2022 0737    LDL 85 02/16/2022 0737       Lab Results   Component Value Date/Time    MICROALBCALC 5.9 10/16/2013 07:12 AM    MALBCRT see below 02/29/2024 07:47 AM    MICROALBUR <1.2 02/29/2024 07:47 AM    MICRALB 7.4 10/16/2013 07:12 AM        Lab Results   Component Value Date/Time    TSHULTRASEN 0.120 (L) 02/16/2022 0737     No results found for: FREEDIR  Lab Results   Component Value Date/Time    FREET3 3.12 02/16/2022 0737     No results found for: THYSTIMIG        ASSESSMENT/PLAN:     1. Controlled type 2 diabetes mellitus without complication, without long-term current use of insulin (HCC)  Controlled  Continue Ozempic 1 mg once a week   Continue metformin max dose  Continue Actos max dose  Continue Jardiance 10 mg daily  He is up-to-date with his labs  Follow-up with me in 6 months with repeat A1c      2. Hypothyroidism (acquired)  Uncontrolled  TSH is low  LT4 137 mcg 5 days a week is equal to 100mcg daily  Thus will lower dose to LT4 88 mcg daily  Stop LT4 137 5 days out 7   Start Levothyroxine 88mcg daily   Repeat thyroid labs in 6  months    3. Dyslipidemia  Controlled  Continue atorvastatin  Repeat fasting lipids in 12 months    4. Vitamin D deficiency  Stable   Vitamin D labs were reviewed with patient  Continue current supplements  Continue monitoring levels       5. Long term (current) use of oral hypoglycemic drugs  Patient is on multiple oral agents and a GLP-1 for type 2 diabetes management      Return in about 6 months (around 9/7/2024).      Thank you kindly for allowing me to participate in the diabetes care plan for this patient.    Kristian Jones MD, JC, [facility]      CC:   LEON Hernandez

## 2024-03-13 ENCOUNTER — HOSPITAL ENCOUNTER (OUTPATIENT)
Dept: LAB | Facility: MEDICAL CENTER | Age: 69
End: 2024-03-13
Attending: NURSE PRACTITIONER
Payer: MEDICARE

## 2024-03-13 DIAGNOSIS — Z00.6 RESEARCH STUDY PATIENT: ICD-10-CM

## 2024-03-14 ENCOUNTER — APPOINTMENT (RX ONLY)
Dept: URBAN - METROPOLITAN AREA CLINIC 22 | Facility: CLINIC | Age: 69
Setting detail: DERMATOLOGY
End: 2024-03-14

## 2024-03-14 DIAGNOSIS — Z71.89 OTHER SPECIFIED COUNSELING: ICD-10-CM

## 2024-03-14 DIAGNOSIS — D18.0 HEMANGIOMA: ICD-10-CM

## 2024-03-14 DIAGNOSIS — L57.0 ACTINIC KERATOSIS: ICD-10-CM

## 2024-03-14 DIAGNOSIS — Z85.828 PERSONAL HISTORY OF OTHER MALIGNANT NEOPLASM OF SKIN: ICD-10-CM

## 2024-03-14 DIAGNOSIS — L82.1 OTHER SEBORRHEIC KERATOSIS: ICD-10-CM

## 2024-03-14 DIAGNOSIS — D22 MELANOCYTIC NEVI: ICD-10-CM

## 2024-03-14 DIAGNOSIS — Z86.007 PERSONAL HISTORY OF IN-SITU NEOPLASM OF SKIN: ICD-10-CM

## 2024-03-14 DIAGNOSIS — L81.4 OTHER MELANIN HYPERPIGMENTATION: ICD-10-CM

## 2024-03-14 PROBLEM — D22.5 MELANOCYTIC NEVI OF TRUNK: Status: ACTIVE | Noted: 2024-03-14

## 2024-03-14 PROBLEM — D18.01 HEMANGIOMA OF SKIN AND SUBCUTANEOUS TISSUE: Status: ACTIVE | Noted: 2024-03-14

## 2024-03-14 PROCEDURE — 99213 OFFICE O/P EST LOW 20 MIN: CPT | Mod: 25

## 2024-03-14 PROCEDURE — ? COUNSELING

## 2024-03-14 PROCEDURE — 17000 DESTRUCT PREMALG LESION: CPT

## 2024-03-14 PROCEDURE — 17003 DESTRUCT PREMALG LES 2-14: CPT

## 2024-03-14 PROCEDURE — ? LIQUID NITROGEN

## 2024-03-14 PROCEDURE — ? SUNSCREEN RECOMMENDATIONS

## 2024-03-14 ASSESSMENT — LOCATION SIMPLE DESCRIPTION DERM
LOCATION SIMPLE: LEFT CHEEK
LOCATION SIMPLE: RIGHT LOWER BACK
LOCATION SIMPLE: ABDOMEN
LOCATION SIMPLE: LEFT UPPER BACK
LOCATION SIMPLE: RIGHT THIGH
LOCATION SIMPLE: RIGHT CHEEK
LOCATION SIMPLE: RIGHT EAR
LOCATION SIMPLE: LEFT EAR

## 2024-03-14 ASSESSMENT — LOCATION ZONE DERM
LOCATION ZONE: FACE
LOCATION ZONE: LEG
LOCATION ZONE: EAR
LOCATION ZONE: TRUNK

## 2024-03-14 ASSESSMENT — LOCATION DETAILED DESCRIPTION DERM
LOCATION DETAILED: RIGHT ANTERIOR PROXIMAL THIGH
LOCATION DETAILED: RIGHT SUPERIOR MEDIAL MIDBACK
LOCATION DETAILED: EPIGASTRIC SKIN
LOCATION DETAILED: LEFT ANTITRAGUS
LOCATION DETAILED: RIGHT MEDIAL MALAR CHEEK
LOCATION DETAILED: LEFT SUPERIOR MEDIAL UPPER BACK
LOCATION DETAILED: LEFT INFERIOR LATERAL MALAR CHEEK
LOCATION DETAILED: RIGHT SUPERIOR HELIX
LOCATION DETAILED: LEFT MID PREAURICULAR CHEEK
LOCATION DETAILED: LEFT SUPERIOR PREAURICULAR CHEEK

## 2024-03-14 NOTE — PROCEDURE: LIQUID NITROGEN
Post-Care Instructions: I reviewed with the patient in detail post-care instructions. Patient is to wear sunprotection, and avoid picking at any of the treated lesions. Pt may apply Vaseline to crusted or scabbing areas.
Duration Of Freeze Thaw-Cycle (Seconds): 3
Application Tool (Optional): Liquid Nitrogen Sprayer
Render Note In Bullet Format When Appropriate: No
Show Applicator Variable?: Yes
Consent: The patient's consent was obtained including but not limited to risks of crusting, scabbing, blistering, scarring, darker or lighter pigmentary change, recurrence, incomplete removal and infection.
Number Of Freeze-Thaw Cycles: 2 freeze-thaw cycles
Detail Level: Detailed

## 2024-03-15 LAB
ELF SCORE: 9.77 PPM (ref 9.8–11.3)
RELATIVE RISK: NORMAL
RISK GROUP: NORMAL
RISK: 3.3 %

## 2024-04-02 LAB
APOB+LDLR+PCSK9 GENE MUT ANL BLD/T: NOT DETECTED
BRCA1+BRCA2 DEL+DUP + FULL MUT ANL BLD/T: NOT DETECTED
MLH1+MSH2+MSH6+PMS2 GN DEL+DUP+FUL M: NOT DETECTED

## 2024-05-03 DIAGNOSIS — E78.5 DYSLIPIDEMIA: ICD-10-CM

## 2024-05-05 RX ORDER — ATORVASTATIN CALCIUM 40 MG/1
40 TABLET, FILM COATED ORAL DAILY
Qty: 100 TABLET | Refills: 2 | Status: SHIPPED | OUTPATIENT
Start: 2024-05-05

## 2024-05-09 ENCOUNTER — TELEPHONE (OUTPATIENT)
Dept: ENDOCRINOLOGY | Facility: MEDICAL CENTER | Age: 69
End: 2024-05-09
Payer: MEDICARE

## 2024-05-09 NOTE — TELEPHONE ENCOUNTER
Called patient at [phone] and left a voice message regarding PAP Pick-up     Thank you,  Debra Bryant, [facility]  Endocrinology Pharmacy Coordinator

## 2024-08-13 ENCOUNTER — APPOINTMENT (OUTPATIENT)
Dept: MEDICAL GROUP | Facility: MEDICAL CENTER | Age: 69
End: 2024-08-13
Payer: MEDICARE

## 2024-09-03 ENCOUNTER — TELEPHONE (OUTPATIENT)
Dept: ENDOCRINOLOGY | Facility: MEDICAL CENTER | Age: 69
End: 2024-09-03
Payer: MEDICARE

## 2024-09-03 NOTE — TELEPHONE ENCOUNTER
Called and spoke with patient to remind them about upcoming appt and outstanding labs needed for next visit on 9/10/24

## 2024-09-05 ENCOUNTER — HOSPITAL ENCOUNTER (OUTPATIENT)
Dept: LAB | Facility: MEDICAL CENTER | Age: 69
End: 2024-09-05
Attending: INTERNAL MEDICINE
Payer: MEDICARE

## 2024-09-05 DIAGNOSIS — E11.9 CONTROLLED TYPE 2 DIABETES MELLITUS WITHOUT COMPLICATION, WITHOUT LONG-TERM CURRENT USE OF INSULIN (HCC): ICD-10-CM

## 2024-09-05 DIAGNOSIS — Z79.84 LONG TERM (CURRENT) USE OF ORAL HYPOGLYCEMIC DRUGS: ICD-10-CM

## 2024-09-05 DIAGNOSIS — E78.5 DYSLIPIDEMIA: ICD-10-CM

## 2024-09-05 DIAGNOSIS — E55.9 VITAMIN D DEFICIENCY: ICD-10-CM

## 2024-09-05 DIAGNOSIS — E03.9 HYPOTHYROIDISM (ACQUIRED): ICD-10-CM

## 2024-09-05 LAB
25(OH)D3 SERPL-MCNC: 68 NG/ML (ref 30–100)
ALBUMIN SERPL BCP-MCNC: 4.2 G/DL (ref 3.2–4.9)
ALBUMIN/GLOB SERPL: 1.7 G/DL
ALP SERPL-CCNC: 53 U/L (ref 30–99)
ALT SERPL-CCNC: 15 U/L (ref 2–50)
ANION GAP SERPL CALC-SCNC: 12 MMOL/L (ref 7–16)
AST SERPL-CCNC: 11 U/L (ref 12–45)
BILIRUB SERPL-MCNC: 0.5 MG/DL (ref 0.1–1.5)
BUN SERPL-MCNC: 16 MG/DL (ref 8–22)
CALCIUM ALBUM COR SERPL-MCNC: 9 MG/DL (ref 8.5–10.5)
CALCIUM SERPL-MCNC: 9.2 MG/DL (ref 8.5–10.5)
CHLORIDE SERPL-SCNC: 101 MMOL/L (ref 96–112)
CO2 SERPL-SCNC: 25 MMOL/L (ref 20–33)
CREAT SERPL-MCNC: 0.87 MG/DL (ref 0.5–1.4)
GFR SERPLBLD CREATININE-BSD FMLA CKD-EPI: 93 ML/MIN/1.73 M 2
GLOBULIN SER CALC-MCNC: 2.5 G/DL (ref 1.9–3.5)
GLUCOSE SERPL-MCNC: 119 MG/DL (ref 65–99)
POTASSIUM SERPL-SCNC: 4.5 MMOL/L (ref 3.6–5.5)
PROT SERPL-MCNC: 6.7 G/DL (ref 6–8.2)
SODIUM SERPL-SCNC: 138 MMOL/L (ref 135–145)
T4 FREE SERPL-MCNC: 1.2 NG/DL (ref 0.93–1.7)
TSH SERPL-ACNC: 6.14 UIU/ML (ref 0.35–5.5)

## 2024-09-05 PROCEDURE — 36415 COLL VENOUS BLD VENIPUNCTURE: CPT

## 2024-09-05 PROCEDURE — 80053 COMPREHEN METABOLIC PANEL: CPT

## 2024-09-05 PROCEDURE — 84443 ASSAY THYROID STIM HORMONE: CPT

## 2024-09-05 PROCEDURE — 84439 ASSAY OF FREE THYROXINE: CPT

## 2024-09-05 PROCEDURE — 82306 VITAMIN D 25 HYDROXY: CPT

## 2024-09-10 ENCOUNTER — PHARMACY VISIT (OUTPATIENT)
Dept: PHARMACY | Facility: MEDICAL CENTER | Age: 69
End: 2024-09-10
Payer: COMMERCIAL

## 2024-09-10 ENCOUNTER — OFFICE VISIT (OUTPATIENT)
Dept: ENDOCRINOLOGY | Facility: MEDICAL CENTER | Age: 69
End: 2024-09-10
Attending: INTERNAL MEDICINE
Payer: MEDICARE

## 2024-09-10 VITALS
DIASTOLIC BLOOD PRESSURE: 80 MMHG | HEART RATE: 68 BPM | OXYGEN SATURATION: 94 % | HEIGHT: 66 IN | SYSTOLIC BLOOD PRESSURE: 130 MMHG | BODY MASS INDEX: 28.61 KG/M2 | WEIGHT: 178 LBS

## 2024-09-10 DIAGNOSIS — E55.9 VITAMIN D DEFICIENCY: ICD-10-CM

## 2024-09-10 DIAGNOSIS — E03.9 HYPOTHYROIDISM (ACQUIRED): ICD-10-CM

## 2024-09-10 DIAGNOSIS — E78.5 DYSLIPIDEMIA: ICD-10-CM

## 2024-09-10 DIAGNOSIS — Z79.84 LONG TERM (CURRENT) USE OF ORAL HYPOGLYCEMIC DRUGS: ICD-10-CM

## 2024-09-10 DIAGNOSIS — E11.9 CONTROLLED TYPE 2 DIABETES MELLITUS WITHOUT COMPLICATION, WITHOUT LONG-TERM CURRENT USE OF INSULIN (HCC): ICD-10-CM

## 2024-09-10 LAB
HBA1C MFR BLD: 6.4 % (ref ?–5.8)
POCT INT CON NEG: NEGATIVE
POCT INT CON POS: POSITIVE

## 2024-09-10 PROCEDURE — 99214 OFFICE O/P EST MOD 30 MIN: CPT | Performed by: INTERNAL MEDICINE

## 2024-09-10 PROCEDURE — RXMED WILLOW AMBULATORY MEDICATION CHARGE: Performed by: INTERNAL MEDICINE

## 2024-09-10 PROCEDURE — 83036 HEMOGLOBIN GLYCOSYLATED A1C: CPT | Performed by: INTERNAL MEDICINE

## 2024-09-10 PROCEDURE — 3075F SYST BP GE 130 - 139MM HG: CPT | Performed by: INTERNAL MEDICINE

## 2024-09-10 PROCEDURE — 99212 OFFICE O/P EST SF 10 MIN: CPT | Performed by: INTERNAL MEDICINE

## 2024-09-10 PROCEDURE — 3079F DIAST BP 80-89 MM HG: CPT | Performed by: INTERNAL MEDICINE

## 2024-09-10 RX ORDER — LEVOTHYROXINE SODIUM 100 UG/1
100 TABLET ORAL
Qty: 90 TABLET | Refills: 2 | Status: SHIPPED | OUTPATIENT
Start: 2024-09-10

## 2024-09-10 RX ORDER — EMPAGLIFLOZIN 10 MG/1
10 TABLET, FILM COATED ORAL DAILY
Qty: 30 TABLET | Refills: 0 | Status: SHIPPED | OUTPATIENT
Start: 2024-09-10

## 2024-09-10 RX ORDER — EMPAGLIFLOZIN 10 MG/1
1 TABLET, FILM COATED ORAL DAILY
Qty: 90 TABLET | Refills: 3 | Status: SHIPPED | OUTPATIENT
Start: 2024-09-10

## 2024-09-10 RX ORDER — METFORMIN HCL 500 MG
1000 TABLET, EXTENDED RELEASE 24 HR ORAL 2 TIMES DAILY
Qty: 400 TABLET | Refills: 3 | Status: SHIPPED | OUTPATIENT
Start: 2024-09-10

## 2024-09-10 RX ORDER — PIOGLITAZONEHYDROCHLORIDE 30 MG/1
30 TABLET ORAL DAILY
Qty: 100 TABLET | Refills: 3 | Status: SHIPPED | OUTPATIENT
Start: 2024-09-10

## 2024-09-10 ASSESSMENT — FIBROSIS 4 INDEX: FIB4 SCORE: 0.71

## 2024-09-10 NOTE — PROGRESS NOTES
CHIEF COMPLAINT: Patient is here for follow up of Type 2 Diabetes Mellitus    HPI:     Kristian Frank is a 69 y.o. male with Type 2 Diabetes Mellitus here for follow up.      Labs from 9/10/2024 show A1c is 6.4%  Labs from 3/7/2024 show A1c is 6.4%  Labs from 9/5/2023 show A1c was 6.2%  Labs from 3/2/1/2023 show A1c was 6.4%  Labs from 10/18/2022 show A1c was 6.7%    He was previously seen by the nurse practitioner   Comorbid issues include hypothyroidism, hyperlipidemia  He denies a history of coronary artery disease and cerebrovascular disease        He is on  Metformin extended release 1000 mg twice a day  Actos 30 mg daily  Jardiance 10 mg daily  Ozempic 1 mg weekly  (PAP)      He denies SE with his medications        He has hyperlipidemia  He is taking Atorvastatin 40 mg daily  He denies muscle aches and cramps   No leg weakness  He denies a hx CAD and CVA   Latest Reference Range & Units 02/29/24 07:48   Cholesterol,Tot 100 - 199 mg/dL 129   Triglycerides 0 - 149 mg/dL 75   HDL >=40 mg/dL 51   LDL <100 mg/dL 63           He doesn't have diabetic kidney disease   Latest Reference Range & Units 02/29/24 07:47   Micro Alb Creat Ratio 0 - 30 mg/g see below   Creatinine, Urine mg/dL 73.45   Microalbumin, Urine Random mg/dL <1.2         He had an eye exam on 3/2024      He has hypothyroidism and was taking Levothyroxine 137mcg daily 5 days a week  but TSH was low at 0.02  He is now on Levothyroxine 88 MCG daily  He denies palpitations and tremors     His TSH is high     Latest Reference Range & Units 09/05/24 08:35   25-Hydroxy   Vitamin D 25 30 - 100 ng/mL 68   TSH 0.350 - 5.500 uIU/mL 6.140 (H)   Free T-4 0.93 - 1.70 ng/dL 1.20       BG Diary:  Patient did not bring his glucose meter    Weight has been stable    Diabetes Complications   Retinopathy: No known retinopathy.  Last eye exam: 3/2024  Neuropathy: Denies paresthesias or numbness in hands or feet. Denies any foot wounds.  Exercise: Minimal.  Diet:  Fair.  Patient's medications, allergies, and social histories were reviewed and updated as appropriate.    ROS:     CONS:     No fever, no chills   EYES:     No diplopia, no blurry vision   CV:           No chest pain, no palpitations   PULM:     No SOB, no cough, no hemoptysis.   GI:            No nausea, no vomiting, no diarrhea, no constipation   ENDO:     No polyuria, no polydipsia, no heat intolerance, no cold intolerance       Past Medical History:  Problem List:  2023-07: Atherosclerosis of both carotid arteries  2023-03: On economic assistance program  2023-02: Controlled type 2 diabetes mellitus without complication,   without long-term current use of insulin ([facility])  2022-10: Long term (current) use of oral hypoglycemic drugs  2022-03: BMI 29.0-29.9,adult  2022-03: Mild cognitive impairment  2022-03: Diabetic mononeuropathy associated with type 2 diabetes   mellitus ([facility])  2020-05: Obesity (BMI 30-39.9)  2019-05: Uncomplicated alcohol dependence ([facility])  2019-05: Essential hypertension  2018-06: Obesity (BMI 30-39.9)  2018-03: Carotid atherosclerosis  2018-01: Left cervical radiculopathy  2018-01: Left hand weakness  2016-04: Allergic rhinitis  2016-04: Post-nasal drip  2016-04: Chronic bilateral low back pain with right-sided sciatica  2016-02: Abnormal EKG  2016-02: Cigarette nicotine dependence without complication  2016-02: Obesity (BMI 30.0-34.9)  2016-02: Epigastric abdominal pain  2016-02: Sleep apnea  2016-02: Tenosynovitis of wrist  2013-10: Type 2 diabetes mellitus with hemoglobin A1c goal of less   than 7.0% ([facility])  2012-12: Vitamin D deficiency disease  Dyslipidemia  DIABETES MELLITUS  Hypothyroidism (acquired)  Diverticulitis  Controlled type 2 diabetes mellitus with diabetic mononeuropathy,   without long-term current use of insulin ([facility])      Past Surgical History:  Past Surgical History:   Procedure Laterality Date    PB RECONSTR TOTAL SHOULDER IMPLANT Right 11/7/2023    Procedure: RIGHT TOTAL  "SHOULDER ARTHROPLASTY AND REPAIRS AS INDICATED;  Surgeon: Montserrat Varghese M.D.;  Location: SURGERY [facility];  Service: Orthopedics    APPENDECTOMY      COLON RESECTION          Allergies:  Patient has no known allergies.     Social History:  Social History     Tobacco Use    Smoking status: Every Day     Current packs/day: 0.50     Average packs/day: 0.5 packs/day for 28.0 years (14.0 ttl pk-yrs)     Types: Cigarettes    Smokeless tobacco: Never   Vaping Use    Vaping status: Never Used   Substance Use Topics    Alcohol use: Yes     Alcohol/week: 8.4 - 12.6 oz     Types: 14 - 21 Cans of beer per week     Comment: 2-3 a day    Drug use: Yes     Frequency: 4.0 times per week     Types: Marijuana, Inhaled        Family History:   family history includes Cancer in his sister; Dementia in his mother; Diabetes in his brother, mother, sister, and sister; Heart Attack in his father; Heart Disease in his father; Hyperlipidemia in his brother, father, mother, and sister; Hypertension in his brother, father, mother, and sister; Lung Disease in his sister.      PHYSICAL EXAM:   OBJECTIVE:  Vital signs: /80 (BP Location: Left arm, Patient Position: Sitting, BP Cuff Size: Adult long)   Pulse 68   Ht 1.676 m (5' 6\")   Wt 80.7 kg (178 lb)   SpO2 94%   BMI 28.73 kg/m²   GENERAL: Well-developed, well-nourished in no apparent distress.   EYE:  No ocular asymmetry, PERRLA  HENT: Pink, moist mucous membranes.    NECK: No thyromegaly.   CARDIOVASCULAR:  No murmurs  LUNGS: Clear breath sounds  ABDOMEN: Soft, nontender   EXTREMITIES: No clubbing, cyanosis, or edema.   NEUROLOGICAL: No gross focal motor abnormalities   LYMPH: No cervical adenopathy palpated.   SKIN: No rashes, lesions.     Labs:  Lab Results   Component Value Date/Time    HBA1C 6.4 (A) 09/10/2024 02:56 PM        Lab Results   Component Value Date/Time    WBC 6.9 10/25/2023 01:48 PM    RBC 4.86 10/25/2023 01:48 PM    HEMOGLOBIN 15.4 10/25/2023 01:48 PM    " MCV 93.2 10/25/2023 01:48 PM    MCH 31.7 10/25/2023 01:48 PM    MCHC 34.0 10/25/2023 01:48 PM    RDW 48.6 10/25/2023 01:48 PM    MPV 10.9 10/25/2023 01:48 PM       Lab Results   Component Value Date/Time    SODIUM 138 09/05/2024 08:35 AM    POTASSIUM 4.5 09/05/2024 08:35 AM    CHLORIDE 101 09/05/2024 08:35 AM    CO2 25 09/05/2024 08:35 AM    ANION 12.0 09/05/2024 08:35 AM    GLUCOSE 119 (H) 09/05/2024 08:35 AM    BUN 16 09/05/2024 08:35 AM    CREATININE 0.87 09/05/2024 08:35 AM    CREATININE 0.93 03/22/2013 07:21 AM    CALCIUM 9.2 09/05/2024 08:35 AM    ASTSGOT 11 (L) 09/05/2024 08:35 AM    ALTSGPT 15 09/05/2024 08:35 AM    TBILIRUBIN 0.5 09/05/2024 08:35 AM    ALBUMIN 4.2 09/05/2024 08:35 AM    TOTPROTEIN 6.7 09/05/2024 08:35 AM    GLOBULIN 2.5 09/05/2024 08:35 AM    AGRATIO 1.7 09/05/2024 08:35 AM       Lab Results   Component Value Date/Time    CHOLSTRLTOT 161 02/16/2022 0737    TRIGLYCERIDE 128 02/16/2022 0737    HDL 50 02/16/2022 0737    LDL 85 02/16/2022 0737       Lab Results   Component Value Date/Time    MICROALBCALC 5.9 10/16/2013 07:12 AM    MALBCRT see below 02/29/2024 07:47 AM    MICROALBUR <1.2 02/29/2024 07:47 AM    MICRALB 7.4 10/16/2013 07:12 AM        Lab Results   Component Value Date/Time    TSHULTRASEN 0.120 (L) 02/16/2022 0737     No results found for: FREEDIR  Lab Results   Component Value Date/Time    FREET3 3.12 02/16/2022 0737     No results found for: THYSTIMIG        ASSESSMENT/PLAN:     1. Controlled type 2 diabetes mellitus without complication, without long-term current use of insulin (HCC)  Controlled  Continue Ozempic 1 mg once a week   Continue metformin max dose  Continue Actos max dose  Continue Jardiance 10 mg daily  Ordered samples of Jardiance  He is up-to-date with his labs  Follow-up with me in 6 months with repeat A1c      2. Hypothyroidism (acquired)  Uncontrolled  TSH is high   Increase Levothyroxine to 100mcg daily   Stop Levothyroxine 88mcg daily   Repeat thyroid labs in  3- 6 months    3. Dyslipidemia  Controlled  Continue atorvastatin  Repeat fasting lipids in 12 months    4. Vitamin D deficiency  Stable   Vitamin D labs were reviewed with patient  Continue current supplements  Continue monitoring levels       5. Long term (current) use of oral hypoglycemic drugs  Patient is on multiple oral agents and a GLP-1 for type 2 diabetes management      Return in about 5 months (around 2/10/2025).      Thank you kindly for allowing me to participate in the diabetes care plan for this patient.    Kristian Jones MD, FACE, [facility]      CC:   LEON Hernandez

## 2024-10-08 ENCOUNTER — APPOINTMENT (OUTPATIENT)
Dept: MEDICAL GROUP | Facility: MEDICAL CENTER | Age: 69
End: 2024-10-08
Payer: MEDICARE

## 2024-10-12 ENCOUNTER — OFFICE VISIT (OUTPATIENT)
Dept: URGENT CARE | Facility: PHYSICIAN GROUP | Age: 69
End: 2024-10-12
Payer: MEDICARE

## 2024-10-12 VITALS
WEIGHT: 177.25 LBS | SYSTOLIC BLOOD PRESSURE: 116 MMHG | HEIGHT: 66 IN | OXYGEN SATURATION: 98 % | RESPIRATION RATE: 14 BRPM | TEMPERATURE: 97.5 F | DIASTOLIC BLOOD PRESSURE: 70 MMHG | BODY MASS INDEX: 28.49 KG/M2 | HEART RATE: 73 BPM

## 2024-10-12 DIAGNOSIS — R10.9 BELLY PAIN: ICD-10-CM

## 2024-10-12 DIAGNOSIS — E11.9 CONTROLLED TYPE 2 DIABETES MELLITUS WITHOUT COMPLICATION, WITHOUT LONG-TERM CURRENT USE OF INSULIN (HCC): ICD-10-CM

## 2024-10-12 LAB
APPEARANCE UR: CLEAR
BILIRUB UR STRIP-MCNC: NEGATIVE MG/DL
COLOR UR AUTO: YELLOW
GLUCOSE UR STRIP.AUTO-MCNC: >=1000 MG/DL
KETONES UR STRIP.AUTO-MCNC: NORMAL MG/DL
LEUKOCYTE ESTERASE UR QL STRIP.AUTO: NEGATIVE
NITRITE UR QL STRIP.AUTO: NEGATIVE
PH UR STRIP.AUTO: 6 [PH] (ref 5–8)
PROT UR QL STRIP: NEGATIVE MG/DL
RBC UR QL AUTO: NEGATIVE
SP GR UR STRIP.AUTO: 1.02
UROBILINOGEN UR STRIP-MCNC: 0.2 MG/DL

## 2024-10-12 PROCEDURE — 81002 URINALYSIS NONAUTO W/O SCOPE: CPT | Performed by: FAMILY MEDICINE

## 2024-10-12 PROCEDURE — 3078F DIAST BP <80 MM HG: CPT | Performed by: FAMILY MEDICINE

## 2024-10-12 PROCEDURE — 99214 OFFICE O/P EST MOD 30 MIN: CPT | Performed by: FAMILY MEDICINE

## 2024-10-12 PROCEDURE — 3074F SYST BP LT 130 MM HG: CPT | Performed by: FAMILY MEDICINE

## 2024-10-12 ASSESSMENT — ENCOUNTER SYMPTOMS: ABDOMINAL PAIN: 1

## 2024-10-12 ASSESSMENT — FIBROSIS 4 INDEX: FIB4 SCORE: 0.71

## 2024-10-13 ENCOUNTER — HOSPITAL ENCOUNTER (OUTPATIENT)
Dept: RADIOLOGY | Facility: MEDICAL CENTER | Age: 69
End: 2024-10-13
Attending: FAMILY MEDICINE
Payer: MEDICARE

## 2024-10-13 DIAGNOSIS — R10.9 BELLY PAIN: ICD-10-CM

## 2024-10-13 PROCEDURE — 74177 CT ABD & PELVIS W/CONTRAST: CPT

## 2024-10-13 PROCEDURE — 700117 HCHG RX CONTRAST REV CODE 255: Performed by: FAMILY MEDICINE

## 2024-10-13 RX ADMIN — IOHEXOL 25 ML: 240 INJECTION, SOLUTION INTRATHECAL; INTRAVASCULAR; INTRAVENOUS; ORAL at 12:00

## 2024-10-13 RX ADMIN — IOHEXOL 100 ML: 350 INJECTION, SOLUTION INTRAVENOUS at 13:00

## 2024-12-06 ENCOUNTER — APPOINTMENT (OUTPATIENT)
Dept: MEDICAL GROUP | Facility: MEDICAL CENTER | Age: 69
End: 2024-12-06
Payer: MEDICARE

## 2024-12-06 VITALS
OXYGEN SATURATION: 95 % | TEMPERATURE: 98 F | HEART RATE: 85 BPM | DIASTOLIC BLOOD PRESSURE: 56 MMHG | BODY MASS INDEX: 29.18 KG/M2 | SYSTOLIC BLOOD PRESSURE: 118 MMHG | WEIGHT: 181.6 LBS | HEIGHT: 66 IN | RESPIRATION RATE: 14 BRPM

## 2024-12-06 DIAGNOSIS — E78.5 DYSLIPIDEMIA ASSOCIATED WITH TYPE 2 DIABETES MELLITUS (HCC): ICD-10-CM

## 2024-12-06 DIAGNOSIS — Z23 NEED FOR VACCINATION: ICD-10-CM

## 2024-12-06 DIAGNOSIS — E11.69 DYSLIPIDEMIA ASSOCIATED WITH TYPE 2 DIABETES MELLITUS (HCC): ICD-10-CM

## 2024-12-06 DIAGNOSIS — G47.30 SLEEP APNEA, UNSPECIFIED TYPE: ICD-10-CM

## 2024-12-06 DIAGNOSIS — F17.210 CIGARETTE NICOTINE DEPENDENCE WITHOUT COMPLICATION: ICD-10-CM

## 2024-12-06 DIAGNOSIS — I65.23 ATHEROSCLEROSIS OF BOTH CAROTID ARTERIES: ICD-10-CM

## 2024-12-06 DIAGNOSIS — F10.20 UNCOMPLICATED ALCOHOL DEPENDENCE (HCC): ICD-10-CM

## 2024-12-06 DIAGNOSIS — Z00.00 MEDICARE ANNUAL WELLNESS VISIT, SUBSEQUENT: ICD-10-CM

## 2024-12-06 DIAGNOSIS — E11.41 TYPE 2 DIABETES MELLITUS WITH DIABETIC MONONEUROPATHY, WITHOUT LONG-TERM CURRENT USE OF INSULIN (HCC): ICD-10-CM

## 2024-12-06 DIAGNOSIS — E55.9 VITAMIN D DEFICIENCY: ICD-10-CM

## 2024-12-06 DIAGNOSIS — E03.9 HYPOTHYROIDISM (ACQUIRED): ICD-10-CM

## 2024-12-06 PROCEDURE — G0008 ADMIN INFLUENZA VIRUS VAC: HCPCS | Performed by: NURSE PRACTITIONER

## 2024-12-06 PROCEDURE — 90662 IIV NO PRSV INCREASED AG IM: CPT | Performed by: NURSE PRACTITIONER

## 2024-12-06 PROCEDURE — 3078F DIAST BP <80 MM HG: CPT | Performed by: NURSE PRACTITIONER

## 2024-12-06 PROCEDURE — G0439 PPPS, SUBSEQ VISIT: HCPCS | Mod: 25 | Performed by: NURSE PRACTITIONER

## 2024-12-06 PROCEDURE — 3074F SYST BP LT 130 MM HG: CPT | Performed by: NURSE PRACTITIONER

## 2024-12-06 ASSESSMENT — ENCOUNTER SYMPTOMS: GENERAL WELL-BEING: GOOD

## 2024-12-06 ASSESSMENT — PATIENT HEALTH QUESTIONNAIRE - PHQ9: CLINICAL INTERPRETATION OF PHQ2 SCORE: 0

## 2024-12-06 ASSESSMENT — ACTIVITIES OF DAILY LIVING (ADL): BATHING_REQUIRES_ASSISTANCE: 0

## 2024-12-06 ASSESSMENT — FIBROSIS 4 INDEX: FIB4 SCORE: 0.71

## 2024-12-06 NOTE — PROGRESS NOTES
Chief Complaint   Patient presents with    Annual Exam       HPI:  Kristian Frank is a 69 y.o. here for Medicare Annual Wellness Visit     Verbal consent was acquired by the patient to use "[facility]" ambient listening note generation during this visit Yes   History of Present Illness  The patient presents for a routine checkup.    He underwent shoulder surgery on 11/07/2023, which was successful, and he has since recovered well. He reports no chest pain, shortness of breath, dizziness, numbness, or tingling. He is physically active.    His current medications include Jardiance, metformin, and Actos for diabetes, levothyroxine for thyroid issues, and atorvastatin for cholesterol management. He takes naproxen as needed and uses ibuprofen for muscle aches. He also takes Ozempic once a week, B12, and vitamin D3. His thyroid medication dosage was increased to 100 in 10/2024. His A1c levels have been satisfactory.    He does not use a CPAP machine due to intolerance and has lost weight. He does not snore.    He visits his eye doctor annually and dentist biannually. His next colonoscopy is scheduled for 2026 due to the presence of polyps.    SOCIAL HISTORY  He drinks alcohol.    IMMUNIZATIONS  He is up to date on his influenza vaccine.      Patient Active Problem List    Diagnosis Date Noted    Atherosclerosis of both carotid arteries 07/14/2023    On economic assistance program 03/21/2023    Controlled type 2 diabetes mellitus without complication, without long-term current use of insulin (HCC) 02/09/2023    Long term (current) use of oral hypoglycemic drugs 10/18/2022    Diabetic mononeuropathy associated with type 2 diabetes mellitus (HCC) 03/08/2022    Uncomplicated alcohol dependence (HCC) 05/08/2019    Left cervical radiculopathy 01/29/2018    Allergic rhinitis 04/18/2016    Chronic bilateral low back pain with right-sided sciatica 04/18/2016    Abnormal EKG 02/11/2016    Cigarette nicotine dependence  without complication 02/11/2016    Sleep apnea 02/11/2016    Tenosynovitis of wrist 02/11/2016    Vitamin D deficiency disease 12/04/2012    Hypothyroidism (acquired)        Current Outpatient Medications   Medication Sig Dispense Refill    levothyroxine (SYNTHROID) 100 MCG Tab Take 1 Tablet by mouth every morning on an empty stomach. 90 Tablet 2    pioglitazone (ACTOS) 30 MG Tab Take 1 Tablet by mouth every day. 100 Tablet 3    metFORMIN ER (GLUCOPHAGE XR) 500 MG TABLET SR 24 HR Take 2 Tablets by mouth 2 times a day. 400 Tablet 3    Empagliflozin (JARDIANCE) 10 MG Tab tablet Take 1 Tablet by mouth every day. 90 Tablet 3    Empagliflozin (JARDIANCE) 10 MG Tab tablet Take 1 Tablet by mouth every day. Sample only 30 Tablet 0    atorvastatin (LIPITOR) 40 MG Tab TAKE 1 TABLET BY MOUTH DAILY 100 Tablet 2    naproxen (NAPROSYN) 500 MG Tab       Semaglutide, 1 MG/DOSE, (OZEMPIC, 1 MG/DOSE,) 4 MG/3ML Solution Pen-injector Inject 1 mg under the skin every 7 days. 3 mL 11    cyanocobalamin (VITAMIN B12) 1000 MCG Tab Take 1 tablet by mouth every day. 30 tablet 11    FREESTYLE TEST STRIPS strip USE ONE NEW TEST STRIP EACH TIME TO TEST BLOOD SUGAR TWO TIMES A DAY AND AS NEEDED IF SYMPTOMS FOR HIGH OR LOW BLOOD SUGAR 100 Strip 0    Lancets Use one Freestyle lancet to test blood sugar twice daily. 100 Each 4    Blood Glucose Meter Kit Test blood sugar as recommended by provider. Freestyle blood glucose monitoring kit. 1 Kit 0    vitamin D (CHOLECALCIFEROL) 1000 UNIT Tab Take 3 Tabs by mouth every day. 60 Tab      No current facility-administered medications for this visit.          Current supplements as per medication list.     Allergies: Patient has no known allergies.    Current social contact/activities: skiing, hiking, walking daily, sailboat, fishing.     He  reports that he has been smoking cigarettes. He has a 14 pack-year smoking history. He has never used smokeless tobacco. He reports current alcohol use of about 8.4 -  12.6 oz of alcohol per week. He reports current drug use. Frequency: 4.00 times per week. Drugs: Marijuana and Inhaled.  Ready to quit: Not Answered  Counseling given: Yes      ROS:    Gait: Uses no assistive device  Ostomy: No  Other tubes: No  Amputations: No  Chronic oxygen use: No  Last eye exam: 11/2024  Wears hearing aids: No   : Reports urinary leakage during the last 6 months that has not interfered at all with their daily activities or sleep.    Screening:    Depression Screening  Little interest or pleasure in doing things?  0 - not at all  Feeling down, depressed , or hopeless? 0 - not at all  Patient Health Questionnaire Score: 0     If depressive symptoms identified deferred to follow up visit unless specifically addressed in assessment and plan.    Interpretation of PHQ-9 Total Score   Score Severity   1-4 No Depression   5-9 Mild Depression   10-14 Moderate Depression   15-19 Moderately Severe Depression   20-27 Severe Depression    Screening for Cognitive Impairment  Do you or any of your friends or family members have any concern about your memory? No  Three Minute Recall (Leader, Season, Table) 3/3    Finn clock face with all 12 numbers and set the hands to show 10 minutes after 11.  Yes    Cognitive concerns identified deferred for follow up unless specifically addressed in assessment and plan.    Fall Risk Assessment  Has the patient had two or more falls in the last year or any fall with injury in the last year?  No    Safety Assessment  Do you always wear your seatbelt?  Yes  Any changes to home needed to function safely? No  Difficulty hearing.  No  Patient counseled about all safety risks that were identified.    Functional Assessment ADLs  Are there any barriers preventing you from cooking for yourself or meeting nutritional needs?  No.    Are there any barriers preventing you from driving safely or obtaining transportation?  No.    Are there any barriers preventing you from using a  telephone or calling for help?  No    Are there any barriers preventing you from shopping?  No.    Are there any barriers preventing you from taking care of your own finances?  No    Are there any barriers preventing you from managing your medications?  No    Are there any barriers preventing you from showering, bathing or dressing yourself? No    Are there any barriers preventing you from doing housework or laundry? No  Are there any barriers preventing you from using the toilet?No  Are you currently engaging in any exercise or physical activity?  Yes.      Self-Assessment of Health  What is your perception of your health? Good    Do you sleep more than six hours a night? Yes    In the past 7 days, how much did pain keep you from doing your normal work? None    Do you spend quality time with family or friends (virtually or in person)? Yes    Do you usually eat a heart healthy diet that constists of a variety of fruits, vegetables, whole grains and fiber? Yes    Do you eat foods high in fat and/or Fast Food more than three times per week? No    How concerned are you that your medical conditions are not being well managed? Not at all    Are you worried that in the next 2 months, you may not have stable housing that you own, rent, or stay in as part of a household? No      Advance Care Planning  Do you have an Advance Directive, Living Will, Durable Power of , or POLST? Yes  Advance Directive              Health Maintenance Summary            Overdue - Hepatitis A Vaccine (Hep A) (2 of 3 - Hep A Twinrix risk 3-dose series) Overdue since 3/13/2019      02/13/2019  Imm Admin: Hep A/HEP B Combined Vaccine (TwinRix)              Ordered - Fasting Lipid Profile (Yearly) Ordered on 9/10/2024      02/29/2024  Lipid Profile    03/15/2023  Lipid Profile    02/16/2022  Lipid Profile    02/03/2021  Lipid Profile    11/04/2020  Lipid Profile    Only the first 5 history entries have been loaded, but more history exists.               Ordered - Diabetes: Urine Protein Screening (Yearly) Ordered on 9/10/2024      02/29/2024  MICROALBUMIN CREAT RATIO URINE    01/29/2024  MICROALBUMIN CREAT RATIO URINE    03/15/2023  MICROALBUMIN CREAT RATIO URINE    02/16/2022  MICROALBUMIN CREAT RATIO URINE    06/10/2021  MICROALBUMIN CREAT RATIO URINE    Only the first 5 history entries have been loaded, but more history exists.              Diabetes: Monofilament / LE Exam (Yearly) Next due on 3/7/2025      03/07/2024  Diabetic Monofilament LE Exam    03/07/2024  SmartData: WORKFLOW - DIABETES - DIABETIC FOOT EXAM PERFORMED    09/05/2023  SmartData: WORKFLOW - DIABETES - DIABETIC FOOT EXAM PERFORMED    09/05/2023  Diabetic Monofilament LE Exam    10/18/2022  Diabetic Monofilament LE Exam    Only the first 5 history entries have been loaded, but more history exists.              A1c Screening (Every 6 Months) Next due on 3/10/2025      09/10/2024  POCT Hemoglobin A1C    03/07/2024  POCT  A1C    09/05/2023  POCT Hemoglobin A1C    03/21/2023  POCT Hemoglobin A1C    10/18/2022  POCT Hemoglobin A1C    Only the first 5 history entries have been loaded, but more history exists.              Diabetes: Retinopathy Screening (Yearly) Next due on 3/12/2025      03/12/2024  Done    07/06/2023  AMB EXTERNAL RETINAL SCREENING RESULTS    10/25/2022  RETINAL SCREENING RESULTS    10/18/2022  POCT Retinal Eye Exam    02/18/2021  Done    Only the first 5 history entries have been loaded, but more history exists.              Ordered - SERUM CREATININE (Yearly) Ordered on 9/10/2024      09/05/2024  Comp Metabolic Panel    02/29/2024  Comp Metabolic Panel    10/25/2023  Comp Metabolic Panel    08/31/2023  Comp Metabolic Panel    03/15/2023  Comp Metabolic Panel    Only the first 5 history entries have been loaded, but more history exists.              Annual Wellness Visit (Yearly) Next due on 12/6/2025 12/06/2024  Visit Dx: Medicare annual wellness visit,  subsequent    12/06/2024  Subsequent Annual Wellness Visit - Includes PPPS ()    12/06/2024  Level of Service: MA ANNUAL WELLNESS VISIT-INCLUDES PPPS SUBSEQUE*    01/29/2024  Level of Service: MA ANNUAL WELLNESS VISIT-INCLUDES PPPS SUBSEQUE*    07/14/2023  Level of Service: MA ANNUAL WELLNESS VISIT-INCLUDES PPPS SUBSEQUE*    Only the first 5 history entries have been loaded, but more history exists.              Colorectal Cancer Screening (Colonoscopy - Every 3 Years) Next due on 8/31/2026 08/31/2023  AMB EXTERNAL COLONOSCOPY RESULTS    08/29/2023  AMB EXTERNAL COLONOSCOPY RESULTS    08/12/2020  REFERRAL TO GI FOR COLONOSCOPY    08/05/2015  AMB REFERRAL TO GI FOR COLONOSCOPY              IMM DTaP/Tdap/Td Vaccine (3 - Td or Tdap) Next due on 2/1/2033 02/01/2023  Imm Admin: Tdap Vaccine    11/08/2013  Imm Admin: Tdap Vaccine              Zoster (Shingles) Vaccines (Series Information) Completed      07/01/2019  Imm Admin: Zoster Vaccine Recombinant (RZV) (SHINGRIX)    02/13/2019  Imm Admin: Zoster Vaccine Recombinant (RZV) (SHINGRIX)              Hepatitis C Screening  Completed      06/19/2020  Hepatitis C Antibody component of HEP C VIRUS ANTIBODY              Pneumococcal Vaccine: 65+ Years (Series Information) Completed      07/28/2022  Imm Admin: Pneumococcal Conjugate Vaccine (PCV20)    05/06/2019  Imm Admin: Pneumococcal Conjugate Vaccine (Prevnar/PCV-13)    10/17/2017  Imm Admin: Pneumococcal polysaccharide vaccine (PPSV-23)    07/18/2016  Imm Admin: Pneumococcal polysaccharide vaccine (PPSV-23)              Abdominal Aortic Aneurysm (AAA) Screening  Completed      10/13/2024  CT-ABDOMEN-PELVIS WITH    08/30/2022  US-ABDOMINAL AORTA W/O DOPPLER              COVID-19 Vaccine (Series Information) Completed      11/15/2024  Imm Admin: COVID-19, mRNA, LNP-S, PF, atsha-sucrose, 30 mcg/0.3 mL    10/09/2023  Imm Admin: Covid-19 Mrna (Spikevax) Moderna 12+ Years    12/27/2022  Imm Admin: MODERNA  BIVALENT BOOSTER SARS-COV-2 VACCINE (6+)    04/29/2022  Imm Admin: MODERNA SARS-COV-2 VACCINE (12+)    11/23/2021  Imm Admin: MODERNA SARS-COV-2 VACCINE (12+)    Only the first 5 history entries have been loaded, but more history exists.              Influenza Vaccine (Series Information) Completed      12/06/2024  Imm Admin: Influenza high-dose trivalent (PF)    10/09/2023  Imm Admin: Influenza Vaccine Adult HD    12/27/2022  Imm Admin: Influenza, unspecified formulation    10/25/2021  Imm Admin: Influenza Vaccine Adult HD    11/20/2020  Imm Admin: Influenza Vaccine Adult HD    Only the first 5 history entries have been loaded, but more history exists.              HPV Vaccines (Series Information) Aged Out      No completion history exists for this topic.              Polio Vaccine (Inactivated Polio) (Series Information) Aged Out      No completion history exists for this topic.              Meningococcal Immunization (Series Information) Aged Out      No completion history exists for this topic.              Discontinued - Prostate Specific Antigen (PSA) Screening  Discontinued        Frequency changed to Never automatically (Topic No Longer Applies)    06/19/2020  Prostatic Specific Antigen Tot component of PROSTATE SPECIFIC AG SCREENING    05/10/2019  Prostatic Specific Antigen Tot component of PROSTATE SPECIFIC AG SCREENING    06/26/2018  Prostatic Specific Antigen Tot component of PROSTATE SPECIFIC AG SCREENING              Discontinued - Hepatitis B Vaccine (Hep B)  Discontinued      02/13/2019  Imm Admin: Hep A/HEP B Combined Vaccine (TwinRix)                    Patient Care Team:  CELINE HernandezP.RYOSHI as PCP - General (Nurse Practitioner)  CELINE HernandezPAshleighRYOSHI as PCP - [facility] Paneled  Tello Fuller O.D. as Consulting Physician (Optometry)  Jason Holbrook P.A.-C. (Endocrinology)  Tsering Rodriguez as    Lester Durham M.D. (Orthopedic Surgery)  Ashley VICTOR  "LEON Osborne (Nurse Practitioner Family)  Rony Nettles M.D. (Geriatrics)        Social History     Tobacco Use    Smoking status: Every Day     Current packs/day: 0.50     Average packs/day: 0.5 packs/day for 28.0 years (14.0 ttl pk-yrs)     Types: Cigarettes    Smokeless tobacco: Never   Vaping Use    Vaping status: Never Used   Substance Use Topics    Alcohol use: Yes     Alcohol/week: 8.4 - 12.6 oz     Types: 14 - 21 Cans of beer per week     Comment: 2-3 a day    Drug use: Yes     Frequency: 4.0 times per week     Types: Marijuana, Inhaled     Family History   Problem Relation Age of Onset    Diabetes Mother     Hypertension Mother     Hyperlipidemia Mother     Dementia Mother     Heart Disease Father     Hypertension Father     Hyperlipidemia Father     Heart Attack Father     Diabetes Sister     Hyperlipidemia Sister     Hypertension Sister     Diabetes Sister         type 2    Cancer Sister         Lung cancer    Lung Disease Sister     Diabetes Brother         Type 2    Hypertension Brother     Hyperlipidemia Brother      He  has a past medical history of Back pain, DIABETES MELLITUS, Diverticulitis, Dyslipidemia, GOUT, High cholesterol, Hyperlipidemia, Hypothyroid, Pneumonia, Post-nasal drip, and Rhinitis.   Past Surgical History:   Procedure Laterality Date    PB RECONSTR TOTAL SHOULDER IMPLANT Right 11/7/2023    Procedure: RIGHT TOTAL SHOULDER ARTHROPLASTY AND REPAIRS AS INDICATED;  Surgeon: Montserrat Varghese M.D.;  Location: SURGERY [facility];  Service: Orthopedics    APPENDECTOMY      COLON RESECTION         Exam:   /56   Pulse 85   Temp 36.7 °C (98 °F) (Temporal)   Resp 14   Ht 1.676 m (5' 6\")   Wt 82.4 kg (181 lb 9.6 oz)   SpO2 95%  Body mass index is 29.31 kg/m².    Hearing good.    Dentition fair  Alert, oriented in no acute distress.  Eye contact is good, speech goal directed, affect calm    Assessment and Plan. The following treatment and monitoring plan is recommended:  "     1. Medicare annual wellness visit, subsequent  Have discussed recommended screenings and immunizations.  - Subsequent Annual Wellness Visit - Includes PPPS ()    2. Type 2 diabetes mellitus with diabetic mononeuropathy, without long-term current use of insulin (HCC)  Chronic, stable.  A1c at goal.  Continue Jardiance, metformin, Actos and Ozempic.  Followed by endocrinology.  - Subsequent Annual Wellness Visit - Includes PPPS ()    3. Dyslipidemia associated with type 2 diabetes mellitus (HCC)  Chronic, LDL at goal less than 70.  On atorvastatin 40 mg.  Denies myalgias.  Normal LFTs.  Continue.  - Subsequent Annual Wellness Visit - Includes PPPS ()    4. Uncomplicated alcohol dependence (HCC)  Chronic.  Patient denies interference with ADLs or social activity.  - Subsequent Annual Wellness Visit - Includes PPPS ()    5. Hypothyroidism (acquired)  Chronic.  Levothyroxine recently adjusted due to elevated TSH.  Continue levothyroxine 100 mcg daily.  Continue to follow-up with endocrinology.  - Subsequent Annual Wellness Visit - Includes PPPS ()    6. Atherosclerosis of both carotid arteries  Known problem present on carotid ultrasound from 2018. Asymptomatic. Continue statin, diabetes control, blood pressure control and recommend reducing tobacco products. Monitor for symptoms.  - Subsequent Annual Wellness Visit - Includes PPPS ()    7. Sleep apnea, unspecified type  Known problem. Not treated with CPAP due to intolerance.  Patient reports he no longer snores.  - Subsequent Annual Wellness Visit - Includes PPPS ()    8. Vitamin D deficiency disease  Chronic, stable levels. Continue OTC supplementation.   - Subsequent Annual Wellness Visit - Includes PPPS ()    9. Cigarette nicotine dependence without complication  Chronic. Continue to encourage reduction and/or cessation of tobacco products.   - Subsequent Annual Wellness Visit - Includes PPPS ()    10. Need for  vaccination  - Influenza Vaccine, High Dose (65+ Only)  - Subsequent Annual Wellness Visit - Includes PPPS ()    Assessment & Plan  1. Post-shoulder surgery follow-up.  He reports no problems post-surgery and has resumed activities such as skiing by March. No further issues noted.    2. Diabetes Mellitus.  He is currently on Jardiance, metformin, Actos, and Ozempic once a week. His A1c has been stable at 6.4. He should continue his current medication regimen.    3. Hypothyroidism.  His levothyroxine dosage was increased to 100 mcg in October due to low levels. He should continue the current dosage.    4. Hyperlipidemia.  He is taking atorvastatin for cholesterol management. His LDL levels are within the normal range. He is due for a routine cholesterol check after February 2025.    5. Vitamin B12 deficiency.  He is taking B12 supplements and should continue with the current regimen.    6. Vitamin D deficiency.  He is taking vitamin D3 supplements and should continue with the current regimen.    7. Health Maintenance.  He received his flu shot. He is up to date with his eye doctor visits (at least once a year) and dental visits (at least twice a year). His colonoscopy is up to date and the next one is scheduled for 2026 due to previous polyps.    Follow-up  Return in 1 year for follow-up.          Services suggested: No services needed at this time  Health Care Screening: Age-appropriate preventive services recommended by USPTF and ACIP covered by Medicare were discussed today. Services ordered if indicated and agreed upon by the patient.  Referrals offered: Community-based lifestyle interventions to reduce health risks and promote self-management and wellness, fall prevention, nutrition, physical activity, tobacco-use cessation, weight loss, and mental health services as per orders if indicated.    Discussion today about general wellness and lifestyle habits:    Prevent falls and reduce trip hazards; Cautioned  about securing or removing rugs.  Have a working fire alarm and carbon monoxide detector;   Engage in regular physical activity and social activities     Follow-up: Return in about 1 year (around 12/6/2025).    I have placed the below orders and discussed them with an approved delegating provider.  The MA is performing the below orders under the direction of Dr. Marcin MALDONADO.

## 2025-02-04 DIAGNOSIS — E11.9 CONTROLLED TYPE 2 DIABETES MELLITUS WITHOUT COMPLICATION, WITHOUT LONG-TERM CURRENT USE OF INSULIN (HCC): ICD-10-CM

## 2025-02-10 ENCOUNTER — NON-PROVIDER VISIT (OUTPATIENT)
Dept: ENDOCRINOLOGY | Facility: MEDICAL CENTER | Age: 70
End: 2025-02-10
Attending: INTERNAL MEDICINE
Payer: MEDICARE

## 2025-02-10 VITALS
HEIGHT: 66 IN | OXYGEN SATURATION: 92 % | DIASTOLIC BLOOD PRESSURE: 72 MMHG | BODY MASS INDEX: 28.93 KG/M2 | WEIGHT: 180 LBS | HEART RATE: 74 BPM | SYSTOLIC BLOOD PRESSURE: 116 MMHG

## 2025-02-10 DIAGNOSIS — E78.5 DYSLIPIDEMIA: ICD-10-CM

## 2025-02-10 DIAGNOSIS — E55.9 VITAMIN D DEFICIENCY: ICD-10-CM

## 2025-02-10 DIAGNOSIS — E11.9 CONTROLLED TYPE 2 DIABETES MELLITUS WITHOUT COMPLICATION, WITHOUT LONG-TERM CURRENT USE OF INSULIN (HCC): ICD-10-CM

## 2025-02-10 DIAGNOSIS — E03.9 HYPOTHYROIDISM (ACQUIRED): ICD-10-CM

## 2025-02-10 LAB
HBA1C MFR BLD: 6.5 % (ref ?–5.8)
POCT INT CON NEG: NEGATIVE
POCT INT CON POS: POSITIVE

## 2025-02-10 PROCEDURE — 83036 HEMOGLOBIN GLYCOSYLATED A1C: CPT

## 2025-02-10 PROCEDURE — 99212 OFFICE O/P EST SF 10 MIN: CPT | Performed by: INTERNAL MEDICINE

## 2025-02-10 ASSESSMENT — FIBROSIS 4 INDEX: FIB4 SCORE: 0.71

## 2025-02-10 NOTE — PROGRESS NOTES
RN-CDE Note    Subjective:   Endocrinology Clinic Progress Note  PCP: LEON Hernandez    HPI:  Kristian Frank is a 69 y.o. old patient who is seen today by the Diabetes Nurse Specialist for review of Type 2 Diabetes.  Recent changes in health: Left back strain.  DM:   Last A1c:   Lab Results   Component Value Date/Time    HBA1C 6.4 (A) 09/10/2024 02:56 PM      HBA1C today is 6.5  A1C GOAL: < 7    Diabetes Medications:   Ozempic 1 mg weekly  Actos 30 mg daily  Jardiance 10 mg daily  Metformin  mg 2 BID         Exercise: Walking daily at least 2 miles daily  Diet: Bran cereal and muffin.  Skips lunch.  Dinner is protein, vegetables, and carbohydrates.  Not drinking enough.      Exercise and nutrition counseling were performed at this visit.    Glucose monitoring frequency: randomly  Low 100's  Hypoglycemic episodes: no     Last Retinal Exam:  Had an eye exam at [facility] in November  Daily Foot Exam: Yes   Foot Exam:  Monofilament: done  Monofilament testing with a 10 gram force: sensation intact: intact bilaterally  Visual Inspection: Feet without maceration, ulcers, fissures.  Pedal pulses: intact bilaterally   Lab Results   Component Value Date/Time    MICROALBCALC 5.9 10/16/2013 07:12 AM    MALBCRT see below 02/29/2024 07:47 AM    MICROALBUR <1.2 02/29/2024 07:47 AM    MICRALB 7.4 10/16/2013 07:12 AM        ACR Albumin/Creatinine Ratio goal <30     HTN:   Blood pressure goal <130/<80   Currently Rx ACE/ARB:  no    Dyslipidemia:    Lab Results   Component Value Date/Time    CHOLSTRLTOT 129 02/29/2024 07:48 AM    LDL 63 02/29/2024 07:48 AM    HDL 51 02/29/2024 07:48 AM    TRIGLYCERIDE 75 02/29/2024 07:48 AM         Currently Rx Statin:  yes    He  reports that he has been smoking cigarettes. He has a 14 pack-year smoking history. He has never used smokeless tobacco.    Plan:     Discussed and educated on:   - All medications, side effects and compliance  (discussed carefully)  - Annual eye examinations at Ophthalmology  - Home glucose monitoring emphasized  - Weight control and daily exercise    Recommended medication changes: No changes at this time.  He will continue Ozempic 1 mg weekly, Actos 30 mg daily, Jardiance 10 mg daily, and Metformin  mg 2 BID.  He will get his labs done and let us know, so we can review them.  He will follow up with Dr. Jones in 6 months.

## 2025-02-11 ENCOUNTER — HOSPITAL ENCOUNTER (OUTPATIENT)
Dept: LAB | Facility: MEDICAL CENTER | Age: 70
End: 2025-02-11
Attending: INTERNAL MEDICINE
Payer: MEDICARE

## 2025-02-11 DIAGNOSIS — E03.9 HYPOTHYROIDISM (ACQUIRED): ICD-10-CM

## 2025-02-11 DIAGNOSIS — E55.9 VITAMIN D DEFICIENCY: ICD-10-CM

## 2025-02-11 DIAGNOSIS — E78.5 DYSLIPIDEMIA: ICD-10-CM

## 2025-02-11 DIAGNOSIS — E11.9 CONTROLLED TYPE 2 DIABETES MELLITUS WITHOUT COMPLICATION, WITHOUT LONG-TERM CURRENT USE OF INSULIN (HCC): ICD-10-CM

## 2025-02-11 LAB
25(OH)D3 SERPL-MCNC: 52 NG/ML (ref 30–100)
ALBUMIN SERPL BCP-MCNC: 4.3 G/DL (ref 3.2–4.9)
ALBUMIN/GLOB SERPL: 1.5 G/DL
ALP SERPL-CCNC: 54 U/L (ref 30–99)
ALT SERPL-CCNC: 22 U/L (ref 2–50)
ANION GAP SERPL CALC-SCNC: 10 MMOL/L (ref 7–16)
AST SERPL-CCNC: 19 U/L (ref 12–45)
BILIRUB SERPL-MCNC: 0.5 MG/DL (ref 0.1–1.5)
BUN SERPL-MCNC: 17 MG/DL (ref 8–22)
CALCIUM ALBUM COR SERPL-MCNC: 9.3 MG/DL (ref 8.5–10.5)
CALCIUM SERPL-MCNC: 9.5 MG/DL (ref 8.5–10.5)
CHLORIDE SERPL-SCNC: 100 MMOL/L (ref 96–112)
CHOLEST SERPL-MCNC: 135 MG/DL (ref 100–199)
CO2 SERPL-SCNC: 25 MMOL/L (ref 20–33)
CREAT SERPL-MCNC: 1.08 MG/DL (ref 0.5–1.4)
FASTING STATUS PATIENT QL REPORTED: NORMAL
GFR SERPLBLD CREATININE-BSD FMLA CKD-EPI: 73 ML/MIN/1.73 M 2
GLOBULIN SER CALC-MCNC: 2.8 G/DL (ref 1.9–3.5)
GLUCOSE SERPL-MCNC: 135 MG/DL (ref 65–99)
HDLC SERPL-MCNC: 47 MG/DL
LDLC SERPL CALC-MCNC: 69 MG/DL
POTASSIUM SERPL-SCNC: 4.6 MMOL/L (ref 3.6–5.5)
PROT SERPL-MCNC: 7.1 G/DL (ref 6–8.2)
SODIUM SERPL-SCNC: 135 MMOL/L (ref 135–145)
T3FREE SERPL-MCNC: 2.54 PG/ML (ref 2–4.4)
T4 FREE SERPL-MCNC: 1.3 NG/DL (ref 0.93–1.7)
TRIGL SERPL-MCNC: 97 MG/DL (ref 0–149)
TSH SERPL-ACNC: 5.41 UIU/ML (ref 0.35–5.5)

## 2025-02-11 PROCEDURE — 84443 ASSAY THYROID STIM HORMONE: CPT

## 2025-02-11 PROCEDURE — 84481 FREE ASSAY (FT-3): CPT

## 2025-02-11 PROCEDURE — 82043 UR ALBUMIN QUANTITATIVE: CPT

## 2025-02-11 PROCEDURE — 36415 COLL VENOUS BLD VENIPUNCTURE: CPT

## 2025-02-11 PROCEDURE — 82570 ASSAY OF URINE CREATININE: CPT

## 2025-02-11 PROCEDURE — 84439 ASSAY OF FREE THYROXINE: CPT

## 2025-02-11 PROCEDURE — 80053 COMPREHEN METABOLIC PANEL: CPT

## 2025-02-11 PROCEDURE — 82306 VITAMIN D 25 HYDROXY: CPT

## 2025-02-11 PROCEDURE — 80061 LIPID PANEL: CPT

## 2025-02-12 LAB
CREAT UR-MCNC: 116 MG/DL
MICROALBUMIN UR-MCNC: 1.6 MG/DL
MICROALBUMIN/CREAT UR: 14 MG/G (ref 0–30)

## 2025-02-20 NOTE — ANESTHESIA PREPROCEDURE EVALUATION
Case: [id number] Date/Time: 11/07/23 0645    Procedure: RIGHT TOTAL SHOULDER ARTHROPLASTY AND REPAIRS AS INDICATED    Pre-op diagnosis: OTHER SPECIFIED JOINT DISORDERS, RIGHT SHOULDER    Location: [facility] OR 02 / SURGERY [facility]    Surgeons: Montserrat Varghese M.D.          Relevant Problems   ANESTHESIA   (positive) Sleep apnea      PULMONARY (within normal limits)      NEURO (within normal limits)      CARDIAC   (positive) Atherosclerosis of both carotid arteries      GI (within normal limits)       (within normal limits)      ENDO   (positive) Dyslipidemia associated with type 2 diabetes mellitus (HCC)   (positive) Hypothyroidism (acquired)      Other   (positive) Cigarette nicotine dependence without complication   (positive) Left cervical radiculopathy       Physical Exam    Airway   Mallampati: II  TM distance: >3 FB  Neck ROM: full       Cardiovascular - normal exam  Rhythm: regular  Rate: normal  (-) murmur     Dental - normal exam           Pulmonary - normal exam  Breath sounds clear to auscultation     Abdominal    Neurological - normal exam                 Anesthesia Plan    ASA 2       Plan - general and peripheral nerve block     Peripheral nerve block will be post-op pain control  Airway plan will be LMA          Induction: intravenous    Postoperative Plan: Postoperative administration of opioids is intended.    Pertinent diagnostic labs and testing reviewed    Informed Consent:    Anesthetic plan and risks discussed with patient.    Use of blood products discussed with: patient whom consented to blood products.          Quality 226: Preventive Care And Screening: Tobacco Use: Screening And Cessation Intervention: Patient screened for tobacco use and is an ex/non-smoker Quality 130: Documentation Of Current Medications In The Medical Record: Current Medications Documented Detail Level: Detailed

## 2025-03-03 DIAGNOSIS — E11.41 TYPE 2 DIABETES MELLITUS WITH DIABETIC MONONEUROPATHY, WITHOUT LONG-TERM CURRENT USE OF INSULIN (HCC): ICD-10-CM

## 2025-03-04 RX ORDER — SEMAGLUTIDE 1.34 MG/ML
1 INJECTION, SOLUTION SUBCUTANEOUS
Qty: 3 ML | Refills: 11 | Status: SHIPPED | OUTPATIENT
Start: 2025-03-04

## 2025-03-11 DIAGNOSIS — E78.5 DYSLIPIDEMIA: ICD-10-CM

## 2025-03-11 RX ORDER — ATORVASTATIN CALCIUM 40 MG/1
40 TABLET, FILM COATED ORAL DAILY
Qty: 100 TABLET | Refills: 2 | Status: SHIPPED | OUTPATIENT
Start: 2025-03-11

## 2025-07-02 DIAGNOSIS — E03.9 HYPOTHYROIDISM (ACQUIRED): ICD-10-CM

## 2025-07-02 RX ORDER — LEVOTHYROXINE SODIUM 100 UG/1
100 TABLET ORAL
Qty: 90 TABLET | Refills: 2 | Status: SHIPPED | OUTPATIENT
Start: 2025-07-02

## 2025-08-28 ENCOUNTER — OFFICE VISIT (OUTPATIENT)
Dept: MEDICAL GROUP | Facility: MEDICAL CENTER | Age: 70
End: 2025-08-28
Payer: MEDICARE

## 2025-08-28 VITALS
SYSTOLIC BLOOD PRESSURE: 122 MMHG | HEART RATE: 77 BPM | WEIGHT: 180 LBS | RESPIRATION RATE: 14 BRPM | BODY MASS INDEX: 28.93 KG/M2 | OXYGEN SATURATION: 97 % | DIASTOLIC BLOOD PRESSURE: 74 MMHG | HEIGHT: 66 IN | TEMPERATURE: 98.6 F

## 2025-08-28 DIAGNOSIS — E11.69 DYSLIPIDEMIA ASSOCIATED WITH TYPE 2 DIABETES MELLITUS (HCC): ICD-10-CM

## 2025-08-28 DIAGNOSIS — E11.41 DIABETIC MONONEUROPATHY ASSOCIATED WITH TYPE 2 DIABETES MELLITUS (HCC): ICD-10-CM

## 2025-08-28 DIAGNOSIS — F10.20 UNCOMPLICATED ALCOHOL DEPENDENCE (HCC): ICD-10-CM

## 2025-08-28 DIAGNOSIS — E11.41 TYPE 2 DIABETES MELLITUS WITH DIABETIC MONONEUROPATHY, WITHOUT LONG-TERM CURRENT USE OF INSULIN (HCC): Primary | ICD-10-CM

## 2025-08-28 DIAGNOSIS — E03.9 HYPOTHYROIDISM (ACQUIRED): ICD-10-CM

## 2025-08-28 DIAGNOSIS — F17.210 CIGARETTE NICOTINE DEPENDENCE WITHOUT COMPLICATION: ICD-10-CM

## 2025-08-28 DIAGNOSIS — R20.0 NUMBNESS OF RIGHT HAND: ICD-10-CM

## 2025-08-28 DIAGNOSIS — E78.5 DYSLIPIDEMIA ASSOCIATED WITH TYPE 2 DIABETES MELLITUS (HCC): ICD-10-CM

## 2025-08-28 PROBLEM — K57.30 DIVERTICULOSIS OF LARGE INTESTINE WITHOUT PERFORATION OR ABSCESS WITHOUT BLEEDING: Status: ACTIVE | Noted: 2023-08-29

## 2025-08-28 LAB
HBA1C MFR BLD: 6.3 % (ref ?–5.8)
POCT INT CON NEG: NEGATIVE
POCT INT CON POS: POSITIVE

## 2025-08-28 PROCEDURE — 92250 FUNDUS PHOTOGRAPHY W/I&R: CPT | Mod: TC | Performed by: NURSE PRACTITIONER

## 2025-08-28 PROCEDURE — 3074F SYST BP LT 130 MM HG: CPT | Performed by: NURSE PRACTITIONER

## 2025-08-28 PROCEDURE — 99214 OFFICE O/P EST MOD 30 MIN: CPT | Performed by: NURSE PRACTITIONER

## 2025-08-28 PROCEDURE — 83036 HEMOGLOBIN GLYCOSYLATED A1C: CPT | Performed by: NURSE PRACTITIONER

## 2025-08-28 PROCEDURE — 3078F DIAST BP <80 MM HG: CPT | Performed by: NURSE PRACTITIONER

## 2025-08-28 ASSESSMENT — PATIENT HEALTH QUESTIONNAIRE - PHQ9: CLINICAL INTERPRETATION OF PHQ2 SCORE: 0

## 2025-08-28 ASSESSMENT — FIBROSIS 4 INDEX: FIB4 SCORE: 1.02

## 2025-08-29 LAB — RETINAL SCREEN: NEGATIVE

## (undated) DEVICE — SUTURE 3-0 MONOCRYL PLUS PS-2 - (12/BX)

## (undated) DEVICE — SODIUM CHL. IRRIGATION 0.9% 3000ML (4EA/CA 65CA/PF)

## (undated) DEVICE — DRAPE LARGE 3 QUARTER - (20/CA)

## (undated) DEVICE — PACK TOTAL HIP - (1/CA)

## (undated) DEVICE — STERI STRIP COMPOUND BENZOIN - TINCTURE 0.6ML WITH APPLICATOR (40EA/BX)

## (undated) DEVICE — DRAPE SURG STERI-DRAPE 7X11OD - (40EA/CA)

## (undated) DEVICE — SPIDER SHOULDER HOLDER (12EA/BX)

## (undated) DEVICE — NEEDLE MAYO CATGUT TPR POINT - (2EA/PK20PK/BX)

## (undated) DEVICE — TUBE CONNECTING SUCTION - CLEAR PLASTIC STERILE 72 IN (50EA/CA)

## (undated) DEVICE — COVER LIGHT HANDLE FLEXIBLE - SOFT (2EA/PK 80PK/CA)

## (undated) DEVICE — DRAPETIBURON SHOULDER W/POUCH - (5EA/CA)

## (undated) DEVICE — STOCKINETTE, TUBULAR 6

## (undated) DEVICE — GLOVE SURGICAL PROTEXIS PI 8.0 LF - (50PR/BX)

## (undated) DEVICE — CANISTER SUCTION RIGID RED 1500CC (40EA/CA)

## (undated) DEVICE — SODIUM CHL IRRIGATION 0.9% 1000ML (12EA/CA)

## (undated) DEVICE — SUTURE GENERAL

## (undated) DEVICE — CLOSURE SKIN STRIP 1/2 X 4 IN - (STERI STRIP) (50/BX 4BX/CA)

## (undated) DEVICE — TIP INTPLS HFLO ML ORFC BTRY - (12/CS)  FOR SURGILAV

## (undated) DEVICE — SUTURE 2-0 MONOCRYL PLUS UNDYED CT-1 1 X 36 (36EA/BX)"

## (undated) DEVICE — SUCTION INSTRUMENT YANKAUER BULBOUS TIP W/O VENT (50EA/CA)

## (undated) DEVICE — Device

## (undated) DEVICE — WATER IRRIGATION STERILE 1000ML (12EA/CA)

## (undated) DEVICE — NEEDLE W/FACET TIP DULL VERSION W/STIMULATION CABLE SONOPLEX 21G X 4 (10/EA)"

## (undated) DEVICE — DEVICE MONOPOLAR RF PEAK PLASMABLADE 3.0S

## (undated) DEVICE — TOWEL STOP TIMEOUT SAFETY FLAG (40EA/CA)

## (undated) DEVICE — SUTURE 0 VICRYL PLUS CT-1 - 8 X 18 INCH (12/BX)

## (undated) DEVICE — DRAPE U ORTHOPEDIC - (10/BX)

## (undated) DEVICE — HUMID-VENT HEAT AND MOISTURE EXCHANGE- (50/BX)

## (undated) DEVICE — GLOVE 7.0 LF PF PROTEXIS (50PR/BX)

## (undated) DEVICE — SOLUTION PREP PVP IODINE 3/4 OZ POUCH PACKET CONTAINER STERILE LATEX FREE

## (undated) DEVICE — GLOVE BIOGEL SZ 7 SURGICAL PF LTX - (50PR/BX 4BX/CA)

## (undated) DEVICE — SENSOR OXIMETER ADULT SPO2 RD SET (20EA/BX)

## (undated) DEVICE — ELECTRODE DUAL RETURN W/ CORD - (50/PK)

## (undated) DEVICE — GLOVE BIOGEL INDICATOR SZ 8 SURGICAL PF LTX - (50/BX 4BX/CA)

## (undated) DEVICE — TUBE E-T HI-LO CUFF 7.0MM (10EA/PK)

## (undated) DEVICE — HANDPIECE 10FT INTPLS SCT PLS IRRIGATION HAND CONTROL SET (6/PK)

## (undated) DEVICE — SUTURE NONABSORBABLE XBRAID #2 26MM (12EA/BX)

## (undated) DEVICE — SYRINGE ASEPTO - (50EA/CA

## (undated) DEVICE — BLADE SAGITTAL SAW DUAL CUT 75.0 X 25.0MM (1/EA)

## (undated) DEVICE — GOWN WARMING STANDARD FLEX - (30/CA)

## (undated) DEVICE — BAG SPONGE COUNT 10.25 X 32 - BLUE (250/CA)

## (undated) DEVICE — AQUACEL 4X4